# Patient Record
Sex: FEMALE | Race: WHITE | NOT HISPANIC OR LATINO | Employment: FULL TIME | URBAN - METROPOLITAN AREA
[De-identification: names, ages, dates, MRNs, and addresses within clinical notes are randomized per-mention and may not be internally consistent; named-entity substitution may affect disease eponyms.]

---

## 2017-04-17 ENCOUNTER — PATIENT OUTREACH (OUTPATIENT)
Dept: ADMINISTRATIVE | Facility: HOSPITAL | Age: 52
End: 2017-04-17

## 2017-04-17 NOTE — LETTER
April 17, 2017    Funmi Leee  [address]  [place] [address]             [facility]  [address]  [place] [address]  Phone: [phone] Dear Ms. Houston:    [facility] is committed to your overall health.  Currently, we have  Dr. Burks listed as your primary care provider.      If Dr. Burks is no longer your primary care provider, please contact our office at [phone] so that we may update our records accordingly.     You may also be due for the following test and/or procedures:    One-time Hepatitis C Screening lab test(a viral condition that can harm the liver)  Tetanus immunization  colonoscopy    If you have had any of the above done at another facility, please bring the records or information to your appointment so that your record at [facility] will be complete.    Please schedule an appointment with Dr. Burks at your earliest convenience by calling [phone] or going to [email or web address].    We appreciate the opportunity to provide you with excellent medical care.    Sincerely,  Manju Mirza  Clinical Care Coordinator  [facility] [address] [facility] [address]  [place] [address]  Phone: [phone]   Fax: [phone]

## 2017-05-16 ENCOUNTER — OFFICE VISIT (OUTPATIENT)
Dept: PRIMARY CARE CLINIC | Facility: CLINIC | Age: 52
End: 2017-05-16
Payer: COMMERCIAL

## 2017-05-16 VITALS
RESPIRATION RATE: 16 BRPM | WEIGHT: 244.06 LBS | OXYGEN SATURATION: 97 % | TEMPERATURE: 98 F | DIASTOLIC BLOOD PRESSURE: 78 MMHG | BODY MASS INDEX: 36.99 KG/M2 | HEIGHT: 68 IN | HEART RATE: 73 BPM | SYSTOLIC BLOOD PRESSURE: 134 MMHG

## 2017-05-16 DIAGNOSIS — B00.1 RECURRENT COLD SORES: ICD-10-CM

## 2017-05-16 DIAGNOSIS — R59.1 LYMPHADENOPATHY OF HEAD AND NECK: Primary | ICD-10-CM

## 2017-05-16 PROCEDURE — 99999 PR PBB SHADOW E&M-EST. PATIENT-LVL IV: CPT | Mod: PBBFAC,,, | Performed by: NURSE PRACTITIONER

## 2017-05-16 PROCEDURE — 1160F RVW MEDS BY RX/DR IN RCRD: CPT | Mod: S$GLB,,, | Performed by: NURSE PRACTITIONER

## 2017-05-16 PROCEDURE — 99213 OFFICE O/P EST LOW 20 MIN: CPT | Mod: S$GLB,,, | Performed by: NURSE PRACTITIONER

## 2017-05-16 RX ORDER — VALACYCLOVIR HYDROCHLORIDE 1 G/1
TABLET, FILM COATED ORAL
Qty: 30 TABLET | Refills: 1 | Status: SHIPPED | OUTPATIENT
Start: 2017-05-16 | End: 2017-09-21

## 2017-05-16 RX ORDER — AZITHROMYCIN 250 MG/1
TABLET, FILM COATED ORAL
Refills: 0 | COMMUNITY
Start: 2017-03-05 | End: 2017-05-16

## 2017-05-16 RX ORDER — DARIFENACIN 15 MG/1
TABLET, EXTENDED RELEASE ORAL
Refills: 11 | COMMUNITY
Start: 2017-04-29 | End: 2017-09-21

## 2017-05-16 RX ORDER — BENZONATATE 100 MG/1
CAPSULE ORAL
Refills: 0 | COMMUNITY
Start: 2017-03-05 | End: 2017-05-16

## 2017-05-16 RX ORDER — AZITHROMYCIN 250 MG/1
TABLET, FILM COATED ORAL
Qty: 6 TABLET | Refills: 0 | Status: SHIPPED | OUTPATIENT
Start: 2017-05-16 | End: 2017-06-22

## 2017-05-16 NOTE — MR AVS SNAPSHOT
[facility]  [address] [facility] [address]  [facility] [address]  Phone: [phone]                  Funmi Houston   2017 6:30 PM   Office Visit    Description:  Female : 1965   Provider:  Adeola Lamb NP   Department:  [facility]           Reason for Visit     Edema     Sore Throat     Otalgia     Medication Refill           Diagnoses this Visit        Comments    Lymphadenopathy of head and neck    -  Primary     Recurrent cold sores                To Do List           Goals (5 Years of Data)     None       These Medications        Disp Refills Start End    azithromycin (Z-NASIMA) 250 MG tablet 6 tablet 0 2017     Take 2 tablets by mouth on day 1; Take 1 tablet by mouth on days 2-5    [id number] - [facility] [address] Ph #: [phone]       valacyclovir (VALTREX) 1000 MG tablet 30 tablet 1 2017     TAKE 2 TABLETS BY MOUTH TWICE A DAY for one day. As needed for fever blister.    [id number] - [place] [address] Ph #: [phone]         [facility]     [facility] On [facility] Nurse Care Line -  Assistance  Unless otherwise directed by your provider, please contact [facility], our nurse care line that is available for  assistance.     Registered nurses in the [facility] provide: appointment scheduling, clinical advisement, health education, and other advisory services.  Call: [phone] (toll free)               Medications           Message regarding Medications     Verify the changes and/or additions to your medication regime listed below are the same as discussed with your clinician today.  If any of these changes or additions are incorrect, please notify your healthcare provider.        START taking these NEW medications        Refills    azithromycin (Z-NASIMA) 250 MG tablet 0    Sig: Take 2 tablets by mouth on day 1; Take 1 tablet by mouth on days 2-5    Class:  Normal      CHANGE how you are taking these medications     Start Taking Instead of    valacyclovir (VALTREX) 1000 MG tablet valacyclovir (VALTREX) 1000 MG tablet    Dosage:  TAKE 2 TABLETS BY MOUTH TWICE A DAY for one day. As needed for fever blister. Dosage:  TAKE 2 TABLETS BY MOUTH TWICE A DAY for one day    Reason for Change:  Patient no longer taking       STOP taking these medications     benzonatate (TESSALON) 100 MG capsule SWALLOW WHOLE 1 CAPSULE BY MOUTH 3 TIMES A DAY AS NEEDED. DO NOT BREAK, CHEW, DISSOLVE, CUT OR CRUSH    fluticasone (FLONASE) 50 mcg/actuation nasal spray 2 sprays by Each Nare route once daily.    furosemide (LASIX) 20 MG tablet Take 1 tablet (20 mg total) by mouth once daily.    methylPREDNISolone (MEDROL DOSEPACK) 4 mg tablet Take as directed           Verify that the below list of medications is an accurate representation of the medications you are currently taking.  If none reported, the list may be blank. If incorrect, please contact your healthcare provider. Carry this list with you in case of emergency.           Current Medications     darifenacin (ENABLEX) 15 mg 24 hr tablet TAKE 1 TABLET(S) BY MOUTH DAILY    estradiol (ESTRACE) 0.5 MG tablet Take 0.5 mg by mouth once daily.      multivitamin (ONE DAILY MULTIVITAMIN) per tablet Take 1 tablet by mouth once daily.    omeprazole (PRILOSEC) 40 MG capsule TAKE 1 CAPSULE (40 MG TOTAL) BY MOUTH ONCE DAILY.    ranitidine (ZANTAC) 300 MG tablet TAKE 1 TABLET (300 MG TOTAL) BY MOUTH EVERY EVENING. , ABOUT 30-60 MINUTES BEFORE BEDTIME.    azithromycin (Z-NASIMA) 250 MG tablet Take 2 tablets by mouth on day 1; Take 1 tablet by mouth on days 2-5    potassium chloride (KAYCIEL) 10 % solution Take 5 mLs by mouth daily as needed.     valacyclovir (VALTREX) 1000 MG tablet TAKE 2 TABLETS BY MOUTH TWICE A DAY for one day. As needed for fever blister.           Clinical Reference Information           Your Vitals Were     BP Pulse Temp Resp Height  "Weight    134/78 73 98.4 °F (36.9 °C) 16 5' 8" (1.727 m) 110.7 kg (244 lb 0.8 oz)    SpO2 BMI             97% 37.11 kg/m2         Blood Pressure          Most Recent Value    BP  134/78      Allergies as of 5/16/2017     Penicillins      Immunizations Administered on Date of Encounter - 5/16/2017     None      [facility] Sign-Up     Activating your [facility] account is as easy as 1-2-3!     1) Visit [email or web address], select Sign Up Now, enter this activation code and your date of birth, then select Next.  [email or web address]  Expires: 6/30/2017  6:58 PM      2) Create a username and password to use when you visit [facility] in the future and select a security question in case you lose your password and select Next.    3) Enter your e-mail address and click Sign Up!    Additional Information  If you have questions, please e-mail [email or web address] or call [phone] to talk to our [facility] staff. Remember, [facility] is NOT to be used for urgent needs. For medical emergencies, dial 911.         Instructions    The lymph nodes are not indicating and ear infection or throat infection.  Their function is to increase in size with infection or inflammation.  We'll do a round of antibiotics.  If this doesn't improve the symptoms, call    Start the fever blister medicine right away.    If you have any questions, please call.  You can reach us at [phone] Monday through Friday (except holidays) 12 nooon to 6 p.m.    Thank you for using the [facility]!    Marsha Lamb, APRN, CNP, FNP-BC  [facility]  [facility]      Lymphadenopathy  Lymphadenopathy is swelling of the lymph nodes. Lymph nodes are small, bean-shaped glands around the body.  What are lymph nodes?  Lymph nodes are part of your immune system. The glands are found in your neck, armpits, groin, chest, and abdomen. They act as filters for lymph fluid as it flows through your body. Lymph fluid contains white blood cells and other things that " fight infection.  Why lymph nodes swell  Lymphadenopathy is very common. The glands often enlarge during a viral or bacterial infection. It can happen during a cold, the flu, or strep throat. The nodes may swell in just one area of the body, such as the neck (localized). Or nodes may swell all over the body (generalized). The neck (cervical) lymph nodes are the most common site of lymphadenopathy.  What causes lymphadenopathy?  Dead cells and fluid build up in the lymph nodes as they help fight infection or disease. This causes them to swell in size. Enlarged lymph nodes are often near the source of infection. This can help to find the cause of an infection. For example, swollen lymph nodes around the jaw may be because of an infection in the teeth or mouth. But lymphadenopathy may also be generalized. This is common in some viral illnesses such as mononucleosis or chickenpox (varicella).  Lymphadenopathy can also be caused by:  · Infection of a lymph node or small group of nodes (lymphadenitis)  · Cancer  · Reactions to medicines such as antibiotics and some seizure medicines  · Other health conditions, such as lupus  Symptoms of lymphadenopathy  Lymphadenopathy can cause symptoms such as:  · Lumps under the jaw, on the sides or back of the neck, in the armpits, in the groin, or in the chest or belly (abdomen)  · Pain or tenderness in any of these areas  · Redness or warmth in any of these areas  You may also have symptoms from an infection causing the swollen glands. These symptoms may include fever, sore throat, body aches, or cough.  Diagnosing lymphadenopathy  Your health care provider will ask about your health history and symptoms. He or she will give you a physical exam and check the areas where lymph nodes are enlarged. Your health care provider will check the size and location of the nodes, and ask how long they have been swollen and if they are painful. Diagnostic tests and referral to specialists may be  recommended. They may include:  · Blood tests. These are done to check for signs of infection and other problems.  · Urine test. This is also done to check for infection and other problems.  · Chest X-ray. This test can show enlarged lymph nodes or other problems.  · Lymph node biopsy. If lymph nodes are swollen for 3 to 4 weeks, they may be checked with a biopsy. Small samples of lymph node tissue are taken and checked in a lab for signs of cancer. You may be referred to a specialist in blood disorders and cancer (hematologist and oncologist).  Treatment for lymphadenopathy  The treatment of enlarged lymph nodes depends on the cause. Enlarged lymph nodes are often harmless and go away without any treatment. Treatment is most often done on the cause of the enlarged nodes and may include:  · Antibiotic medicine to treat a bacterial infection  · Incision and drainage (I & D) of a lymph node for lymphadenitis  · Other medicines or procedures to treat the cause of the enlarged nodes  You may need follow-up exam in 3 to 4 weeks to recheck enlarged nodes.     When to call your health care provider  Call your health care provider if you have lymph nodes that are still swollen after 3 to 4 weeks.   Date Last Reviewed: 6/19/2015  © 9586-6254 [employer]. [address] [place] [address]. All rights reserved. This information is not intended as a substitute for professional medical care. Always follow your healthcare professional's instructions.             Language Assistance Services     ATTENTION: Language assistance services are available, free of charge. Please call [phone].      ATENCIÓN: Si habla juan mañol, tiene a odonnell disposición servicios gratuitos de asistencia lingüística. Llbridgett al [phone].     [facility] Ý: N?u b?n nói Ti?ng Vi?t, có các d?ch v? h? tr? ngôn ng? mi?n phí dành cho b?n. G?i s? [phone].         [facility] complies with applicable Federal civil rights  laws and does not discriminate on the basis of race, color, national origin, age, disability, or sex.

## 2017-05-16 NOTE — PROGRESS NOTES
"Funmi Houston is a 51 y.o. female patient of Abelardo Burks MD who presents to the clinic today for   Chief Complaint   Patient presents with    Edema     left side of neck glands are swollen, noticed 2 days ago, took advil sinus pill today, is on a daily alegra    Sore Throat    Otalgia     started today    Medication Refill     Valtrex for fever blisters.   .    HPI    Pt, who is not known to me, reports a new problem to me:  Left side of the neck glands swollen, throat "tight" r/t swelling, no ST inside.  No RN, unusual coughing, fever.  Glands swollen x 2 days, ear pain today.  Right eye "a little swollen".      These symptoms began 2 days ago and status is worsening.     Symptoms are + acute    Pt denies the following symptoms:  Fever, RN, abnormal coughing (has a little cough much of time)    Aggravating factors include moving her neck, smiling.    Relieving factors include nothing .    OTC Medications tried are Tylenol, Advil.    Prescription medications taken for symptoms are none.    Pertinent medical history:  No sick contacts.  H/o swollen glands long time ago, occurred at that time with the fever blister.  Feels fever blisters coming now.    Smoking status:  never    ROS    Constitutional:   No  fever, no fatigue, no change in appetite.    Head:   + headache  Ears:   left pain.  Eyes: right eye swollen  Nose:   No sinus pain, no congestion, no runny nose, no post nasal drip.  Throat:  No ST pain.    Heart:  No palpitations, chest pain.    Lungs:  No difficulty breathing, no unusual coughing    GI/:  No sxs    MS:  No new bone, joint or muscle problems.    Skin:  No rashes, itching.      PAST MEDICAL HISTORY:  Past Medical History:   Diagnosis Date    Arthritis of knee     Edema     History of endometriosis     Restless leg syndrome        PAST SURGICAL HISTORY:  Past Surgical History:   Procedure Laterality Date    CHOLECYSTECTOMY      TONSILLECTOMY      TOTAL VAGINAL HYSTERECTOMY   "       SOCIAL HISTORY:  Social History     Social History    Marital status:      Spouse name: N/A    Number of children: N/A    Years of education: N/A     Occupational History    Not on file.     Social History Main Topics    Smoking status: Never Smoker    Smokeless tobacco: Never Used    Alcohol use Yes      Comment: seldom    Drug use: No    Sexual activity: Not on file     Other Topics Concern    Not on file     Social History Narrative        2 children           FAMILY HISTORY:  Family History   Problem Relation Age of Onset    Diabetes Mother     Kidney disease Mother     Stroke Mother     Heart disease Mother     Diabetes Father     Stroke Father     Heart disease Father     Macular degeneration Father     Glaucoma Father     Diabetes Brother     Heart disease Brother     Macular degeneration Brother     Macular degeneration Sister     Macular degeneration Brother     Macular degeneration Other        ALLERGIES AND MEDICATIONS: updated and reviewed.  Review of patient's allergies indicates:   Allergen Reactions    Penicillins Hives and Rash     Current Outpatient Prescriptions   Medication Sig Dispense Refill    darifenacin (ENABLEX) 15 mg 24 hr tablet TAKE 1 TABLET(S) BY MOUTH DAILY  11    estradiol (ESTRACE) 0.5 MG tablet Take 0.5 mg by mouth once daily.        multivitamin (ONE DAILY MULTIVITAMIN) per tablet Take 1 tablet by mouth once daily.      omeprazole (PRILOSEC) 40 MG capsule TAKE 1 CAPSULE (40 MG TOTAL) BY MOUTH ONCE DAILY. 30 capsule 11    ranitidine (ZANTAC) 300 MG tablet TAKE 1 TABLET (300 MG TOTAL) BY MOUTH EVERY EVENING. , ABOUT 30-60 MINUTES BEFORE BEDTIME. 30 tablet 11    potassium chloride (KAYCIEL) 10 % solution Take 5 mLs by mouth daily as needed.        No current facility-administered medications for this visit.              PHYSICAL EXAM    Alert, coop 51 y.o. female patient in mod distress r/t pain.    Vitals:    05/16/17  1832   BP: 134/78   Pulse: 73   Resp: 16   Temp: 98.4 °F (36.9 °C)     VS reviewed.  VS stable.  CC, nursing note, medications & PMH all reviewed today.    Head:  Normocephalic, atraumatic.    EENT:  EACs patent.  TMs no erythema, diffuse LR, no effusions, no TM perforation.                              Eye lids:  Mild edema of the left upper and lower lid, no lesions or discharge present.  Right conjunctiva mildly injected.     Sinus tenderness to palp--none.               Nares--no edema, no d/c present.    Pharynx not injected.                Tonsils not erythematous , not enlarged, no exudate present.    Large, tender left anterior, no posterior cervical lymph nodes palpable.    Large tender left submandibular but no submental or supraclavicular lymph nodes palp.             Resp:  Respirations even, unlabored   Lungs CTA bilat.  No wheezing.  No crackles.  Moves air to bases bilat.    Heart:  RRR, no MRG.    MS:  Ambulates normally.             NEURO:  Alert and oriented x 4.  Responds appropriately during interaction.    Skin:  Warm, dry, color good.  No vesicular lesions noted.    Lymphadenopathy of head and neck  -     azithromycin (Z-NASIMA) 250 MG tablet; Take 2 tablets by mouth on day 1; Take 1 tablet by mouth on days 2-5  Dispense: 6 tablet; Refill: 0    Recurrent cold sores  -     valacyclovir (VALTREX) 1000 MG tablet; TAKE 2 TABLETS BY MOUTH TWICE A DAY for one day. As needed for fever blister.  Dispense: 30 tablet; Refill: 1      Pt today presents with 2 days of worsening lymphadenopathy w/o ST or sinus sxs, no fever.  C/o feeling the itching and prodrome of fever blisters starting on her upper lip but no lesions present as yet.  Requests RF of Valtrex.    This is a new problem to me.  No work up is planned.        Trial of antibiotic for the lymphadenopathy.  If it does not respond, pt to call.  At that time CBC would be ordered.  RF of Valtrex to start right away for the impending fever blisters.  Pt  advised to perform comfort measures recommended on patient instruction sheet .    If not better in 3 days, the patient is advised to call us.  She is advised that the glands may take even as long as 2 weeks resolve her sxs.  If worse or concerns, the patient is advised to call us.  Explained exam findings, diagnosis and treatment plan to patient.  Questions answered and patient states understanding.

## 2017-05-16 NOTE — PATIENT INSTRUCTIONS
The lymph nodes are not indicating and ear infection or throat infection.  Their function is to increase in size with infection or inflammation.  We'll do a round of antibiotics.  If this doesn't improve the symptoms, call    Start the fever blister medicine right away.    If you have any questions, please call.  You can reach us at [phone] Monday through Friday (except holidays) 12 nooon to 6 p.m.    Thank you for using the [facility]!    Marsha Lamb, APRN, CNP, FNP-BC  [facility]  [facility]      Lymphadenopathy  Lymphadenopathy is swelling of the lymph nodes. Lymph nodes are small, bean-shaped glands around the body.  What are lymph nodes?  Lymph nodes are part of your immune system. The glands are found in your neck, armpits, groin, chest, and abdomen. They act as filters for lymph fluid as it flows through your body. Lymph fluid contains white blood cells and other things that fight infection.  Why lymph nodes swell  Lymphadenopathy is very common. The glands often enlarge during a viral or bacterial infection. It can happen during a cold, the flu, or strep throat. The nodes may swell in just one area of the body, such as the neck (localized). Or nodes may swell all over the body (generalized). The neck (cervical) lymph nodes are the most common site of lymphadenopathy.  What causes lymphadenopathy?  Dead cells and fluid build up in the lymph nodes as they help fight infection or disease. This causes them to swell in size. Enlarged lymph nodes are often near the source of infection. This can help to find the cause of an infection. For example, swollen lymph nodes around the jaw may be because of an infection in the teeth or mouth. But lymphadenopathy may also be generalized. This is common in some viral illnesses such as mononucleosis or chickenpox (varicella).  Lymphadenopathy can also be caused by:  · Infection of a lymph node or small group of nodes  (lymphadenitis)  · Cancer  · Reactions to medicines such as antibiotics and some seizure medicines  · Other health conditions, such as lupus  Symptoms of lymphadenopathy  Lymphadenopathy can cause symptoms such as:  · Lumps under the jaw, on the sides or back of the neck, in the armpits, in the groin, or in the chest or belly (abdomen)  · Pain or tenderness in any of these areas  · Redness or warmth in any of these areas  You may also have symptoms from an infection causing the swollen glands. These symptoms may include fever, sore throat, body aches, or cough.  Diagnosing lymphadenopathy  Your health care provider will ask about your health history and symptoms. He or she will give you a physical exam and check the areas where lymph nodes are enlarged. Your health care provider will check the size and location of the nodes, and ask how long they have been swollen and if they are painful. Diagnostic tests and referral to specialists may be recommended. They may include:  · Blood tests. These are done to check for signs of infection and other problems.  · Urine test. This is also done to check for infection and other problems.  · Chest X-ray. This test can show enlarged lymph nodes or other problems.  · Lymph node biopsy. If lymph nodes are swollen for 3 to 4 weeks, they may be checked with a biopsy. Small samples of lymph node tissue are taken and checked in a lab for signs of cancer. You may be referred to a specialist in blood disorders and cancer (hematologist and oncologist).  Treatment for lymphadenopathy  The treatment of enlarged lymph nodes depends on the cause. Enlarged lymph nodes are often harmless and go away without any treatment. Treatment is most often done on the cause of the enlarged nodes and may include:  · Antibiotic medicine to treat a bacterial infection  · Incision and drainage (I & D) of a lymph node for lymphadenitis  · Other medicines or procedures to treat the cause of the enlarged  nodes  You may need follow-up exam in 3 to 4 weeks to recheck enlarged nodes.     When to call your health care provider  Call your health care provider if you have lymph nodes that are still swollen after 3 to 4 weeks.   Date Last Reviewed: 6/19/2015  © 5310-9657 [employer]. [address] [place] [address], [place] [address]. All rights reserved. This information is not intended as a substitute for professional medical care. Always follow your healthcare professional's instructions.

## 2017-05-16 NOTE — Clinical Note
Abelardo Burks MD,  I saw your patient today in the [facility].  If you have any questions, please do not hesitate to contact me.  Thank you!  KIKI Varner

## 2017-05-25 ENCOUNTER — TELEPHONE (OUTPATIENT)
Dept: UROLOGY | Facility: CLINIC | Age: 52
End: 2017-05-25

## 2017-05-25 NOTE — TELEPHONE ENCOUNTER
----- Message from Laurie Lemon sent at 5/25/2017  4:15 PM CDT -----  Contact: Patient  Patient is returning your call. Please call patient at [phone].

## 2017-06-13 RX ORDER — OMEPRAZOLE 40 MG/1
CAPSULE, DELAYED RELEASE ORAL
Qty: 30 CAPSULE | Refills: 11 | Status: SHIPPED | OUTPATIENT
Start: 2017-06-13 | End: 2018-06-14 | Stop reason: SDUPTHER

## 2017-06-22 ENCOUNTER — OFFICE VISIT (OUTPATIENT)
Dept: UROLOGY | Facility: CLINIC | Age: 52
End: 2017-06-22
Payer: COMMERCIAL

## 2017-06-22 VITALS
DIASTOLIC BLOOD PRESSURE: 72 MMHG | HEART RATE: 69 BPM | SYSTOLIC BLOOD PRESSURE: 139 MMHG | WEIGHT: 242.31 LBS | BODY MASS INDEX: 36.72 KG/M2 | HEIGHT: 68 IN

## 2017-06-22 DIAGNOSIS — R35.1 NOCTURIA MORE THAN TWICE PER NIGHT: ICD-10-CM

## 2017-06-22 DIAGNOSIS — R35.0 INCREASED URINARY FREQUENCY: ICD-10-CM

## 2017-06-22 DIAGNOSIS — N39.41 URGE INCONTINENCE: ICD-10-CM

## 2017-06-22 DIAGNOSIS — N39.41 URGENCY INCONTINENCE: Primary | ICD-10-CM

## 2017-06-22 LAB
BILIRUB SERPL-MCNC: NORMAL MG/DL
BLOOD URINE, POC: NORMAL
COLOR, POC UA: YELLOW
GLUCOSE UR QL STRIP: NORMAL
KETONES UR QL STRIP: NORMAL
LEUKOCYTE ESTERASE URINE, POC: NORMAL
NITRITE, POC UA: NORMAL
PH, POC UA: 7
PROTEIN, POC: NORMAL
SPECIFIC GRAVITY, POC UA: 1
UROBILINOGEN, POC UA: NORMAL

## 2017-06-22 PROCEDURE — 99999 PR PBB SHADOW E&M-EST. PATIENT-LVL III: CPT | Mod: PBBFAC,,, | Performed by: UROLOGY

## 2017-06-22 PROCEDURE — 81002 URINALYSIS NONAUTO W/O SCOPE: CPT | Mod: S$GLB,,, | Performed by: UROLOGY

## 2017-06-22 PROCEDURE — 99204 OFFICE O/P NEW MOD 45 MIN: CPT | Mod: 25,S$GLB,, | Performed by: UROLOGY

## 2017-06-22 NOTE — PROGRESS NOTES
Subjective:       Patient ID: Funmi Houston is a 51 y.o. female.    Chief Complaint: Urinary Urgency; Nocturia; Urinary Frequency; and Back Pain    HPI     51 year old with severe urgency and urgency incontinence for years.  She soaks through several pads per day.  She has  Been taking Enablex for the last 6-7 years.  She also complains of nocturia x3-4.  She denies hematuia and dysuria.  She gets rare urinary tract infections.  The Enablex has been effective until the last few years.  She also complains of intermittent constipation.  She drinks little caffeine.   Urine dipstick shows negative for all components.  post void residual 29 ml    Past Medical History:   Diagnosis Date    Arthritis of knee     Edema     History of endometriosis     Restless leg syndrome      Past Surgical History:   Procedure Laterality Date    CHOLECYSTECTOMY      TONSILLECTOMY      TOTAL VAGINAL HYSTERECTOMY         Current Outpatient Prescriptions:     darifenacin (ENABLEX) 15 mg 24 hr tablet, TAKE 1 TABLET(S) BY MOUTH DAILY, Disp: , Rfl: 11    estradiol (ESTRACE) 0.5 MG tablet, Take 0.5 mg by mouth once daily.  , Disp: , Rfl:     multivitamin (ONE DAILY MULTIVITAMIN) per tablet, Take 1 tablet by mouth once daily., Disp: , Rfl:     omeprazole (PRILOSEC) 40 MG capsule, TAKE 1 CAPSULE (40 MG TOTAL) BY MOUTH ONCE DAILY., Disp: 30 capsule, Rfl: 11    ranitidine (ZANTAC) 300 MG tablet, TAKE 1 TABLET (300 MG TOTAL) BY MOUTH EVERY EVENING. , ABOUT 30-60 MINUTES BEFORE BEDTIME., Disp: 30 tablet, Rfl: 11    valacyclovir (VALTREX) 1000 MG tablet, TAKE 2 TABLETS BY MOUTH TWICE A DAY for one day. As needed for fever blister., Disp: 30 tablet, Rfl: 1    mirabegron (MYRBETRIQ) 50 mg Tb24, Take 1 tablet (50 mg total) by mouth once daily., Disp: 30 tablet, Rfl: 11    Review of Systems   Constitutional: Negative for chills and fever.   Eyes: Negative for visual disturbance.   Respiratory: Negative for shortness of breath.     Cardiovascular: Negative for chest pain.   Gastrointestinal: Negative for abdominal pain and nausea.   Genitourinary: Negative for dysuria, flank pain and hematuria.   Musculoskeletal: Negative for gait problem.   Skin: Negative for rash.   Neurological: Negative for seizures.   Psychiatric/Behavioral: Negative for confusion.       Objective:      Physical Exam   Constitutional: She is oriented to person, place, and time. She appears well-developed and well-nourished.   HENT:   Head: Normocephalic.   Eyes: Conjunctivae and EOM are normal.   Neck: Normal range of motion.   Cardiovascular: Normal rate.    Pulmonary/Chest: Effort normal.   Abdominal: Soft. She exhibits no distension and no mass. There is no tenderness.   Genitourinary:   Genitourinary Comments: Bladder non-tender and nondistended  No CVA tenderness   Musculoskeletal: She exhibits no edema.   Neurological: She is alert and oriented to person, place, and time.   Skin: Skin is warm and dry. No rash noted. No erythema.   Psychiatric: She has a normal mood and affect. Her behavior is normal.   Vitals reviewed.      Assessment:       1. Urgency incontinence    2. Increased urinary frequency    3. Urge incontinence    4. Nocturia more than twice per night        Plan:       Urgency incontinence  -     POCT URINE DIPSTICK WITHOUT MICROSCOPE    Increased urinary frequency    Urge incontinence    Nocturia more than twice per night    Other orders  -     mirabegron (MYRBETRIQ) 50 mg Tb24; Take 1 tablet (50 mg total) by mouth once daily.  Dispense: 30 tablet; Refill: 11      Rec trial Myrbetriq.  F/u 3 months.

## 2017-07-27 ENCOUNTER — PATIENT OUTREACH (OUTPATIENT)
Dept: ADMINISTRATIVE | Facility: HOSPITAL | Age: 52
End: 2017-07-27

## 2017-08-04 ENCOUNTER — PATIENT OUTREACH (OUTPATIENT)
Dept: ADMINISTRATIVE | Facility: HOSPITAL | Age: 52
End: 2017-08-04

## 2017-08-04 NOTE — PROGRESS NOTES
Portal outreach un-read by patient.  Outreach mailed today    [facility] is committed to your overall health.  To help you get the most out of each of your visits, we will review your information to make sure you are up to date on all of your recommended tests and/or procedures.       Dr. Burks       has found that you may be due for:     One-time Hepatitis C Screening lab test(a viral condition that can harm the liver)   Tetanus immunization   Mammogram       If you have had any of the above done at another facility, please bring the records or information with you so that your record at [facility] will be complete.      If you are currently taking medication , please bring it with you to your appointment for review.     We appreciate the opportunity to provide you with excellent medical care.

## 2017-08-04 NOTE — LETTER
August 4, 2017    Funmi Houston  [address] [facility] [address]             [facility]  [address]  [place] [address]  Phone: [phone] Dear Ms. Houston:    We have tried to reach you by portal.     [facility] is committed to your overall health.  To help you get the most out of each of your visits, we will review your information to make sure you are up to date on all of your recommended tests and/or procedures.       Dr. Burks       has found that you may be due for:     One-time Hepatitis C Screening lab test(a viral condition that can harm the liver)   Tetanus immunization   Mammogram     If you have had any of the above done at another facility, please bring the records or information with you so that your record at [facility] will be complete.      If you are currently taking medication , please bring it with you to your appointment for review.     We appreciate the opportunity to provide you with excellent medical care.     Sincerely,    ,Manju Mirza  Clinical Care Coordinator  [facility] [address] [facility] [address]  [place] [address]  Phone: [phone]   Fax: [phone]

## 2017-08-11 ENCOUNTER — OFFICE VISIT (OUTPATIENT)
Dept: FAMILY MEDICINE | Facility: CLINIC | Age: 52
End: 2017-08-11
Payer: COMMERCIAL

## 2017-08-11 VITALS
SYSTOLIC BLOOD PRESSURE: 124 MMHG | HEIGHT: 68 IN | DIASTOLIC BLOOD PRESSURE: 88 MMHG | HEART RATE: 68 BPM | BODY MASS INDEX: 37.02 KG/M2 | WEIGHT: 244.25 LBS | OXYGEN SATURATION: 98 %

## 2017-08-11 DIAGNOSIS — Z00.00 ROUTINE MEDICAL EXAM: Primary | ICD-10-CM

## 2017-08-11 DIAGNOSIS — M54.12 LEFT CERVICAL RADICULOPATHY: ICD-10-CM

## 2017-08-11 DIAGNOSIS — Z11.59 NEED FOR HEPATITIS C SCREENING TEST: ICD-10-CM

## 2017-08-11 DIAGNOSIS — K21.9 GASTROESOPHAGEAL REFLUX DISEASE, ESOPHAGITIS PRESENCE NOT SPECIFIED: ICD-10-CM

## 2017-08-11 PROCEDURE — 99999 PR PBB SHADOW E&M-EST. PATIENT-LVL III: CPT | Mod: PBBFAC,,, | Performed by: FAMILY MEDICINE

## 2017-08-11 PROCEDURE — 99396 PREV VISIT EST AGE 40-64: CPT | Mod: S$GLB,,, | Performed by: FAMILY MEDICINE

## 2017-08-11 RX ORDER — PREDNISONE 10 MG/1
TABLET ORAL
Qty: 20 TABLET | Refills: 0 | Status: SHIPPED | OUTPATIENT
Start: 2017-08-11 | End: 2017-09-21

## 2017-08-11 RX ORDER — DICLOFENAC SODIUM 75 MG/1
75 TABLET, DELAYED RELEASE ORAL 2 TIMES DAILY
Qty: 60 TABLET | Refills: 2 | Status: SHIPPED | OUTPATIENT
Start: 2017-08-11 | End: 2018-02-22 | Stop reason: SDUPTHER

## 2017-08-11 NOTE — PROGRESS NOTES
Subjective:       Patient ID: Funmi Houston is a 51 y.o. female.    Chief Complaint: Annual Exam and Arm Pain (shoulder to LEFT LOWER )    HPI     Here for f/u.     C/o left sided aching neck pain radiating to left hand x 2 weeks. Ps: 5-6/10.  otc advil provides no relief.     Sees Dr. Lugo, gynecologist, for pap and mmg. Utd.     gerd stable. Takes nexium and prilosec.     Review of Systems      Review of Systems   Constitutional: Negative for fever and chills.   HENT: Negative for hearing loss and neck stiffness.    Eyes: Negative for redness and itching.   Respiratory: Negative for cough and choking.    Cardiovascular: Negative for chest pain and leg swelling.  Abdomen: Negative for abdominal pain and blood in stool.   Genitourinary: Negative for dysuria and flank pain.   Musculoskeletal: Negative for back pain and gait problem.   Neurological: Negative for light-headedness and headaches.   Hematological: Negative for adenopathy.   Psychiatric/Behavioral: Negative for behavioral problems.       Objective:      Physical Exam   Constitutional: She is oriented to person, place, and time. She appears well-developed. No distress.   HENT:   Head: Normocephalic.   Mouth/Throat: Oropharynx is clear and moist.   Eyes: Pupils are equal, round, and reactive to light.   Neck: Normal range of motion. Neck supple. No thyromegaly present.   Cardiovascular: Normal rate, regular rhythm and normal heart sounds.  Exam reveals no friction rub.    No murmur heard.  Pulmonary/Chest: Breath sounds normal. She has no wheezes. She has no rales.   Abdominal: Soft. Bowel sounds are normal. She exhibits no distension and no mass. There is no tenderness.   Musculoskeletal:   Cervical spine: tend of left paravert area.  Dec rom with flex/ext. Positive spurling to left     Neurological: She is alert and oriented to person, place, and time. She has normal reflexes. No cranial nerve deficit.   Skin: Skin is warm. No rash noted. No erythema.    Psychiatric: She has a normal mood and affect. Thought content normal.       Assessment:       1. Routine medical exam    2. Need for hepatitis C screening test    3. Left cervical radiculopathy    4. Gastroesophageal reflux disease, esophagitis presence not specified        Plan:       Routine medical exam  -     Comprehensive metabolic panel; Future; Expected date: 08/11/2017  -     Lipid panel; Future; Expected date: 08/11/2017  -     TSH; Future; Expected date: 08/11/2017    Need for hepatitis C screening test  -     Hepatitis C antibody; Future; Expected date: 08/11/2017    Left cervical radiculopathy  -     Ambulatory Referral to Physical/Occupational Therapy    Gastroesophageal reflux disease, esophagitis presence not specified    Other orders  -     diclofenac (VOLTAREN) 75 MG EC tablet; Take 1 tablet (75 mg total) by mouth 2 (two) times daily.  Dispense: 60 tablet; Refill: 2  -     predniSONE (DELTASONE) 10 MG tablet; Take 4 tabs daily for 2 days then Take 3 tabs daily for 2 days thenTake 2 tabs daily for 2 days then Take 1 tab daily for 2 days then stop  Dispense: 20 tablet; Refill: 0      Plan:  See orders  rf voltaren and prednisone taper. Refer to PT  Cont all other meds

## 2017-08-15 ENCOUNTER — LAB VISIT (OUTPATIENT)
Dept: LAB | Facility: HOSPITAL | Age: 52
End: 2017-08-15
Attending: FAMILY MEDICINE
Payer: COMMERCIAL

## 2017-08-15 DIAGNOSIS — Z00.00 ROUTINE MEDICAL EXAM: ICD-10-CM

## 2017-08-15 DIAGNOSIS — Z11.59 NEED FOR HEPATITIS C SCREENING TEST: ICD-10-CM

## 2017-08-15 LAB
ALBUMIN SERPL BCP-MCNC: 3.8 G/DL
ALP SERPL-CCNC: 103 U/L
ALT SERPL W/O P-5'-P-CCNC: 26 U/L
ANION GAP SERPL CALC-SCNC: 9 MMOL/L
AST SERPL-CCNC: 21 U/L
BILIRUB SERPL-MCNC: 0.7 MG/DL
BUN SERPL-MCNC: 9 MG/DL
CALCIUM SERPL-MCNC: 9.2 MG/DL
CHLORIDE SERPL-SCNC: 109 MMOL/L
CHOLEST/HDLC SERPL: 3.4 {RATIO}
CO2 SERPL-SCNC: 24 MMOL/L
CREAT SERPL-MCNC: 0.8 MG/DL
EST. GFR  (AFRICAN AMERICAN): >60 ML/MIN/1.73 M^2
EST. GFR  (NON AFRICAN AMERICAN): >60 ML/MIN/1.73 M^2
GLUCOSE SERPL-MCNC: 109 MG/DL
HCV AB SERPL QL IA: NEGATIVE
HCV AB SERPL QL IA: NEGATIVE
HDL/CHOLESTEROL RATIO: 29.6 %
HDLC SERPL-MCNC: 196 MG/DL
HDLC SERPL-MCNC: 58 MG/DL
LDLC SERPL CALC-MCNC: 120.2 MG/DL
NONHDLC SERPL-MCNC: 138 MG/DL
POTASSIUM SERPL-SCNC: 4.4 MMOL/L
PROT SERPL-MCNC: 7.6 G/DL
SODIUM SERPL-SCNC: 142 MMOL/L
T4 FREE SERPL-MCNC: 1.05 NG/DL
TRIGL SERPL-MCNC: 89 MG/DL
TSH SERPL DL<=0.005 MIU/L-ACNC: 0.21 UIU/ML

## 2017-08-15 PROCEDURE — 80053 COMPREHEN METABOLIC PANEL: CPT

## 2017-08-15 PROCEDURE — 84443 ASSAY THYROID STIM HORMONE: CPT

## 2017-08-15 PROCEDURE — 80061 LIPID PANEL: CPT

## 2017-08-15 PROCEDURE — 84439 ASSAY OF FREE THYROXINE: CPT

## 2017-08-15 PROCEDURE — 36415 COLL VENOUS BLD VENIPUNCTURE: CPT | Mod: PO

## 2017-08-15 PROCEDURE — 86803 HEPATITIS C AB TEST: CPT

## 2017-08-22 ENCOUNTER — TELEPHONE (OUTPATIENT)
Dept: FAMILY MEDICINE | Facility: CLINIC | Age: 52
End: 2017-08-22

## 2017-08-22 DIAGNOSIS — R79.89 LOW TSH LEVEL: Primary | ICD-10-CM

## 2017-08-22 NOTE — TELEPHONE ENCOUNTER
inform pt via phone that I reviewed the test results and note the following:    Pertinent results:    Your tsh, thyroid stimulating hormone, is low. This is an inverse relationship to your thyroid  which imay be overactive.     Needs to come in 2 weeks for repeat testing.    Please schedule test/tests

## 2017-08-23 ENCOUNTER — PATIENT MESSAGE (OUTPATIENT)
Dept: FAMILY MEDICINE | Facility: CLINIC | Age: 52
End: 2017-08-23

## 2017-08-24 ENCOUNTER — TELEPHONE (OUTPATIENT)
Dept: FAMILY MEDICINE | Facility: CLINIC | Age: 52
End: 2017-08-24

## 2017-08-24 NOTE — TELEPHONE ENCOUNTER
----- Message from Marycarmen Tang sent at 8/23/2017 10:24 AM CDT -----  Contact: patient  Patient returning a missed call. Please advise. Call to pod. No answer.  Call back   Thanks!

## 2017-09-08 ENCOUNTER — LAB VISIT (OUTPATIENT)
Dept: LAB | Facility: HOSPITAL | Age: 52
End: 2017-09-08
Attending: FAMILY MEDICINE
Payer: COMMERCIAL

## 2017-09-08 ENCOUNTER — TELEPHONE (OUTPATIENT)
Dept: FAMILY MEDICINE | Facility: CLINIC | Age: 52
End: 2017-09-08

## 2017-09-08 DIAGNOSIS — R79.89 LOW TSH LEVEL: ICD-10-CM

## 2017-09-08 LAB
THYROPEROXIDASE IGG SERPL-ACNC: <6 IU/ML
TSH SERPL DL<=0.005 MIU/L-ACNC: 0.51 UIU/ML

## 2017-09-08 PROCEDURE — 86376 MICROSOMAL ANTIBODY EACH: CPT

## 2017-09-08 PROCEDURE — 84443 ASSAY THYROID STIM HORMONE: CPT

## 2017-09-08 PROCEDURE — 36415 COLL VENOUS BLD VENIPUNCTURE: CPT | Mod: PO

## 2017-09-08 PROCEDURE — 84445 ASSAY OF TSI GLOBULIN: CPT

## 2017-09-08 NOTE — TELEPHONE ENCOUNTER
----- Message from Nancy Meek sent at 9/8/2017  9:47 AM CDT -----  Contact: Mrs. Choudhary  Good Morning,    Mrs Choudhary received a phone regarding the physical therapy referral Dr. Burks gave her. She states at the time she was called to set up the appointment she could not speak to the person who called her and asked that they call her back. She has yet to receive a call back. She is not sure who called her, can you please call her to help her with this.

## 2017-09-11 LAB — TSI SER-ACNC: <0.1 IU/L

## 2017-09-17 ENCOUNTER — TELEPHONE (OUTPATIENT)
Dept: FAMILY MEDICINE | Facility: CLINIC | Age: 52
End: 2017-09-17

## 2017-09-17 NOTE — TELEPHONE ENCOUNTER
inform pt via phone that I reviewed the test results and note the following:    Thyroid level back to normal.    Thyroid antibodies were negative.

## 2017-09-21 ENCOUNTER — OFFICE VISIT (OUTPATIENT)
Dept: UROLOGY | Facility: CLINIC | Age: 52
End: 2017-09-21
Payer: COMMERCIAL

## 2017-09-21 VITALS — WEIGHT: 238.13 LBS | BODY MASS INDEX: 36.09 KG/M2 | HEIGHT: 68 IN

## 2017-09-21 DIAGNOSIS — R32 URINARY INCONTINENCE, UNSPECIFIED TYPE: ICD-10-CM

## 2017-09-21 DIAGNOSIS — N32.81 OVERACTIVE BLADDER: Primary | ICD-10-CM

## 2017-09-21 LAB
BILIRUB SERPL-MCNC: NORMAL MG/DL
BLOOD URINE, POC: NORMAL
COLOR, POC UA: YELLOW
GLUCOSE UR QL STRIP: NORMAL
KETONES UR QL STRIP: NORMAL
LEUKOCYTE ESTERASE URINE, POC: NORMAL
NITRITE, POC UA: NORMAL
PH, POC UA: 5
PROTEIN, POC: NORMAL
SPECIFIC GRAVITY, POC UA: 1.01
UROBILINOGEN, POC UA: NORMAL

## 2017-09-21 PROCEDURE — 99213 OFFICE O/P EST LOW 20 MIN: CPT | Mod: 25,S$GLB,, | Performed by: UROLOGY

## 2017-09-21 PROCEDURE — 99999 PR PBB SHADOW E&M-EST. PATIENT-LVL II: CPT | Mod: PBBFAC,,, | Performed by: UROLOGY

## 2017-09-21 PROCEDURE — 3008F BODY MASS INDEX DOCD: CPT | Mod: S$GLB,,, | Performed by: UROLOGY

## 2017-09-21 PROCEDURE — 81002 URINALYSIS NONAUTO W/O SCOPE: CPT | Mod: S$GLB,,, | Performed by: UROLOGY

## 2017-09-21 RX ORDER — ESOMEPRAZOLE MAGNESIUM 10 MG/1
10 GRANULE, FOR SUSPENSION, EXTENDED RELEASE ORAL
COMMUNITY
End: 2019-07-31

## 2017-09-21 NOTE — PROGRESS NOTES
Subjective:       Patient ID: Funmi Houston is a 52 y.o. female.    Chief Complaint: Follow-up    HPI     51 year old with severe urgency and urgency incontinence for years.  She soaks through several pads per day.  She has  Been taking Enablex for the last 6-7 years.  She has nocturia x3-4.  She denies hematuia and dysuria.  She was given a trial of Myrbetriq for the last 3 months.  She feels there has been a significant improvement in her symptoms but she still has episode of urgency and urge incontinence.  She was seen by her Gyn recently and there was some concern about prolapse.     Urine dipstick shows negative for all components.    Review of Systems   Constitutional: Negative for fever.   Genitourinary: Negative for dysuria and hematuria.       Objective:      Physical Exam   Constitutional: She is oriented to person, place, and time. She appears well-developed and well-nourished.   Pulmonary/Chest: Effort normal. No respiratory distress.   Neurological: She is alert and oriented to person, place, and time.   Skin: Skin is warm.   Psychiatric: She has a normal mood and affect. Her behavior is normal.   Vitals reviewed.      Assessment:       1. Overactive bladder    2. Urinary incontinence, unspecified type        Plan:       Overactive bladder  -     POCT URINE DIPSTICK WITHOUT MICROSCOPE  -     Cystoscopy; Future    Urinary incontinence, unspecified type  -     Cystoscopy; Future      Continue Myrbetriq.  RTC for cystoscopy.

## 2017-09-28 ENCOUNTER — PATIENT MESSAGE (OUTPATIENT)
Dept: UROLOGY | Facility: CLINIC | Age: 52
End: 2017-09-28

## 2017-11-07 ENCOUNTER — TELEPHONE (OUTPATIENT)
Dept: UROLOGY | Facility: CLINIC | Age: 52
End: 2017-11-07

## 2017-11-07 NOTE — TELEPHONE ENCOUNTER
----- Message from Tracie Wright sent at 11/7/2017 12:02 PM CST -----  Contact: Patient  Funmi, patient [phone]. Calling to cancel the procedure on 11/9/17. And reschedule after the first of the year. Thanks.

## 2017-12-19 ENCOUNTER — OFFICE VISIT (OUTPATIENT)
Dept: URGENT CARE | Facility: CLINIC | Age: 52
End: 2017-12-19
Payer: COMMERCIAL

## 2017-12-19 VITALS
TEMPERATURE: 98 F | OXYGEN SATURATION: 100 % | WEIGHT: 238 LBS | HEIGHT: 68 IN | HEART RATE: 84 BPM | BODY MASS INDEX: 36.07 KG/M2 | DIASTOLIC BLOOD PRESSURE: 84 MMHG | RESPIRATION RATE: 18 BRPM | SYSTOLIC BLOOD PRESSURE: 139 MMHG

## 2017-12-19 DIAGNOSIS — R68.89 FLU-LIKE SYMPTOMS: Primary | ICD-10-CM

## 2017-12-19 LAB
CTP QC/QA: YES
FLUAV AG NPH QL: NEGATIVE
FLUBV AG NPH QL: NEGATIVE

## 2017-12-19 PROCEDURE — 87804 INFLUENZA ASSAY W/OPTIC: CPT | Mod: 59,QW,S$GLB, | Performed by: FAMILY MEDICINE

## 2017-12-19 PROCEDURE — 99214 OFFICE O/P EST MOD 30 MIN: CPT | Mod: 25,S$GLB,, | Performed by: FAMILY MEDICINE

## 2017-12-19 RX ORDER — METHYLPREDNISOLONE 4 MG/1
4 TABLET ORAL DAILY
Qty: 1 PACKAGE | Refills: 0 | Status: SHIPPED | OUTPATIENT
Start: 2017-12-19 | End: 2018-12-19

## 2017-12-20 ENCOUNTER — PATIENT OUTREACH (OUTPATIENT)
Dept: ADMINISTRATIVE | Facility: HOSPITAL | Age: 52
End: 2017-12-20

## 2017-12-20 NOTE — PROGRESS NOTES
"Subjective:       Patient ID: Funmi Houston is a 52 y.o. female.    Vitals:  height is 5' 8" (1.727 m) and weight is 108 kg (238 lb). Her temperature is 98.3 °F (36.8 °C). Her blood pressure is 139/84 and her pulse is 84. Her respiration is 18 and oxygen saturation is 100%.     Chief Complaint: Sinus Problem    Sinus Problem   This is a new problem. The current episode started in the past 7 days. The problem is unchanged. There has been no fever. Associated symptoms include chills, congestion, coughing, sinus pressure and sneezing. Pertinent negatives include no ear pain, headaches, hoarse voice, shortness of breath or sore throat. Past treatments include oral decongestants. The treatment provided mild relief.     Review of Systems   Constitution: Positive for chills. Negative for fever and malaise/fatigue.   HENT: Positive for congestion, sinus pressure and sneezing. Negative for ear pain, hoarse voice and sore throat.    Eyes: Negative for discharge and redness.   Cardiovascular: Negative for chest pain, dyspnea on exertion and leg swelling.   Respiratory: Positive for cough and sputum production. Negative for shortness of breath and wheezing.    Musculoskeletal: Negative for myalgias.   Gastrointestinal: Negative for abdominal pain and nausea.   Neurological: Negative for headaches.       Objective:      Physical Exam   Constitutional: She is oriented to person, place, and time. She appears well-developed and well-nourished. She is cooperative.  Non-toxic appearance. She does not appear ill. No distress.   HENT:   Head: Normocephalic and atraumatic.   Right Ear: Hearing, external ear and ear canal normal. A middle ear effusion is present.   Left Ear: Hearing, external ear and ear canal normal. A middle ear effusion is present.   Nose: Mucosal edema and rhinorrhea present. No nasal deformity. No epistaxis. Right sinus exhibits no maxillary sinus tenderness and no frontal sinus tenderness. Left sinus exhibits no " maxillary sinus tenderness and no frontal sinus tenderness.   Mouth/Throat: Uvula is midline, oropharynx is clear and moist and mucous membranes are normal. No trismus in the jaw. Normal dentition. No uvula swelling. No posterior oropharyngeal erythema.   Eyes: Conjunctivae and lids are normal. No scleral icterus.   Sclera clear bilat   Neck: Trachea normal, full passive range of motion without pain and phonation normal. Neck supple.   Cardiovascular: Normal rate, regular rhythm, normal heart sounds, intact distal pulses and normal pulses.    Pulmonary/Chest: Effort normal and breath sounds normal. No respiratory distress.   Abdominal: Normal appearance. She exhibits no distension. There is no tenderness.   Musculoskeletal: Normal range of motion. She exhibits no edema or deformity.   Neurological: She is alert and oriented to person, place, and time. She exhibits normal muscle tone. Coordination normal.   Skin: Skin is warm, dry and intact. She is not diaphoretic. No pallor.   Psychiatric: She has a normal mood and affect. Her speech is normal and behavior is normal. Judgment and thought content normal. Cognition and memory are normal.   Nursing note and vitals reviewed.      Assessment:       1. Flu-like symptoms        Plan:         Flu-like symptoms  -     POCT Influenza A/B    Other orders  -     methylPREDNISolone (MEDROL DOSEPACK) 4 mg tablet; Take 1 tablet (4 mg total) by mouth once daily. use as directed  Dispense: 1 Package; Refill: 0

## 2017-12-20 NOTE — PROGRESS NOTES
Health Maintenance Due   Topic Date Due    TETANUS VACCINE  08/23/1982    Influenza Vaccine  08/01/2017

## 2018-01-23 ENCOUNTER — PATIENT OUTREACH (OUTPATIENT)
Dept: ADMINISTRATIVE | Facility: HOSPITAL | Age: 53
End: 2018-01-23

## 2018-01-23 NOTE — PROGRESS NOTES
Portal outreach un-read by patient.  Outreach mailed today    Health Maintenance Due   Topic Date Due    TETANUS VACCINE  08/23/1982    Colonoscopy  08/23/2014    Influenza Vaccine  08/01/2017

## 2018-01-23 NOTE — LETTER
January 23, 2018    Funmi Houston  [address] [facility] [address]             [facility]  [address]  [facility] [address]  Phone: [phone] Dear Ms. Houston:    We have tried to reach you by [facility] email unsuccessfully.      [facility] is committed to your overall health and would like to ensure that you are up to date on your recommended health testing.   Dr. Burks has found that you may be due for the following:     Tetanus immunization   Influenza vaccine   Colonoscopy (Colorectal screening)     If you have had any of the above done at another facility, please let us know by calling or faxing to the numbers below so that your medical record can be updated. If you have a copy of these records, please fax them to the fax number below.  If not, please call [phone] so that we can get the necessary information to obtain copies from that facility.     Otherwise, please schedule these appointments at your earliest convenience by calling [phone] or going to [email or web address].      Sincerely,    Manju Mirza  Clinical Care Coordinator  [facility] [address] [place] [address]  Phone: [phone]   Fax: [phone]

## 2018-02-23 RX ORDER — DICLOFENAC SODIUM 75 MG/1
75 TABLET, DELAYED RELEASE ORAL 2 TIMES DAILY
Qty: 60 TABLET | Refills: 5 | Status: SHIPPED | OUTPATIENT
Start: 2018-02-23 | End: 2018-08-15 | Stop reason: SDUPTHER

## 2018-05-07 RX ORDER — VALACYCLOVIR HYDROCHLORIDE 1 G/1
1000 TABLET, FILM COATED ORAL EVERY 12 HOURS
Qty: 30 TABLET | Refills: 0 | Status: SHIPPED | OUTPATIENT
Start: 2018-05-07 | End: 2018-08-05 | Stop reason: SDUPTHER

## 2018-06-15 RX ORDER — OMEPRAZOLE 40 MG/1
CAPSULE, DELAYED RELEASE ORAL
Qty: 30 CAPSULE | Refills: 11 | Status: SHIPPED | OUTPATIENT
Start: 2018-06-15 | End: 2019-07-15 | Stop reason: SDUPTHER

## 2018-07-13 RX ORDER — MIRABEGRON 50 MG/1
1 TABLET, FILM COATED, EXTENDED RELEASE ORAL DAILY
Qty: 30 TABLET | Refills: 9 | Status: SHIPPED | OUTPATIENT
Start: 2018-07-13 | End: 2019-07-13

## 2018-08-05 ENCOUNTER — OFFICE VISIT (OUTPATIENT)
Dept: URGENT CARE | Facility: CLINIC | Age: 53
End: 2018-08-05
Payer: COMMERCIAL

## 2018-08-05 VITALS
SYSTOLIC BLOOD PRESSURE: 143 MMHG | HEIGHT: 68 IN | RESPIRATION RATE: 18 BRPM | DIASTOLIC BLOOD PRESSURE: 76 MMHG | OXYGEN SATURATION: 100 % | HEART RATE: 93 BPM | BODY MASS INDEX: 36.07 KG/M2 | TEMPERATURE: 98 F | WEIGHT: 238 LBS

## 2018-08-05 DIAGNOSIS — J32.9 SINUSITIS, UNSPECIFIED CHRONICITY, UNSPECIFIED LOCATION: Primary | ICD-10-CM

## 2018-08-05 DIAGNOSIS — Z76.0 MEDICATION REFILL: ICD-10-CM

## 2018-08-05 PROCEDURE — 3008F BODY MASS INDEX DOCD: CPT | Mod: CPTII,S$GLB,, | Performed by: NURSE PRACTITIONER

## 2018-08-05 PROCEDURE — 99214 OFFICE O/P EST MOD 30 MIN: CPT | Mod: S$GLB,,, | Performed by: NURSE PRACTITIONER

## 2018-08-05 RX ORDER — VALACYCLOVIR HYDROCHLORIDE 1 G/1
1000 TABLET, FILM COATED ORAL EVERY 12 HOURS
Qty: 10 TABLET | Refills: 1 | Status: SHIPPED | OUTPATIENT
Start: 2018-08-05 | End: 2019-05-14 | Stop reason: SDUPTHER

## 2018-08-05 RX ORDER — PROMETHAZINE HYDROCHLORIDE AND DEXTROMETHORPHAN HYDROBROMIDE 6.25; 15 MG/5ML; MG/5ML
5 SYRUP ORAL NIGHTLY PRN
Qty: 120 ML | Refills: 0 | Status: SHIPPED | OUTPATIENT
Start: 2018-08-05 | End: 2018-08-15

## 2018-08-05 RX ORDER — AZITHROMYCIN 250 MG/1
TABLET, FILM COATED ORAL
Qty: 6 TABLET | Refills: 0 | Status: SHIPPED | OUTPATIENT
Start: 2018-08-05 | End: 2019-07-31

## 2018-08-05 NOTE — PATIENT INSTRUCTIONS
Symptomatic treatment: (consider the following unless you are allergic or cannot take the following suggestions)  Alternate Tylenol (not to exceed 4000 mg per 24 hours) and Ibuprofen (400 mg every 3 hours) for pain and fever   * do not take tylenol if you have liver disease or issues with your liver   *do not take motrin if you have kidney disease or on blood thinners   For a sore throat try salt water gargles and honey/lemon water to soothe throat  Cepachol spray helps to numb the discomfort in throat  Elderberry to reduce duration of URI symptoms  Warm face compresses/hot showers as often as you can to open sinuses   Vicks vapor rub at night  Flonase or Nasacort (nasal steroid over the counter, 1-2 squirts to each nare)  Nasal saline spray reduces inflammation and dryness  Stay hydrated with increased water intake and simple foods like chicken noodle soup help hydrate and soothe the throat  Drink pedialyte, gatorade or propel.   Delsym helps with coughing at night  Zyrtec, Allegra, or Claritin during the day for allergy relief  Benadryl at night may help if allergy component- do not use with phenergan because both medications can make you drowsy)  You can try breathe right strips at night to help you breathe  A cool mist humidifier in bedroom may help with cough and relieve stuffy nose.   Zantac will help if there is reflux from the post nasal drip  REST as much as you can    Sinus rinses DO NOT USE TAP WATER, if you must, water must be a rolling boil for 1 minute, let it cool, then use.  May use distilled water, or over the counter nasal saline rinses.  Vics vapor rub in shower to help open nasal passages.  May use nasal gel to keep passages moisturized.  May use Nasal saline sprays during the day for added relief of congestion.   For those who go to the gym, please do not use the sauna or steam room now to clear sinuses.    During pollen season, change shirt if you are outside for a while when you go in.  Also  wash your face.  Do not touch your face with your hands.  Wash your hands often in general while ill, avoid face contact with hands.     Your symptoms will likely last 5-7 days, maybe longer depending on how it affects your body.  You are contagious 5-7, so minimize contact with others to reduce the spread to others and stay home from work or school as we discussed. Dehydration is preventable but is one of the main reasons why you will feel so badly.  Antibiotics are not needed unless a complication (such as Otitis Media, Bacterial sinus infection or pneumonia). Taking antibiotics for Flu/Cold is not supported by evidence based medicine and can expose you to unnecessary side effects of the medication, such as anaphylaxis.   If you experience any:  Chest pain, shortness of breath, wheezing or difficulty breathing  Severe headache, face, neck or ear pain  New rash  Fever over 101.5º F (38.6 C) for more than three days  Confusion, behavior change or seizure  Severe weakness or dizziness  Go to ER

## 2018-08-05 NOTE — PROGRESS NOTES
"Subjective:       Patient ID: Funmi Houston is a 52 y.o. female.    Vitals:  height is 5' 8" (1.727 m) and weight is 108 kg (238 lb). Her oral temperature is 97.8 °F (36.6 °C). Her blood pressure is 143/76 (abnormal) and her pulse is 93. Her respiration is 18 and oxygen saturation is 100%.     Chief Complaint: Sinus Problem    Pt states when she cough, she gets a shooting pain that goes through her right jaw into her right ear and the right side of her neck.   Sinus infection in the past         Sinus Problem   This is a recurrent problem. The current episode started in the past 7 days. The problem has been gradually worsening since onset. Associated symptoms include chills, congestion, coughing (productive), ear pain, headaches, sinus pressure and a sore throat. Pertinent negatives include no hoarse voice or shortness of breath.     Review of Systems   Constitution: Positive for chills. Negative for fever and malaise/fatigue.   HENT: Positive for congestion, ear pain, sinus pressure and sore throat. Negative for hoarse voice.    Eyes: Negative for discharge and redness.   Cardiovascular: Negative for chest pain, dyspnea on exertion and leg swelling.   Respiratory: Positive for cough (productive) and sputum production. Negative for shortness of breath and wheezing.    Musculoskeletal: Negative for myalgias.   Gastrointestinal: Negative for abdominal pain and nausea.   Neurological: Positive for headaches.       Objective:      Physical Exam   Constitutional: She is oriented to person, place, and time. She appears well-developed and well-nourished. She is cooperative.  Non-toxic appearance. She does not appear ill. No distress.   HENT:   Head: Normocephalic and atraumatic.   Right Ear: Hearing, tympanic membrane, external ear and ear canal normal.   Left Ear: Hearing, tympanic membrane, external ear and ear canal normal.   Nose: No mucosal edema or rhinorrhea. Right sinus exhibits maxillary sinus tenderness and " frontal sinus tenderness. Left sinus exhibits maxillary sinus tenderness and frontal sinus tenderness.   Mouth/Throat: Uvula is midline and mucous membranes are normal. No trismus in the jaw. Normal dentition. No uvula swelling. Posterior oropharyngeal erythema present.   Eyes: Conjunctivae and lids are normal. Right eye exhibits no discharge. Left eye exhibits no discharge. No scleral icterus.   Sclera clear bilat   Neck: Trachea normal, normal range of motion, full passive range of motion without pain and phonation normal. Neck supple.   Cardiovascular: Normal rate, regular rhythm, normal heart sounds, intact distal pulses and normal pulses.    Pulmonary/Chest: Effort normal and breath sounds normal. No respiratory distress.   Abdominal: Normal appearance. She exhibits no pulsatile midline mass.   Musculoskeletal: Normal range of motion. She exhibits no edema or deformity.   Neurological: She is alert and oriented to person, place, and time.   Skin: Skin is warm, dry and intact. She is not diaphoretic.   Psychiatric: She has a normal mood and affect. Her speech is normal and behavior is normal. Judgment and thought content normal. Cognition and memory are normal.   Nursing note and vitals reviewed.      Assessment:       1. Sinusitis, unspecified chronicity, unspecified location    2. Medication refill        Plan:         Sinusitis, unspecified chronicity, unspecified location    Medication refill    Other orders  -     azithromycin (ZITHROMAX) 250 MG tablet; Take 2 pills on day one and then one pill daily for 4 days  Dispense: 6 tablet; Refill: 0  -     valACYclovir (VALTREX) 1000 MG tablet; Take 1 tablet (1,000 mg total) by mouth every 12 (twelve) hours.  Dispense: 10 tablet; Refill: 1  -     promethazine-dextromethorphan (PROMETHAZINE-DM) 6.25-15 mg/5 mL Syrp; Take 5 mLs by mouth nightly as needed.  Dispense: 120 mL; Refill: 0      Patient Instructions   Symptomatic treatment: (consider the following unless  you are allergic or cannot take the following suggestions)  Alternate Tylenol (not to exceed 4000 mg per 24 hours) and Ibuprofen (400 mg every 3 hours) for pain and fever   * do not take tylenol if you have liver disease or issues with your liver   *do not take motrin if you have kidney disease or on blood thinners   For a sore throat try salt water gargles and honey/lemon water to soothe throat  Cepachol spray helps to numb the discomfort in throat  Elderberry to reduce duration of URI symptoms  Warm face compresses/hot showers as often as you can to open sinuses   Vicks vapor rub at night  Flonase or Nasacort (nasal steroid over the counter, 1-2 squirts to each nare)  Nasal saline spray reduces inflammation and dryness  Stay hydrated with increased water intake and simple foods like chicken noodle soup help hydrate and soothe the throat  Drink pedialyte, gatorade or propel.   Delsym helps with coughing at night  Zyrtec, Allegra, or Claritin during the day for allergy relief  Benadryl at night may help if allergy component- do not use with phenergan because both medications can make you drowsy)  You can try breathe right strips at night to help you breathe  A cool mist humidifier in bedroom may help with cough and relieve stuffy nose.   Zantac will help if there is reflux from the post nasal drip  REST as much as you can    Sinus rinses DO NOT USE TAP WATER, if you must, water must be a rolling boil for 1 minute, let it cool, then use.  May use distilled water, or over the counter nasal saline rinses.  Vics vapor rub in shower to help open nasal passages.  May use nasal gel to keep passages moisturized.  May use Nasal saline sprays during the day for added relief of congestion.   For those who go to the gym, please do not use the sauna or steam room now to clear sinuses.    During pollen season, change shirt if you are outside for a while when you go in.  Also wash your face.  Do not touch your face with your  hands.  Wash your hands often in general while ill, avoid face contact with hands.     Your symptoms will likely last 5-7 days, maybe longer depending on how it affects your body.  You are contagious 5-7, so minimize contact with others to reduce the spread to others and stay home from work or school as we discussed. Dehydration is preventable but is one of the main reasons why you will feel so badly.  Antibiotics are not needed unless a complication (such as Otitis Media, Bacterial sinus infection or pneumonia). Taking antibiotics for Flu/Cold is not supported by evidence based medicine and can expose you to unnecessary side effects of the medication, such as anaphylaxis.   If you experience any:  Chest pain, shortness of breath, wheezing or difficulty breathing  Severe headache, face, neck or ear pain  New rash  Fever over 101.5º F (38.6 C) for more than three days  Confusion, behavior change or seizure  Severe weakness or dizziness  Go to ER

## 2018-08-17 RX ORDER — DICLOFENAC SODIUM 75 MG/1
75 TABLET, DELAYED RELEASE ORAL 2 TIMES DAILY
Qty: 60 TABLET | Refills: 5 | Status: SHIPPED | OUTPATIENT
Start: 2018-08-17 | End: 2019-07-31

## 2018-10-15 RX ORDER — MIRABEGRON 50 MG/1
TABLET, FILM COATED, EXTENDED RELEASE ORAL
Qty: 30 TABLET | Refills: 9 | Status: SHIPPED | OUTPATIENT
Start: 2018-10-15 | End: 2019-07-31

## 2019-05-12 RX ORDER — VALACYCLOVIR HYDROCHLORIDE 1 G/1
TABLET, FILM COATED ORAL
Qty: 10 TABLET | Refills: 1 | OUTPATIENT
Start: 2019-05-12

## 2019-05-14 RX ORDER — VALACYCLOVIR HYDROCHLORIDE 1 G/1
1000 TABLET, FILM COATED ORAL EVERY 12 HOURS
Qty: 30 TABLET | Refills: 0 | Status: SHIPPED | OUTPATIENT
Start: 2019-05-14 | End: 2019-09-04 | Stop reason: SDUPTHER

## 2019-07-15 DIAGNOSIS — K21.9 GASTROESOPHAGEAL REFLUX DISEASE, ESOPHAGITIS PRESENCE NOT SPECIFIED: Primary | ICD-10-CM

## 2019-07-15 NOTE — PROGRESS NOTES
Refill Authorization Note     is requesting a refill authorization.    Brief assessment and rationale for refill: ROUTE: OP  Name and strength of medication: OMEPRAZOLE DR 40 MG CAPSULE            Medication reconciliation completed: No              How patient will take medication: t1t qd          Comments: APPOINTMENTS (past 12m or future 3m authorizing provider)  LAST VISIT DATE  Abelardo Burks MD 8/11/2017         NEXT VISIT DATE  Abelardo Burks MD 7/31/2019

## 2019-07-16 RX ORDER — OMEPRAZOLE 40 MG/1
CAPSULE, DELAYED RELEASE ORAL
Qty: 90 CAPSULE | Refills: 0 | Status: SHIPPED | OUTPATIENT
Start: 2019-07-16 | End: 2019-10-19 | Stop reason: SDUPTHER

## 2019-07-17 ENCOUNTER — PATIENT OUTREACH (OUTPATIENT)
Dept: ADMINISTRATIVE | Facility: HOSPITAL | Age: 54
End: 2019-07-17

## 2019-07-17 DIAGNOSIS — Z12.39 BREAST CANCER SCREENING: Primary | ICD-10-CM

## 2019-07-17 NOTE — LETTER
July 25, 2019    Funmi Houston  [address] [place] [address]             [facility]  [address]  [place] [address]  Phone: [phone] Dear Mrs. Houston:    We have tried to reach you by mychart unsuccessfully.    [facility] is committed to your overall health.  To help you get the most out of each of your visits, we will review your information to make sure you are up to date on all of your recommended tests and/or procedures.       Abelardo Burks MD   has found that your chart shows you may be due for the following:     TETANUS VACCINE   Colonoscopy   Mammogram, order placed     If you have had any of the above done at another facility, please bring the records with you or fax them to [phone] so that your record at [facility] will be complete. If you have not had any of these tests or procedures done recently and would like to complete this testing ,  please call [phone] or send a message through your [facility] portal to your provider's office.     If you have an upcoming scheduled appointment for the above test and/or procedures, please disregard this letter.     If you are currently taking medication, please bring it with you to your appointment for review.     Sincerely,     Your [facility] Care[facility]   Clinical Care Coordinator   [facility]         If you have any questions or concerns, please don't hesitate to call.

## 2019-07-25 NOTE — PROGRESS NOTES
Health Maintenance Due   Topic Date Due    TETANUS VACCINE  08/23/1982    Shingles Vaccine (1 of 2) 08/23/2014    Colonoscopy  08/23/2014    Mammogram  10/03/2018   Chart review completed 07/17/2019  Future Appointments   Date Time Provider Department Center   7/31/2019  7:40 AM Abelardo Burks MD [facility] MED [place]   7/31/2019  8:30 AM Eunice Rodriguez MD [facility] DERM [place]       
Letter sent as mychart unread.    
moderate assist (50% patients effort)

## 2019-07-30 ENCOUNTER — TELEPHONE (OUTPATIENT)
Dept: FAMILY MEDICINE | Facility: CLINIC | Age: 54
End: 2019-07-30

## 2019-07-30 NOTE — TELEPHONE ENCOUNTER
----- Message from Mason Dick sent at 7/30/2019 12:45 PM CDT -----  Contact: same  Patient called in and stated she was returning a call from earlier today.  Patient stated she knows she has an appt tomorrow 7/31/19.  Patient call back number is [phone]

## 2019-07-31 ENCOUNTER — OFFICE VISIT (OUTPATIENT)
Dept: FAMILY MEDICINE | Facility: CLINIC | Age: 54
End: 2019-07-31
Payer: COMMERCIAL

## 2019-07-31 ENCOUNTER — LAB VISIT (OUTPATIENT)
Dept: LAB | Facility: HOSPITAL | Age: 54
End: 2019-07-31
Attending: FAMILY MEDICINE
Payer: COMMERCIAL

## 2019-07-31 ENCOUNTER — OFFICE VISIT (OUTPATIENT)
Dept: DERMATOLOGY | Facility: CLINIC | Age: 54
End: 2019-07-31
Payer: COMMERCIAL

## 2019-07-31 VITALS
RESPIRATION RATE: 16 BRPM | DIASTOLIC BLOOD PRESSURE: 82 MMHG | BODY MASS INDEX: 39.2 KG/M2 | HEART RATE: 77 BPM | OXYGEN SATURATION: 96 % | WEIGHT: 258.63 LBS | SYSTOLIC BLOOD PRESSURE: 134 MMHG | TEMPERATURE: 98 F | HEIGHT: 68 IN

## 2019-07-31 VITALS — BODY MASS INDEX: 39.1 KG/M2 | WEIGHT: 258 LBS | HEIGHT: 68 IN

## 2019-07-31 DIAGNOSIS — Z00.00 ROUTINE MEDICAL EXAM: ICD-10-CM

## 2019-07-31 DIAGNOSIS — Z00.00 ROUTINE MEDICAL EXAM: Primary | ICD-10-CM

## 2019-07-31 DIAGNOSIS — Z12.11 COLON CANCER SCREENING: ICD-10-CM

## 2019-07-31 DIAGNOSIS — N32.81 OAB (OVERACTIVE BLADDER): ICD-10-CM

## 2019-07-31 DIAGNOSIS — K21.9 GASTROESOPHAGEAL REFLUX DISEASE, ESOPHAGITIS PRESENCE NOT SPECIFIED: ICD-10-CM

## 2019-07-31 DIAGNOSIS — D23.9 DERMATOFIBROMA: ICD-10-CM

## 2019-07-31 DIAGNOSIS — D23.9 DERMATOFIBROMA: Primary | ICD-10-CM

## 2019-07-31 LAB
ALBUMIN SERPL BCP-MCNC: 4 G/DL (ref 3.5–5.2)
ALP SERPL-CCNC: 101 U/L (ref 55–135)
ALT SERPL W/O P-5'-P-CCNC: 21 U/L (ref 10–44)
ANION GAP SERPL CALC-SCNC: 10 MMOL/L (ref 8–16)
AST SERPL-CCNC: 20 U/L (ref 10–40)
BILIRUB SERPL-MCNC: 0.5 MG/DL (ref 0.1–1)
BUN SERPL-MCNC: 14 MG/DL (ref 6–20)
CALCIUM SERPL-MCNC: 9.9 MG/DL (ref 8.7–10.5)
CHLORIDE SERPL-SCNC: 103 MMOL/L (ref 95–110)
CHOLEST SERPL-MCNC: 211 MG/DL (ref 120–199)
CHOLEST/HDLC SERPL: 3.4 {RATIO} (ref 2–5)
CO2 SERPL-SCNC: 28 MMOL/L (ref 23–29)
CREAT SERPL-MCNC: 0.8 MG/DL (ref 0.5–1.4)
EST. GFR  (AFRICAN AMERICAN): >60 ML/MIN/1.73 M^2
EST. GFR  (NON AFRICAN AMERICAN): >60 ML/MIN/1.73 M^2
GLUCOSE SERPL-MCNC: 113 MG/DL (ref 70–110)
HDLC SERPL-MCNC: 62 MG/DL (ref 40–75)
HDLC SERPL: 29.4 % (ref 20–50)
LDLC SERPL CALC-MCNC: 130 MG/DL (ref 63–159)
NONHDLC SERPL-MCNC: 149 MG/DL
POTASSIUM SERPL-SCNC: 4.4 MMOL/L (ref 3.5–5.1)
PROT SERPL-MCNC: 7.6 G/DL (ref 6–8.4)
SODIUM SERPL-SCNC: 141 MMOL/L (ref 136–145)
TRIGL SERPL-MCNC: 95 MG/DL (ref 30–150)
TSH SERPL DL<=0.005 MIU/L-ACNC: 0.54 UIU/ML (ref 0.4–4)

## 2019-07-31 PROCEDURE — 3008F BODY MASS INDEX DOCD: CPT | Mod: CPTII,S$GLB,, | Performed by: DERMATOLOGY

## 2019-07-31 PROCEDURE — 3008F PR BODY MASS INDEX (BMI) DOCUMENTED: ICD-10-PCS | Mod: CPTII,S$GLB,, | Performed by: DERMATOLOGY

## 2019-07-31 PROCEDURE — 99999 PR PBB SHADOW E&M-EST. PATIENT-LVL III: ICD-10-PCS | Mod: PBBFAC,,, | Performed by: DERMATOLOGY

## 2019-07-31 PROCEDURE — 99202 OFFICE O/P NEW SF 15 MIN: CPT | Mod: S$GLB,,, | Performed by: DERMATOLOGY

## 2019-07-31 PROCEDURE — 84443 ASSAY THYROID STIM HORMONE: CPT

## 2019-07-31 PROCEDURE — 80053 COMPREHEN METABOLIC PANEL: CPT

## 2019-07-31 PROCEDURE — 86038 ANTINUCLEAR ANTIBODIES: CPT

## 2019-07-31 PROCEDURE — 80061 LIPID PANEL: CPT

## 2019-07-31 PROCEDURE — 99396 PREV VISIT EST AGE 40-64: CPT | Mod: S$GLB,,, | Performed by: FAMILY MEDICINE

## 2019-07-31 PROCEDURE — 99999 PR PBB SHADOW E&M-EST. PATIENT-LVL III: CPT | Mod: PBBFAC,,, | Performed by: FAMILY MEDICINE

## 2019-07-31 PROCEDURE — 99396 PR PREVENTIVE VISIT,EST,40-64: ICD-10-PCS | Mod: S$GLB,,, | Performed by: FAMILY MEDICINE

## 2019-07-31 PROCEDURE — 99999 PR PBB SHADOW E&M-EST. PATIENT-LVL III: ICD-10-PCS | Mod: PBBFAC,,, | Performed by: FAMILY MEDICINE

## 2019-07-31 PROCEDURE — 99999 PR PBB SHADOW E&M-EST. PATIENT-LVL III: CPT | Mod: PBBFAC,,, | Performed by: DERMATOLOGY

## 2019-07-31 PROCEDURE — 99202 PR OFFICE/OUTPT VISIT, NEW, LEVL II, 15-29 MIN: ICD-10-PCS | Mod: S$GLB,,, | Performed by: DERMATOLOGY

## 2019-07-31 PROCEDURE — 36415 COLL VENOUS BLD VENIPUNCTURE: CPT | Mod: PO

## 2019-07-31 RX ORDER — DARIFENACIN 15 MG/1
15 TABLET, EXTENDED RELEASE ORAL DAILY
Refills: 3 | COMMUNITY
Start: 2019-05-13 | End: 2021-01-08

## 2019-07-31 RX ORDER — SUCRALFATE 1 G/1
1 TABLET ORAL 4 TIMES DAILY
Qty: 120 TABLET | Refills: 0 | Status: SHIPPED | OUTPATIENT
Start: 2019-07-31 | End: 2019-09-12 | Stop reason: SDUPTHER

## 2019-07-31 NOTE — PROGRESS NOTES
Subjective:       Patient ID: Funmi Houston is a 53 y.o. female.    Chief Complaint: Annual Exam    HPI     Here for a check up.    Reports that 2 months ago, c/o epigastric fullness and tightness. Brought on by eating foods high in citric acid. Compliant with prilosec and zantac.         Review of Systems      Review of Systems   Constitutional: Negative for fever and chills.   HENT: Negative for hearing loss and neck stiffness.    Eyes: Negative for redness and itching.   Respiratory: Negative for cough and choking.    Cardiovascular: Negative for chest pain and leg swelling.  Abdomen: Negative for abdominal pain and blood in stool.   Genitourinary: Negative for dysuria and flank pain.   Musculoskeletal: Negative for back pain and gait problem.   Neurological: Negative for light-headedness and headaches.   Hematological: Negative for adenopathy.   Psychiatric/Behavioral: Negative for behavioral problems.     Objective:      Physical Exam   Constitutional: She is oriented to person, place, and time. She appears well-developed. No distress.   HENT:   Head: Normocephalic.   Mouth/Throat: Oropharynx is clear and moist.   Eyes: Pupils are equal, round, and reactive to light.   Neck: Normal range of motion. Neck supple. No thyromegaly present.   Cardiovascular: Normal rate, regular rhythm and normal heart sounds. Exam reveals no friction rub.   No murmur heard.  Pulmonary/Chest: Breath sounds normal. She has no wheezes. She has no rales.   Abdominal: Soft. Bowel sounds are normal. She exhibits no distension and no mass. There is no tenderness.   Musculoskeletal: Normal range of motion. She exhibits no edema or tenderness.   Neurological: She is alert and oriented to person, place, and time. She has normal reflexes. No cranial nerve deficit.   Skin: Skin is warm. No rash noted. No erythema.   Psychiatric: She has a normal mood and affect. Thought content normal.       Assessment:       1. Routine medical exam    2. OAB  (overactive bladder)    3. Gastroesophageal reflux disease, esophagitis presence not specified    4. Colon cancer screening        Plan:       Routine medical exam  -     Comprehensive metabolic panel; Future; Expected date: 07/31/2019  -     Lipid panel; Future; Expected date: 07/31/2019  -     TSH; Future; Expected date: 07/31/2019    OAB (overactive bladder)    Gastroesophageal reflux disease, esophagitis presence not specified    Colon cancer screening  -     Case request GI: COLONOSCOPY    Other orders  -     Cancel: (In Office Administered) Tdap Vaccine  -     sucralfate (CARAFATE) 1 gram tablet; Take 1 tablet (1 g total) by mouth 4 (four) times daily.  Dispense: 120 tablet; Refill: 0              Plan:  See orders  Start carafate  Cont all other meds      Medication List with Changes/Refills   New Medications    SUCRALFATE (CARAFATE) 1 GRAM TABLET    Take 1 tablet (1 g total) by mouth 4 (four) times daily.   Current Medications    DARIFENACIN (ENABLEX) 15 MG 24 HR TABLET    Take 15 mg by mouth once daily.    ESTRADIOL (ESTRACE) 0.5 MG TABLET    Take 0.5 mg by mouth once daily.      MULTIVITAMIN (ONE DAILY MULTIVITAMIN) PER TABLET    Take 1 tablet by mouth once daily.    OMEPRAZOLE (PRILOSEC) 40 MG CAPSULE    TAKE 1 CAPSULE (40 MG TOTAL) BY MOUTH ONCE DAILY.    RANITIDINE (ZANTAC) 300 MG TABLET    TAKE 1 TABLET (300 MG TOTAL) BY MOUTH EVERY EVENING. , ABOUT 30-60 MINUTES BEFORE BEDTIME.    VALACYCLOVIR (VALTREX) 1000 MG TABLET    TAKE 1 TABLET (1,000 MG TOTAL) BY MOUTH EVERY 12 (TWELVE) HOURS.   Discontinued Medications    AZITHROMYCIN (ZITHROMAX) 250 MG TABLET    Take 2 pills on day one and then one pill daily for 4 days    DICLOFENAC (VOLTAREN) 75 MG EC TABLET    TAKE 1 TABLET (75 MG TOTAL) BY MOUTH 2 (TWO) TIMES DAILY.    ESOMEPRAZOLE MAGNESIUM (NEXIUM) 10 MG GRPS    Take 10 mg by mouth before breakfast.    MYRBETRIQ 50 MG TB24    TAKE 1 TABLET (50 MG TOTAL) BY MOUTH ONCE DAILY.

## 2019-07-31 NOTE — PROGRESS NOTES
Subjective:       Patient ID:  Funmi Houston is a 53 y.o. female who presents for   Chief Complaint   Patient presents with    Spot     52 y/o F presents for initial visit for spots on her shoulder, arms, legs x 5 years.  She describes them as itching, changing in color. She denies known trauma.  No known insect bites. No prior tx     No h/o skin cancer. Denies family h/o melanoma  .  Past Medical History:   Diagnosis Date    Edema     History of endometriosis     Restless leg syndrome      Review of Systems   Constitutional: Negative for fever, chills and fatigue.   Skin: Positive for itching, dry skin, daily sunscreen use, activity-related sunscreen use and wears hat. Negative for rash.        Objective:    Physical Exam   Constitutional: She appears well-developed and well-nourished.   Neurological: She is alert and oriented to person, place, and time.   Psychiatric: She has a normal mood and affect.   Skin:   Areas Examined (abnormalities noted in diagram):   Head / Face Inspection Performed  RUE Inspected  LUE Inspection Performed  RLE Inspected  LLE Inspection Performed              Diagram Legend     Erythematous scaling macule/papule c/w actinic keratosis       Vascular papule c/w angioma      Pigmented verrucoid papule/plaque c/w seborrheic keratosis      Yellow umbilicated papule c/w sebaceous hyperplasia      Irregularly shaped tan macule c/w lentigo     1-2 mm smooth white papules consistent with Milia      Movable subcutaneous cyst with punctum c/w epidermal inclusion cyst      Subcutaneous movable cyst c/w pilar cyst      Firm pink to brown papule c/w dermatofibroma      Pedunculated fleshy papule(s) c/w skin tag(s)      Evenly pigmented macule c/w junctional nevus     Mildly variegated pigmented, slightly irregular-bordered macule c/w mildly atypical nevus      Flesh colored to evenly pigmented papule c/w intradermal nevus       Pink pearly papule/plaque c/w basal cell carcinoma       Erythematous hyperkeratotic cursted plaque c/w SCC      Surgical scar with no sign of skin cancer recurrence      Open and closed comedones      Inflammatory papules and pustules      Verrucoid papule consistent consistent with wart     Erythematous eczematous patches and plaques     Dystrophic onycholytic nail with subungual debris c/w onychomycosis     Umbilicated papule    Erythematous-base heme-crusted tan verrucoid plaque consistent with inflamed seborrheic keratosis     Erythematous Silvery Scaling Plaque c/w Psoriasis     See annotation      Assessment / Plan:        Dermatofibromas, multiple (8)  Discussed the article Associated conditions in patients with multiple dermatofibromas:Case reports and literature review  Will order MELANIE  She may decide to have lesion on L thigh removed in the future since it is tender. Discussed the potential for recurrence  -     MELANIE; Future; Expected date: 07/31/2019           Follow up for Pt will call for follow up.

## 2019-08-01 LAB — ANA SER QL IF: NORMAL

## 2019-08-02 ENCOUNTER — PATIENT MESSAGE (OUTPATIENT)
Dept: DERMATOLOGY | Facility: CLINIC | Age: 54
End: 2019-08-02

## 2019-08-29 RX ORDER — SUCRALFATE 1 G/1
1 TABLET ORAL 4 TIMES DAILY
Qty: 120 TABLET | Refills: 0 | OUTPATIENT
Start: 2019-08-29

## 2019-08-29 NOTE — PROGRESS NOTES
Refill Authorization Note     is requesting a refill authorization.    Brief assessment and rationale for refill: QUICK DC: rts (10/19)  Name and strength of medication: SUCRALFATE 1 GM TABLET       Medication Therapy Plan: 3-month rx sent 7/31/19; refill too soon until 10/19                   How patient will take medication: t1t po qid          Lab Results   Component Value Date    CREATININE 0.8 07/31/2019    BUN 14 07/31/2019     07/31/2019    K 4.4 07/31/2019     07/31/2019    CO2 28 07/31/2019     APPOINTMENTS (past 12m or future 3m authorizing provider)   Date Provider   LAST VISIT  7/31/2019 Abelardo Burks MD   NEXT VISIT  Visit date not found Abelardo Burks MD

## 2019-09-04 DIAGNOSIS — B00.1 FEVER BLISTER: Primary | ICD-10-CM

## 2019-09-05 NOTE — PROGRESS NOTES
Refill Authorization Note     is requesting a refill authorization.    Brief assessment and rationale for refill: ROUTE: op / NCO  Name and strength of medication: valACYclovir (VALTREX) 1000 MG tablet       Medication Therapy Plan: Fever blisters- Not commented on, Outside of protocol, Route to you     Medication reconciliation completed: No              How patient will take medication: t1t po q12          Comments:   APPOINTMENTS past 12m or future 3m    Date Provider   LAST VISIT  7/31/2019 Abelardo Burks MD   NEXT VISIT  Visit date not found Abelardo Burks MD

## 2019-09-08 RX ORDER — VALACYCLOVIR HYDROCHLORIDE 1 G/1
1000 TABLET, FILM COATED ORAL EVERY 12 HOURS
Qty: 30 TABLET | Refills: 2 | Status: SHIPPED | OUTPATIENT
Start: 2019-09-08 | End: 2020-04-03 | Stop reason: SDUPTHER

## 2019-09-12 DIAGNOSIS — K21.9 GASTROESOPHAGEAL REFLUX DISEASE, ESOPHAGITIS PRESENCE NOT SPECIFIED: Primary | ICD-10-CM

## 2019-09-12 RX ORDER — SUCRALFATE 1 G/1
1 TABLET ORAL 4 TIMES DAILY
Qty: 360 TABLET | Refills: 0 | Status: SHIPPED | OUTPATIENT
Start: 2019-09-12 | End: 2019-12-07 | Stop reason: SDUPTHER

## 2019-09-12 NOTE — PROGRESS NOTES
Refill Authorization Note     is requesting a refill authorization.    Brief assessment and rationale for refill: DEFER: NEW START  Name and strength of medication:  SUCRALFATE 1 GM TABLET       Medication Therapy Plan: LCO/LOV (7/19) START CARAFATE FOR GERD; NEW START AS OF 7/19; DEFER TO YOU                    How patient will take medication: T1T PO QID          Lab Results   Component Value Date    CREATININE 0.8 07/31/2019    BUN 14 07/31/2019     07/31/2019    K 4.4 07/31/2019     07/31/2019    CO2 28 07/31/2019     APPOINTMENTS (past 12m or future 3m authorizing provider)   Date Provider   LAST VISIT  7/31/2019 Abelardo Burks MD   NEXT VISIT  Visit date not found Abelardo Burks MD

## 2019-09-12 NOTE — TELEPHONE ENCOUNTER
Please see the following assessment. This refill request still requires some action on your part. Patient recently started on sucralfate for GERD (7/19). Pending order for 90-day supply of sucralfate 1g. Since this is a recent initiation, defer to you.  Thank you.

## 2019-10-19 DIAGNOSIS — K21.9 GASTROESOPHAGEAL REFLUX DISEASE, ESOPHAGITIS PRESENCE NOT SPECIFIED: ICD-10-CM

## 2019-10-21 NOTE — PROGRESS NOTES
Refill Authorization Note     is requesting a refill authorization.    Brief assessment and rationale for refill: ROUTE: op   Name and strength of medication: OMEPRAZOLE DR 40 MG CAPSULE       Medication Therapy Plan: LCO/LOV-GERD stable(7/19); med outside of protocol; route to you     Medication reconciliation completed: No              How patient will take medication: t1c po qd           Comments:   Appointments past 12m or future 3m    Date Provider   Last Visit   7/31/2019 Abelardo Burks MD   Next Visit   Visit date not found Abelardo Burks MD

## 2019-10-23 RX ORDER — OMEPRAZOLE 40 MG/1
CAPSULE, DELAYED RELEASE ORAL
Qty: 90 CAPSULE | Refills: 1 | Status: SHIPPED | OUTPATIENT
Start: 2019-10-23 | End: 2020-04-27

## 2019-12-07 DIAGNOSIS — K21.9 GASTROESOPHAGEAL REFLUX DISEASE, ESOPHAGITIS PRESENCE NOT SPECIFIED: ICD-10-CM

## 2019-12-09 ENCOUNTER — OFFICE VISIT (OUTPATIENT)
Dept: URGENT CARE | Facility: CLINIC | Age: 54
End: 2019-12-09
Payer: COMMERCIAL

## 2019-12-09 VITALS
HEART RATE: 80 BPM | WEIGHT: 258 LBS | TEMPERATURE: 99 F | SYSTOLIC BLOOD PRESSURE: 180 MMHG | BODY MASS INDEX: 39.1 KG/M2 | DIASTOLIC BLOOD PRESSURE: 91 MMHG | RESPIRATION RATE: 16 BRPM | HEIGHT: 68 IN | OXYGEN SATURATION: 97 %

## 2019-12-09 DIAGNOSIS — R05.9 COUGH: ICD-10-CM

## 2019-12-09 DIAGNOSIS — B96.89 ACUTE BACTERIAL SINUSITIS: Primary | ICD-10-CM

## 2019-12-09 DIAGNOSIS — J01.90 ACUTE BACTERIAL SINUSITIS: Primary | ICD-10-CM

## 2019-12-09 PROCEDURE — 99214 PR OFFICE/OUTPT VISIT, EST, LEVL IV, 30-39 MIN: ICD-10-PCS | Mod: 25,S$GLB,, | Performed by: PHYSICIAN ASSISTANT

## 2019-12-09 PROCEDURE — 96372 THER/PROPH/DIAG INJ SC/IM: CPT | Mod: S$GLB,,, | Performed by: PHYSICIAN ASSISTANT

## 2019-12-09 PROCEDURE — 96372 PR INJECTION,THERAP/PROPH/DIAG2ST, IM OR SUBCUT: ICD-10-PCS | Mod: S$GLB,,, | Performed by: PHYSICIAN ASSISTANT

## 2019-12-09 PROCEDURE — 99214 OFFICE O/P EST MOD 30 MIN: CPT | Mod: 25,S$GLB,, | Performed by: PHYSICIAN ASSISTANT

## 2019-12-09 RX ORDER — AZITHROMYCIN 250 MG/1
TABLET, FILM COATED ORAL
Qty: 6 TABLET | Refills: 0 | Status: ON HOLD | OUTPATIENT
Start: 2019-12-09 | End: 2020-11-30 | Stop reason: CLARIF

## 2019-12-09 RX ORDER — DEXAMETHASONE SODIUM PHOSPHATE 100 MG/10ML
10 INJECTION INTRAMUSCULAR; INTRAVENOUS
Status: COMPLETED | OUTPATIENT
Start: 2019-12-09 | End: 2019-12-09

## 2019-12-09 RX ORDER — BENZONATATE 100 MG/1
200 CAPSULE ORAL 3 TIMES DAILY PRN
Qty: 30 CAPSULE | Refills: 0 | Status: SHIPPED | OUTPATIENT
Start: 2019-12-09 | End: 2019-12-19

## 2019-12-09 RX ORDER — PROMETHAZINE HYDROCHLORIDE AND DEXTROMETHORPHAN HYDROBROMIDE 6.25; 15 MG/5ML; MG/5ML
5 SYRUP ORAL EVERY 6 HOURS
Qty: 120 ML | Refills: 0 | Status: ON HOLD | OUTPATIENT
Start: 2019-12-09 | End: 2020-11-30 | Stop reason: CLARIF

## 2019-12-09 RX ADMIN — DEXAMETHASONE SODIUM PHOSPHATE 10 MG: 100 INJECTION INTRAMUSCULAR; INTRAVENOUS at 06:12

## 2019-12-09 NOTE — PROGRESS NOTES
Refill Routing Note     Medication(s) are not appropriate for processing by [facility]:    Medication Outside of Protocol    Appointments  past 15m or future 3m with PCP    Date Provider   Last Visit   7/31/2019 Abelardo Burks MD   Next Visit   Visit date not found Abelardo Burks MD       Automatic Epic Protocol Generated Data:    Requested Prescriptions   Pending Prescriptions Disp Refills    sucralfate (CARAFATE) 1 gram tablet [Pharmacy Med Name: SUCRALFATE 1 GM TABLET] 360 tablet 0     Sig: TAKE 1 TABLET (1 G TOTAL) BY MOUTH 4 (FOUR) TIMES DAILY.       There is no refill protocol information for this order

## 2019-12-09 NOTE — PROGRESS NOTES
"Subjective:       Patient ID: Funmi Houston is a 54 y.o. female.    Vitals:  height is 5' 8" (1.727 m) and weight is 117 kg (258 lb). Her oral temperature is 98.6 °F (37 °C). Her blood pressure is 180/91 (abnormal) and her pulse is 80. Her respiration is 16 and oxygen saturation is 97%.     Chief Complaint: Cough    Cough   This is a new problem. The current episode started 1 to 4 weeks ago (2 weeks). The problem has been gradually worsening. The problem occurs constantly. The cough is productive of sputum. Associated symptoms include ear congestion, nasal congestion, postnasal drip and rhinorrhea. Pertinent negatives include no chest pain, chills, ear pain, eye redness, fever, headaches, heartburn, hemoptysis, myalgias, rash, sore throat, shortness of breath, sweats, weight loss or wheezing. Nothing aggravates the symptoms. She has tried body position changes and rest (claritin, mucinex dm and flonase) for the symptoms. The treatment provided no relief.       Constitution: Negative for chills, sweating, fatigue and fever.   HENT: Positive for congestion, postnasal drip, sinus pain and sinus pressure. Negative for ear pain, nosebleeds, foreign body in nose, sore throat, trouble swallowing and voice change.    Neck: Negative for neck pain, neck stiffness, painful lymph nodes and neck swelling.   Cardiovascular: Negative for chest pain, leg swelling, palpitations, sob on exertion and passing out.   Eyes: Negative for eye pain, eye redness, photophobia, double vision, blurred vision and eyelid swelling.   Respiratory: Positive for cough and sputum production. Negative for chest tightness, bloody sputum, shortness of breath, stridor and wheezing.    Gastrointestinal: Negative for abdominal pain, abdominal bloating, nausea, vomiting, constipation, diarrhea and heartburn.   Genitourinary: Negative for dysuria, frequency, urgency and history of kidney stones.   Musculoskeletal: Negative for joint pain, joint swelling, " abnormal ROM of joint, back pain, muscle cramps and muscle ache.   Skin: Negative for color change, pale, rash, bruising and hives.   Allergic/Immunologic: Negative for seasonal allergies, hives, itching and sneezing.   Neurological: Negative for dizziness, light-headedness, passing out, facial drooping, speech difficulty, loss of balance, headaches, altered mental status, loss of consciousness and seizures.   Hematologic/Lymphatic: Negative for swollen lymph nodes.   Psychiatric/Behavioral: Negative for altered mental status, nervous/anxious, sleep disturbance and depression. The patient is not nervous/anxious.        Objective:      Physical Exam   Constitutional: She is oriented to person, place, and time. She appears well-developed and well-nourished. She is cooperative.  Non-toxic appearance. She does not have a sickly appearance. She does not appear ill. No distress.   HENT:   Head: Normocephalic and atraumatic.   Right Ear: Hearing, tympanic membrane, external ear and ear canal normal.   Left Ear: Hearing, tympanic membrane, external ear and ear canal normal.   Nose: Mucosal edema and rhinorrhea present. No nasal deformity. No epistaxis. Right sinus exhibits maxillary sinus tenderness and frontal sinus tenderness. Left sinus exhibits maxillary sinus tenderness and frontal sinus tenderness.   Mouth/Throat: Uvula is midline and mucous membranes are normal. No trismus in the jaw. Normal dentition. No uvula swelling. Posterior oropharyngeal erythema and cobblestoning present. No oropharyngeal exudate or posterior oropharyngeal edema.   Eyes: Conjunctivae and lids are normal. No scleral icterus.   Neck: Trachea normal, normal range of motion, full passive range of motion without pain and phonation normal. Neck supple. No neck rigidity. No edema and no erythema present.   Cardiovascular: Normal rate, regular rhythm, normal heart sounds, intact distal pulses and normal pulses.   Pulmonary/Chest: Effort normal and  breath sounds normal. No accessory muscle usage or stridor. No respiratory distress. She has no decreased breath sounds. She has no wheezes. She has no rhonchi. She has no rales.   Abdominal: Normal appearance.   Musculoskeletal: Normal range of motion. She exhibits no edema or deformity.   Lymphadenopathy:     She has no cervical adenopathy.   Neurological: She is alert and oriented to person, place, and time. She exhibits normal muscle tone. Coordination normal.   Skin: Skin is warm, dry, intact, not diaphoretic, not pale and no rash. Capillary refill takes less than 2 seconds.   Psychiatric: She has a normal mood and affect. Her speech is normal and behavior is normal. Judgment and thought content normal. Cognition and memory are normal.   Nursing note and vitals reviewed.        Assessment:       1. Acute bacterial sinusitis    2. Cough        Plan:     Patient is allergic to PCNs and discussed plan of care with patient and patient requesting Azithromycin for treatment. PRO/CONs of Oral Steroids versus IM Steroids, but patient still requesting IM at this time. Patient aware and verbalized understanding.     Acute bacterial sinusitis  -     azithromycin (Z-NASIMA) 250 MG tablet; Take 2 tablets by mouth on day 1; Take 1 tablet by mouth on days 2-5  Dispense: 6 tablet; Refill: 0    Cough  -     benzonatate (TESSALON PERLES) 100 MG capsule; Take 2 capsules (200 mg total) by mouth 3 (three) times daily as needed for Cough.  Dispense: 30 capsule; Refill: 0  -     promethazine-dextromethorphan (PROMETHAZINE-DM) 6.25-15 mg/5 mL Syrp; Take 5 mLs by mouth every 6 (six) hours.  Dispense: 120 mL; Refill: 0    Other orders  -     dexamethasone injection 10 mg      Patient Instructions     If you were prescribed a narcotic or controlled medication, do not drive or operate heavy equipment or machinery while taking these medications.  You must understand that you've received an Urgent Care treatment only and that you may be  released before all your medical problems are known or treated. You, the patient, will arrange for follow up care as instructed.  Follow up with your PCP or specialty clinic as directed if not improved or as needed. You can call [phone] to schedule an appointment with the appropriate provider.  If your condition worsens we recommend that you receive another evaluation at the Emergency Department for any concerns or worsening of condition.  Patient aware and verbalized understanding.    You received a steroid shot today - this can elevate your blood pressure, elevate your blood sugar, water weight gain, nervous energy, redness to the face and dimpling of the skin where the shot goes in.   Patient aware, verbalized understanding and agreed with plan of care.    Increase fluids and rest is important.  Avoid contact with sick individuals.  Humidifier use at home.  Azithromycin RX as prescribed for sinusitis.  Tessalon Perles RX as prescribed for daytime cough.  Cough Syrup RX as prescribed for nighttime cough - will make you drowsy.  OTC Claritin or Zyrtec daily as directed.  Flonase Nasal [place] as directed for nasal congestion.  OTC Tylenol or Motrin every 4 - 6 hours as needed for fever or pain.  Follow-up with your PCP in the next 48hrs or sooner for re-evaluation especially if no improvement in symptoms.  ER precautions given to patient.  Patient aware and verbalized understanding.    Self-Care for Sinusitis     Drinking plenty of water can help sinuses drain.   Sinusitis can often be managed with self-care. Self-care can keep sinuses moist and make you feel more comfortable. Remember to follow your doctor's instructions closely. This can make a big difference in getting your sinus problem under control.  Drink fluids  Drinking extra fluids helps thin your mucus. This lets it drain from your sinuses more easily. Have a glass of water every hour or two. A humidifier helps in much the same way. Fluids can also  offset the drying effects of certain medicines. If you use a humidifier, follow the product maker's instructions on how to use it. Clean it on a regular schedule.  Use saltwater rinses  Rinses help keep your sinuses and nose moist. Mix a teaspoon of salt in 8 ounces of fresh, warm water. Use a bulb syringe to gently squirt the water into your nose a few times a day. You can also buy ready-made saline nasal sprays.  Apply hot or cold packs  Applying heat to the area surrounding your sinuses may make you feel more comfortable. Use a hot water bottle or a hand towel dipped in hot water. Some people also find ice packs effective for relieving pain.  Medicines  Your doctor may prescribe medications to help treat your sinusitis. If you have an infection, antibiotics can help clear it up. If you are prescribed antibiotics, take all pills on schedule until they are gone, even if you feel better. Decongestants help relieve swelling. Use decongestant sprays for short periods only under the direction of your doctor. If you have allergies, your doctor may prescribe medications to help relieve them.   Date Last Reviewed: 10/1/2016  © 4045-8107 [employer]. [address] [place] [address], [place] [address]. All rights reserved. This information is not intended as a substitute for professional medical care. Always follow your healthcare professional's instructions.

## 2019-12-10 RX ORDER — SUCRALFATE 1 G/1
1 TABLET ORAL 4 TIMES DAILY
Qty: 360 TABLET | Refills: 0 | Status: ON HOLD | OUTPATIENT
Start: 2019-12-10 | End: 2020-11-30 | Stop reason: CLARIF

## 2019-12-10 NOTE — PATIENT INSTRUCTIONS
If you were prescribed a narcotic or controlled medication, do not drive or operate heavy equipment or machinery while taking these medications.  You must understand that you've received an Urgent Care treatment only and that you may be released before all your medical problems are known or treated. You, the patient, will arrange for follow up care as instructed.  Follow up with your PCP or specialty clinic as directed if not improved or as needed. You can call [phone] to schedule an appointment with the appropriate provider.  If your condition worsens we recommend that you receive another evaluation at the Emergency Department for any concerns or worsening of condition.  Patient aware and verbalized understanding.    You received a steroid shot today - this can elevate your blood pressure, elevate your blood sugar, water weight gain, nervous energy, redness to the face and dimpling of the skin where the shot goes in.   Patient aware, verbalized understanding and agreed with plan of care.    Increase fluids and rest is important.  Avoid contact with sick individuals.  Humidifier use at home.  Azithromycin RX as prescribed for sinusitis.  Tessalon Perles RX as prescribed for daytime cough.  Cough Syrup RX as prescribed for nighttime cough - will make you drowsy.  OTC Claritin or Zyrtec daily as directed.  Flonase Nasal [place] as directed for nasal congestion.  OTC Tylenol or Motrin every 4 - 6 hours as needed for fever or pain.  Follow-up with your PCP in the next 48hrs or sooner for re-evaluation especially if no improvement in symptoms.  ER precautions given to patient.  Patient aware and verbalized understanding.    Self-Care for Sinusitis     Drinking plenty of water can help sinuses drain.   Sinusitis can often be managed with self-care. Self-care can keep sinuses moist and make you feel more comfortable. Remember to follow your doctor's instructions closely. This can make a big difference in getting your  sinus problem under control.  Drink fluids  Drinking extra fluids helps thin your mucus. This lets it drain from your sinuses more easily. Have a glass of water every hour or two. A humidifier helps in much the same way. Fluids can also offset the drying effects of certain medicines. If you use a humidifier, follow the product maker's instructions on how to use it. Clean it on a regular schedule.  Use saltwater rinses  Rinses help keep your sinuses and nose moist. Mix a teaspoon of salt in 8 ounces of fresh, warm water. Use a bulb syringe to gently squirt the water into your nose a few times a day. You can also buy ready-made saline nasal sprays.  Apply hot or cold packs  Applying heat to the area surrounding your sinuses may make you feel more comfortable. Use a hot water bottle or a hand towel dipped in hot water. Some people also find ice packs effective for relieving pain.  Medicines  Your doctor may prescribe medications to help treat your sinusitis. If you have an infection, antibiotics can help clear it up. If you are prescribed antibiotics, take all pills on schedule until they are gone, even if you feel better. Decongestants help relieve swelling. Use decongestant sprays for short periods only under the direction of your doctor. If you have allergies, your doctor may prescribe medications to help relieve them.   Date Last Reviewed: 10/1/2016  © 2546-5153 [employer]. [address] [place] [address], [place] [address]. All rights reserved. This information is not intended as a substitute for professional medical care. Always follow your healthcare professional's instructions.

## 2020-01-07 ENCOUNTER — PATIENT MESSAGE (OUTPATIENT)
Dept: FAMILY MEDICINE | Facility: CLINIC | Age: 55
End: 2020-01-07

## 2020-01-10 ENCOUNTER — PATIENT MESSAGE (OUTPATIENT)
Dept: FAMILY MEDICINE | Facility: CLINIC | Age: 55
End: 2020-01-10

## 2020-01-10 RX ORDER — FAMOTIDINE 40 MG/1
40 TABLET, FILM COATED ORAL NIGHTLY PRN
Qty: 30 TABLET | Refills: 2 | Status: SHIPPED | OUTPATIENT
Start: 2020-01-10 | End: 2020-04-13

## 2020-01-28 ENCOUNTER — TELEPHONE (OUTPATIENT)
Dept: GASTROENTEROLOGY | Facility: CLINIC | Age: 55
End: 2020-01-28

## 2020-02-04 ENCOUNTER — TELEPHONE (OUTPATIENT)
Dept: GASTROENTEROLOGY | Facility: CLINIC | Age: 55
End: 2020-02-04

## 2020-02-12 ENCOUNTER — TELEPHONE (OUTPATIENT)
Dept: GASTROENTEROLOGY | Facility: CLINIC | Age: 55
End: 2020-02-12

## 2020-02-12 NOTE — TELEPHONE ENCOUNTER
Three unsuccessful attempts to contact. Msg left informing pt that order for cscope will be canceled and we will notify her PCP.

## 2020-02-12 NOTE — TELEPHONE ENCOUNTER
----- Message from Princess DARA Small sent at 2/12/2020  4:11 PM CST -----  Contact: Patient  Type:  Patient Returning Call    Who Called:  Patient  Who Left Message for Patient:  Nurse Daria  Does the patient know what this is regarding?:  yes  Best Call Back Number:     Additional Information:  Patient is calling to schedule the Cscope please call to assist.

## 2020-04-03 DIAGNOSIS — B00.1 FEVER BLISTER: ICD-10-CM

## 2020-04-07 DIAGNOSIS — K21.9 GASTROESOPHAGEAL REFLUX DISEASE, ESOPHAGITIS PRESENCE NOT SPECIFIED: Primary | ICD-10-CM

## 2020-04-07 NOTE — PROGRESS NOTES
Refill Authorization Note     is requesting a refill authorization.    Brief assessment and rationale for refill: APPROVE: NA non-intentional      Medication-related problems identified: Non-adherence (knowledge deficit) non-intentional    Medication Therapy Plan: NTBO(cbc without differential); Lab requirement waived at this time; Per EPIC data 14% adherence; Approve 3 more months    Medication reconciliation completed: No                         Comments:   Refill Center Care Gap Closure protocols temporarily suspended.   Requested Prescriptions   Pending Prescriptions Disp Refills    valACYclovir (VALTREX) 1000 MG tablet 90 tablet 0     Sig: Take 1 tablet (1,000 mg total) by mouth every 12 (twelve) hours.       Antimicrobials:  Oral Antiviral Agents - Anti-Herpetic Failed - 4/7/2020 12:53 PM        Failed - WBC in normal range and within 360 days     WBC   Date Value Ref Range Status   05/25/2005 5.41 4.8 - 10.8 K/uL Final   08/19/2004 4.35 (L) 4.8 - 10.8 K/uL Final              Passed - Patient is at least 18 years old        Passed - Office visit in past 12 months or future 90 days.     Recent Outpatient Visits            4 months ago Acute bacterial sinusitis    [facility] [place] Destiny Don PA-C    8 months ago Dermatofibroma    [place] [facility] Eunice Rodriguez MD    8 months ago Routine medical exam    [facility] Abelardo Burks MD    1 year ago Sinusitis, unspecified chronicity, unspecified location    [facility] [place] Bethany Barron NP    2 years ago Flu-like symptoms    [facility] [place] Sharath Smith MD                    Passed - Cr is 1.3 or below and within 360 days     Creatinine   Date Value Ref Range Status   07/31/2019 0.8 0.5 - 1.4 mg/dL Final   08/15/2017 0.8 0.5 - 1.4 mg/dL Final   06/11/2014 0.8 0.5 - 1.4 mg/dL Final              Passed - eGFR within 360 days     eGFR if non     Date Value Ref Range Status   07/31/2019 >60.0 >60 mL/min/1.73 m^2 Final     Comment:     Calculation used to obtain the estimated glomerular filtration  rate (eGFR) is the CKD-EPI equation.      08/15/2017 >60.0 >60 mL/min/1.73 m^2 Final     Comment:     Calculation used to obtain the estimated glomerular filtration  rate (eGFR) is the CKD-EPI equation. Since race is unknown   in our information system, the eGFR values for   -American and Non--American patients are given   for each creatinine result.     06/11/2014 >60.0 >60 mL/min/1.73 m^2 Final     Comment:     Calculation used to obtain the estimated glomerular filtration  rate (eGFR) is the CKD-EPI equation. Since race is unknown   in our information system, the eGFR values for   -American and Non--American patients are given   for each creatinine result.       eGFR if    Date Value Ref Range Status   07/31/2019 >60.0 >60 mL/min/1.73 m^2 Final   08/15/2017 >60.0 >60 mL/min/1.73 m^2 Final   06/11/2014 >60.0 >60 mL/min/1.73 m^2 Final               Appointments  past 12m or future 3m with PCP    Date Provider   Last Visit   7/31/2019 Abelardo Burks MD   Next Visit   4/7/2020 Abelardo Burks MD   .  ED visits in past 90 days: 0       Note composed:12:57 PM 04/07/2020

## 2020-04-08 RX ORDER — VALACYCLOVIR HYDROCHLORIDE 1 G/1
1000 TABLET, FILM COATED ORAL EVERY 12 HOURS
Qty: 90 TABLET | Refills: 0 | Status: SHIPPED | OUTPATIENT
Start: 2020-04-08 | End: 2020-04-29

## 2020-04-13 RX ORDER — FAMOTIDINE 40 MG/1
40 TABLET, FILM COATED ORAL NIGHTLY PRN
Qty: 90 TABLET | Refills: 1 | Status: SHIPPED | OUTPATIENT
Start: 2020-04-13 | End: 2020-04-22 | Stop reason: SDUPTHER

## 2020-04-13 NOTE — PROGRESS NOTES
Refill Authorization Note     is requesting a refill authorization.    Brief assessment and rationale for refill: APPROVE: prr          Medication Therapy Plan: approve 6 more    Medication reconciliation completed: No                         Comments:   Refill Center Care Gap Closure protocols temporarily suspended.   Requested Prescriptions   Pending Prescriptions Disp Refills    famotidine (PEPCID) 40 MG tablet [Pharmacy Med Name: FAMOTIDINE 40 MG TABLET] 90 tablet 1     Sig: TAKE 1 TABLET (40 MG TOTAL) BY MOUTH NIGHTLY AS NEEDED FOR HEARTBURN.       Gastroenterology: H2 Antagonists - famotidine Passed - 4/7/2020 11:08 AM        Passed - Patient is at least 18 years old        Passed - Office visit in past 12 months or future 90 days.     Recent Outpatient Visits            4 months ago Acute bacterial sinusitis    [facility] [place] Destiny Don PA-C    8 months ago Dermatofibroma    [place] - Dermatology Eunice Rodriguez MD    8 months ago Routine medical exam    [facility] Abelardo Burks MD    1 year ago Sinusitis, unspecified chronicity, unspecified location    [facility] [place] Bethany Barron NP    2 years ago Flu-like symptoms    [facility] [place] Sharath Smith MD                    Passed - Cr is 1.3 or below and within 360 days     Creatinine   Date Value Ref Range Status   07/31/2019 0.8 0.5 - 1.4 mg/dL Final   08/15/2017 0.8 0.5 - 1.4 mg/dL Final   06/11/2014 0.8 0.5 - 1.4 mg/dL Final              Passed - eGFR is 60 or above and within 360 days     eGFR if non    Date Value Ref Range Status   07/31/2019 >60.0 >60 mL/min/1.73 m^2 Final     Comment:     Calculation used to obtain the estimated glomerular filtration  rate (eGFR) is the CKD-EPI equation.      08/15/2017 >60.0 >60 mL/min/1.73 m^2 Final     Comment:     Calculation used to obtain the estimated glomerular filtration  rate (eGFR) is the  CKD-EPI equation. Since race is unknown   in our information system, the eGFR values for   -American and Non--American patients are given   for each creatinine result.     06/11/2014 >60.0 >60 mL/min/1.73 m^2 Final     Comment:     Calculation used to obtain the estimated glomerular filtration  rate (eGFR) is the CKD-EPI equation. Since race is unknown   in our information system, the eGFR values for   -American and Non--American patients are given   for each creatinine result.       eGFR if    Date Value Ref Range Status   07/31/2019 >60.0 >60 mL/min/1.73 m^2 Final   08/15/2017 >60.0 >60 mL/min/1.73 m^2 Final   06/11/2014 >60.0 >60 mL/min/1.73 m^2 Final               Appointments  past 12m or future 3m with PCP    Date Provider   Last Visit   7/31/2019 Abelardo Burks MD   Next Visit   Visit date not found Abelardo Burks MD   .  ED visits in past 90 days: 0       Note composed:10:47 AM 04/13/2020

## 2020-04-21 DIAGNOSIS — K21.9 GASTROESOPHAGEAL REFLUX DISEASE, ESOPHAGITIS PRESENCE NOT SPECIFIED: ICD-10-CM

## 2020-04-22 DIAGNOSIS — K21.9 GASTROESOPHAGEAL REFLUX DISEASE, ESOPHAGITIS PRESENCE NOT SPECIFIED: ICD-10-CM

## 2020-04-22 RX ORDER — FAMOTIDINE 40 MG/1
40 TABLET, FILM COATED ORAL NIGHTLY PRN
Qty: 90 TABLET | Refills: 3 | Status: SHIPPED | OUTPATIENT
Start: 2020-04-22 | End: 2020-10-14 | Stop reason: SDUPTHER

## 2020-04-25 ENCOUNTER — TELEPHONE (OUTPATIENT)
Dept: FAMILY MEDICINE | Facility: CLINIC | Age: 55
End: 2020-04-25

## 2020-04-25 DIAGNOSIS — B00.1 FEVER BLISTER: ICD-10-CM

## 2020-04-25 DIAGNOSIS — E78.5 HYPERLIPIDEMIA, UNSPECIFIED HYPERLIPIDEMIA TYPE: Primary | ICD-10-CM

## 2020-04-27 NOTE — PROGRESS NOTES
Refill Routing Note   Medication(s) are not appropriate for processing by [facility]:       Drug-Drug Interaction (omeprazola and famotidine)     Medication-related problems identified:   Requires appointment  Drug-drug interaction    Medication Therapy Plan: Drug drug interaction; Pt taking omeprazole and famotidine: Famotidine was started (1/20) after ranitidine was recalled: Needs appt (Follow -GERD)    Medication reconciliation completed: No      Automatic Epic Protocol Generated Data:    Requested Prescriptions   Pending Prescriptions Disp Refills    omeprazole (PRILOSEC) 40 MG capsule [Pharmacy Med Name: OMEPRAZOLE DR 40 MG CAPSULE] 90 capsule 0     Sig: TAKE 1 CAPSULE (40 MG TOTAL) BY MOUTH ONCE DAILY.       Gastroenterology: Proton Pump Inhibitors Failed - 4/21/2020 11:14 AM        Failed - Office visit in past 6 months or future 90 days.     Recent Outpatient Visits            4 months ago Acute bacterial sinusitis    [facility] [place] Destiny Don PA-C    9 months ago Dermatofibroma    [place] - Derm[facility] Eunice Rodriguez MD    9 months ago Routine medical exam    [place] - [facility] Medicine Abelardo Burks MD    1 year ago Sinusitis, unspecified chronicity, unspecified location    [facility] [place] Bethany Barron NP    2 years ago Flu-like symptoms    [facility] [place] Sharath Smith MD                    Passed - Patient is at least 18 years old        Passed - Osteoporosis is not on problem list        Passed - Plavix is not on active medication list        Passed - GERD is on problem list           Appointments  past 12m or future 3m with PCP    Date Provider   Last Visit   7/31/2019 Abelardo Burks MD   Next Visit    Abelardo Burks MD   ED visits in past 90 days: 0     Note composed:11:53 AM 04/27/2020       Note composed:11:52 AM 04/27/2020

## 2020-04-28 RX ORDER — OMEPRAZOLE 40 MG/1
CAPSULE, DELAYED RELEASE ORAL
Qty: 90 CAPSULE | Refills: 0 | Status: SHIPPED | OUTPATIENT
Start: 2020-04-28 | End: 2020-07-27

## 2020-04-29 RX ORDER — VALACYCLOVIR HYDROCHLORIDE 1 G/1
1000 TABLET, FILM COATED ORAL EVERY 12 HOURS
Qty: 180 TABLET | Refills: 0 | Status: SHIPPED | OUTPATIENT
Start: 2020-04-29 | End: 2020-10-26 | Stop reason: SDUPTHER

## 2020-04-29 NOTE — PROGRESS NOTES
Please schedule patient for Labs (CBC)    Please also check with your provider if any further labs need to be added and scheduled together.    Thanks !

## 2020-04-29 NOTE — PROGRESS NOTES
Refill Authorization Note     is requesting a refill authorization.    Brief assessment and rationale for refill: APPROVE: needs labs     Medication-related problems identified: Requires labs    Medication Therapy Plan: NTBO(CBC)    Medication reconciliation completed: No                         Comments:   Automatic Epic Protocol Generated Data:    Requested Prescriptions   Pending Prescriptions Disp Refills    valACYclovir (VALTREX) 1000 MG tablet [Pharmacy Med Name: VALACYCLOVIR HCL 1 GRAM TABLET] 180 tablet 0     Sig: TAKE 1 TABLET (1,000 MG TOTAL) BY MOUTH EVERY 12 (TWELVE) HOURS.       Antimicrobials:  Oral Antiviral Agents - Anti-Herpetic Failed - 4/25/2020  7:35 AM        Failed - WBC in normal range and within 360 days     WBC   Date Value Ref Range Status   05/25/2005 5.41 4.8 - 10.8 K/uL Final   08/19/2004 4.35 (L) 4.8 - 10.8 K/uL Final              Passed - Patient is at least 18 years old        Passed - Office visit in past 12 months or future 90 days.     Recent Outpatient Visits            4 months ago Acute bacterial sinusitis    [facility] Destiny Don PA-C    9 months ago Dermatofibroma    [facility] Dermatology Eunice Rodriguez MD    9 months ago Routine medical exam    [facility] Family Medicine Abelardo Burks MD    1 year ago Sinusitis, unspecified chronicity, unspecified location    [facility] Bethany Barron NP    2 years ago Flu-like symptoms    [facility] Sharath Smith MD                    Passed - Cr is 1.3 or below and within 360 days     Creatinine   Date Value Ref Range Status   07/31/2019 0.8 0.5 - 1.4 mg/dL Final   08/15/2017 0.8 0.5 - 1.4 mg/dL Final   06/11/2014 0.8 0.5 - 1.4 mg/dL Final              Passed - eGFR within 360 days     eGFR if non    Date Value Ref Range Status   07/31/2019 >60.0 >60 mL/min/1.73 m^2 Final     Comment:     Calculation used to obtain the  estimated glomerular filtration  rate (eGFR) is the CKD-EPI equation.      08/15/2017 >60.0 >60 mL/min/1.73 m^2 Final     Comment:     Calculation used to obtain the estimated glomerular filtration  rate (eGFR) is the CKD-EPI equation. Since race is unknown   in our information system, the eGFR values for   -American and Non--American patients are given   for each creatinine result.     06/11/2014 >60.0 >60 mL/min/1.73 m^2 Final     Comment:     Calculation used to obtain the estimated glomerular filtration  rate (eGFR) is the CKD-EPI equation. Since race is unknown   in our information system, the eGFR values for   -American and Non--American patients are given   for each creatinine result.       eGFR if    Date Value Ref Range Status   07/31/2019 >60.0 >60 mL/min/1.73 m^2 Final   08/15/2017 >60.0 >60 mL/min/1.73 m^2 Final   06/11/2014 >60.0 >60 mL/min/1.73 m^2 Final                 Appointments  past 12m or future 3m with PCP    Date Provider   Last Visit   7/31/2019 Abelardo Burks MD   Next Visit   Visit date not found Abelardo Burks MD   ED visits in past 90 days: 0     Note composed:3:33 PM 04/29/2020

## 2020-05-01 NOTE — TELEPHONE ENCOUNTER
Dr. Burks,  CBC needed per pharmacy.  There are no labs that I can sign per [facility] at time since last done 7/31/19.  Please add any additional labs you may want.    Staff can call to schedule labs after.

## 2020-07-24 DIAGNOSIS — K21.9 GASTROESOPHAGEAL REFLUX DISEASE, ESOPHAGITIS PRESENCE NOT SPECIFIED: ICD-10-CM

## 2020-07-24 NOTE — TELEPHONE ENCOUNTER
Care Due:                  Date            Visit Type   Department     Provider  --------------------------------------------------------------------------------                                ESTABLISHED                              PATIENT      [facility]  [facility]  Next Visit: None Scheduled  None         None Found                                                            Last  Test          Frequency    Reason                     Performed    Due Date  --------------------------------------------------------------------------------    Office Visit  12 months..  omeprazole, valACYclovir.  07- 07-    Cr..........  12 months..  valACYclovir.............  08- 07-    WBC.........  12 months..  valACYclovir.............  Not Found    Overdue    Powered by [employer] Reference number: [id number]. 7/24/2020 2:00:38 PM CDT

## 2020-07-27 RX ORDER — OMEPRAZOLE 40 MG/1
40 CAPSULE, DELAYED RELEASE ORAL DAILY
Qty: 90 CAPSULE | Refills: 0 | Status: SHIPPED | OUTPATIENT
Start: 2020-07-27 | End: 2020-10-26 | Stop reason: SDUPTHER

## 2020-07-27 NOTE — TELEPHONE ENCOUNTER
Refill Authorization Note    is requesting a refill authorization.    Brief assessment and rationale for refill: APPROVE: needs labs/appt     Medication-related problems identified:   Requires labs  Requires appointment    Medication Therapy Plan: CDMR. NTBS(CMP, LIPIDS, CBC); needs appt(annual)    Medication reconciliation completed: No                         Comments:      Orders Placed This Encounter    omeprazole (PRILOSEC) 40 MG capsule      Requested Prescriptions   Signed Prescriptions Disp Refills    omeprazole (PRILOSEC) 40 MG capsule 90 capsule 0     Sig: Take 1 capsule (40 mg total) by mouth once daily.       Gastroenterology: Proton Pump Inhibitors Failed - 7/24/2020  2:00 PM        Failed - Office visit in past 6 months or future 90 days.     Recent Outpatient Visits            7 months ago Acute bacterial sinusitis    [facility] Destiny Don PA-C    12 months ago Dermatofibroma    [facility] Dermatology Eunice Rodriguez MD    12 months ago Routine medical exam    [facility] Family Medicine Abelardo Burks MD    1 year ago Sinusitis, unspecified chronicity, unspecified location    [facility] Bethany Barron NP    2 years ago Flu-like symptoms    [facility] Sharath Smith MD                    Passed - Patient is at least 18 years old        Passed - Osteoporosis is not on problem list        Passed - Plavix is not on active medication list        Passed - GERD is on problem list            Appointments  past 12m or future 3m with PCP    Date Provider   Last Visit   7/31/2019 Abelardo Burks MD   Next Visit   Visit date not found Abelardo Burks MD   ED visits in past 90 days: [unfilled]     Note composed:10:17 AM 07/27/2020

## 2020-07-27 NOTE — TELEPHONE ENCOUNTER
Provider Staff:     Please schedule patient for Annual and Labs (CMP, LIPIDS, CBC)    Please also check with your provider if any further labs need to be added and scheduled together.    Thanks!  [facility]     Appointments  past 12m or future 3m with PCP    Date Provider   Last Visit   7/31/2019 Abelardo Burks MD   Next Visit   Visit date not found Abelardo Burks MD     Note composed:10:18 AM 07/27/2020

## 2020-09-18 DIAGNOSIS — Z12.39 BREAST CANCER SCREENING: ICD-10-CM

## 2020-09-22 ENCOUNTER — PATIENT OUTREACH (OUTPATIENT)
Dept: ADMINISTRATIVE | Facility: HOSPITAL | Age: 55
End: 2020-09-22

## 2020-09-22 ENCOUNTER — PATIENT MESSAGE (OUTPATIENT)
Dept: ADMINISTRATIVE | Facility: HOSPITAL | Age: 55
End: 2020-09-22

## 2020-09-22 DIAGNOSIS — Z12.11 COLON CANCER SCREENING: Primary | ICD-10-CM

## 2020-09-23 NOTE — TELEPHONE ENCOUNTER
No need to see gastroenterology for colon ca screening.    I signed order for her to have a colonoscopy

## 2020-09-23 NOTE — TELEPHONE ENCOUNTER
Patient is requesting a referral to gastroenterology.    She does not have a preference to any specific provider.

## 2020-09-24 ENCOUNTER — LAB VISIT (OUTPATIENT)
Dept: LAB | Facility: HOSPITAL | Age: 55
End: 2020-09-24
Attending: FAMILY MEDICINE
Payer: COMMERCIAL

## 2020-09-24 DIAGNOSIS — E78.5 HYPERLIPIDEMIA, UNSPECIFIED HYPERLIPIDEMIA TYPE: ICD-10-CM

## 2020-09-24 DIAGNOSIS — B00.1 FEVER BLISTER: ICD-10-CM

## 2020-09-24 LAB
ERYTHROCYTE [DISTWIDTH] IN BLOOD BY AUTOMATED COUNT: 13.2 % (ref 11.5–14.5)
HCT VFR BLD AUTO: 46.8 % (ref 37–48.5)
HGB BLD-MCNC: 15.1 G/DL (ref 12–16)
MCH RBC QN AUTO: 30.6 PG (ref 27–31)
MCHC RBC AUTO-ENTMCNC: 32.3 G/DL (ref 32–36)
MCV RBC AUTO: 95 FL (ref 82–98)
PLATELET # BLD AUTO: 255 K/UL (ref 150–350)
PMV BLD AUTO: 11.3 FL (ref 9.2–12.9)
RBC # BLD AUTO: 4.93 M/UL (ref 4–5.4)
WBC # BLD AUTO: 6.93 K/UL (ref 3.9–12.7)

## 2020-09-24 PROCEDURE — 80061 LIPID PANEL: CPT

## 2020-09-24 PROCEDURE — 36415 COLL VENOUS BLD VENIPUNCTURE: CPT | Mod: PO

## 2020-09-24 PROCEDURE — 85027 COMPLETE CBC AUTOMATED: CPT

## 2020-09-24 PROCEDURE — 80053 COMPREHEN METABOLIC PANEL: CPT

## 2020-09-25 LAB
ALBUMIN SERPL BCP-MCNC: 3.9 G/DL (ref 3.5–5.2)
ALP SERPL-CCNC: 107 U/L (ref 55–135)
ALT SERPL W/O P-5'-P-CCNC: 24 U/L (ref 10–44)
ANION GAP SERPL CALC-SCNC: 10 MMOL/L (ref 8–16)
AST SERPL-CCNC: 21 U/L (ref 10–40)
BILIRUB SERPL-MCNC: 0.5 MG/DL (ref 0.1–1)
BUN SERPL-MCNC: 11 MG/DL (ref 6–20)
CALCIUM SERPL-MCNC: 9.4 MG/DL (ref 8.7–10.5)
CHLORIDE SERPL-SCNC: 103 MMOL/L (ref 95–110)
CHOLEST SERPL-MCNC: 193 MG/DL (ref 120–199)
CHOLEST/HDLC SERPL: 3.4 {RATIO} (ref 2–5)
CO2 SERPL-SCNC: 25 MMOL/L (ref 23–29)
CREAT SERPL-MCNC: 0.8 MG/DL (ref 0.5–1.4)
EST. GFR  (AFRICAN AMERICAN): >60 ML/MIN/1.73 M^2
EST. GFR  (NON AFRICAN AMERICAN): >60 ML/MIN/1.73 M^2
GLUCOSE SERPL-MCNC: 108 MG/DL (ref 70–110)
HDLC SERPL-MCNC: 56 MG/DL (ref 40–75)
HDLC SERPL: 29 % (ref 20–50)
LDLC SERPL CALC-MCNC: 114.4 MG/DL (ref 63–159)
NONHDLC SERPL-MCNC: 137 MG/DL
POTASSIUM SERPL-SCNC: 4.8 MMOL/L (ref 3.5–5.1)
PROT SERPL-MCNC: 7.5 G/DL (ref 6–8.4)
SODIUM SERPL-SCNC: 138 MMOL/L (ref 136–145)
TRIGL SERPL-MCNC: 113 MG/DL (ref 30–150)

## 2020-10-06 ENCOUNTER — OFFICE VISIT (OUTPATIENT)
Dept: FAMILY MEDICINE | Facility: CLINIC | Age: 55
End: 2020-10-06
Payer: COMMERCIAL

## 2020-10-06 ENCOUNTER — HOSPITAL ENCOUNTER (OUTPATIENT)
Dept: RADIOLOGY | Facility: HOSPITAL | Age: 55
Discharge: HOME | End: 2020-10-06
Attending: FAMILY MEDICINE
Payer: COMMERCIAL

## 2020-10-06 VITALS
OXYGEN SATURATION: 95 % | BODY MASS INDEX: 41.67 KG/M2 | TEMPERATURE: 98 F | SYSTOLIC BLOOD PRESSURE: 122 MMHG | HEIGHT: 68 IN | HEART RATE: 80 BPM | DIASTOLIC BLOOD PRESSURE: 84 MMHG | WEIGHT: 274.94 LBS

## 2020-10-06 DIAGNOSIS — R13.10 DYSPHAGIA, UNSPECIFIED TYPE: ICD-10-CM

## 2020-10-06 DIAGNOSIS — Z12.11 COLON CANCER SCREENING: ICD-10-CM

## 2020-10-06 DIAGNOSIS — R06.02 SOB (SHORTNESS OF BREATH) ON EXERTION: ICD-10-CM

## 2020-10-06 DIAGNOSIS — R07.9 CHEST PAIN, UNSPECIFIED TYPE: Primary | ICD-10-CM

## 2020-10-06 PROCEDURE — 93005 EKG 12-LEAD: ICD-10-PCS | Mod: S$GLB,,, | Performed by: FAMILY MEDICINE

## 2020-10-06 PROCEDURE — 90686 FLU VACCINE (QUAD) GREATER THAN OR EQUAL TO 3YO PRESERVATIVE FREE IM: ICD-10-PCS | Mod: S$GLB,,, | Performed by: FAMILY MEDICINE

## 2020-10-06 PROCEDURE — 90471 FLU VACCINE (QUAD) GREATER THAN OR EQUAL TO 3YO PRESERVATIVE FREE IM: ICD-10-PCS | Mod: S$GLB,,, | Performed by: FAMILY MEDICINE

## 2020-10-06 PROCEDURE — 3008F PR BODY MASS INDEX (BMI) DOCUMENTED: ICD-10-PCS | Mod: CPTII,S$GLB,, | Performed by: FAMILY MEDICINE

## 2020-10-06 PROCEDURE — 93010 EKG 12-LEAD: ICD-10-PCS | Mod: S$GLB,,, | Performed by: INTERNAL MEDICINE

## 2020-10-06 PROCEDURE — 93005 ELECTROCARDIOGRAM TRACING: CPT | Mod: S$GLB,,, | Performed by: FAMILY MEDICINE

## 2020-10-06 PROCEDURE — 93010 ELECTROCARDIOGRAM REPORT: CPT | Mod: S$GLB,,, | Performed by: INTERNAL MEDICINE

## 2020-10-06 PROCEDURE — 99214 PR OFFICE/OUTPT VISIT, EST, LEVL IV, 30-39 MIN: ICD-10-PCS | Mod: 25,S$GLB,, | Performed by: FAMILY MEDICINE

## 2020-10-06 PROCEDURE — 90471 IMMUNIZATION ADMIN: CPT | Mod: S$GLB,,, | Performed by: FAMILY MEDICINE

## 2020-10-06 PROCEDURE — 99999 PR PBB SHADOW E&M-EST. PATIENT-LVL V: CPT | Mod: PBBFAC,,, | Performed by: FAMILY MEDICINE

## 2020-10-06 PROCEDURE — 71046 XR CHEST PA AND LATERAL: ICD-10-PCS | Mod: 26,,, | Performed by: RADIOLOGY

## 2020-10-06 PROCEDURE — 71046 X-RAY EXAM CHEST 2 VIEWS: CPT | Mod: TC,FY,PO

## 2020-10-06 PROCEDURE — 3008F BODY MASS INDEX DOCD: CPT | Mod: CPTII,S$GLB,, | Performed by: FAMILY MEDICINE

## 2020-10-06 PROCEDURE — 99214 OFFICE O/P EST MOD 30 MIN: CPT | Mod: 25,S$GLB,, | Performed by: FAMILY MEDICINE

## 2020-10-06 PROCEDURE — 90686 IIV4 VACC NO PRSV 0.5 ML IM: CPT | Mod: S$GLB,,, | Performed by: FAMILY MEDICINE

## 2020-10-06 PROCEDURE — 99999 PR PBB SHADOW E&M-EST. PATIENT-LVL V: ICD-10-PCS | Mod: PBBFAC,,, | Performed by: FAMILY MEDICINE

## 2020-10-06 PROCEDURE — 71046 X-RAY EXAM CHEST 2 VIEWS: CPT | Mod: 26,,, | Performed by: RADIOLOGY

## 2020-10-06 NOTE — PROGRESS NOTES
Subjective:       Patient ID: Funmi Houston is a 55 y.o. female.    Chief Complaint: Results and Gastroesophageal Reflux    HPI     Here for a f/u    Recent labs wnl.    Heavy lower retrosternal chest pressure intermittent x 1 year. Reports intermittent shortness of breath on exertion.     Dysphagia of solids-x 1 year.  Belching helps          Review of Systems      Review of Systems   Constitutional: Negative for fever and chills.   HENT: Negative for hearing loss and neck stiffness.    Eyes: Negative for redness and itching.   Respiratory: Negative for cough and choking.    Cardiovascular: Negative for chest pain and leg swelling.  Abdomen: Negative for abdominal pain and blood in stool.   Genitourinary: Negative for dysuria and flank pain.   Musculoskeletal: Negative for back pain and gait problem.   Neurological: Negative for light-headedness and headaches.   Hematological: Negative for adenopathy.   Psychiatric/Behavioral: Negative for behavioral problems.     Objective:      Physical Exam  Constitutional:       Appearance: She is well-developed.   HENT:      Head: Normocephalic and atraumatic.   Eyes:      Conjunctiva/sclera: Conjunctivae normal.      Pupils: Pupils are equal, round, and reactive to light.   Neck:      Musculoskeletal: Normal range of motion.   Cardiovascular:      Rate and Rhythm: Normal rate and regular rhythm.      Heart sounds: No murmur.   Pulmonary:      Effort: Pulmonary effort is normal.      Breath sounds: Normal breath sounds.   Lymphadenopathy:      Cervical: No cervical adenopathy.         Assessment:       1. Chest pain, unspecified type    2. SOB (shortness of breath) on exertion    3. Dysphagia, unspecified type    4. Colon cancer screening        Plan:       Chest pain, unspecified type  -     EKG 12-lead; Future  -     Nuclear Stress - Cardiology Interpreted; Future    SOB (shortness of breath) on exertion  -     TSH; Future  -     X-Ray Chest PA And Lateral; Future;  Expected date: 10/06/2020  -     Cancel: Complete PFT with bronchodilator; Future  -     Complete PFT with bronchodilator; Future    Dysphagia, unspecified type  -     Case request GI: ESOPHAGOGASTRODUODENOSCOPY (EGD)    Colon cancer screening  -     Case request GI: COLONOSCOPY            Plan:  See orders  [id number]  Walking o2 sat test nml      Medication List with Changes/Refills   Current Medications    AZITHROMYCIN (Z-NASIMA) 250 MG TABLET    Take 2 tablets by mouth on day 1; Take 1 tablet by mouth on days 2-5    DARIFENACIN (ENABLEX) 15 MG 24 HR TABLET    Take 15 mg by mouth once daily.    ESTRADIOL (ESTRACE) 0.5 MG TABLET    Take 0.5 mg by mouth once daily.      FAMOTIDINE (PEPCID) 40 MG TABLET    Take 1 tablet (40 mg total) by mouth nightly as needed for Heartburn.    MULTIVITAMIN (ONE DAILY MULTIVITAMIN) PER TABLET    Take 1 tablet by mouth once daily.    OMEPRAZOLE (PRILOSEC) 40 MG CAPSULE    Take 1 capsule (40 mg total) by mouth once daily.    PROMETHAZINE-DEXTROMETHORPHAN (PROMETHAZINE-DM) 6.25-15 MG/5 ML SYRP    Take 5 mLs by mouth every 6 (six) hours.    SUCRALFATE (CARAFATE) 1 GRAM TABLET    TAKE 1 TABLET (1 G TOTAL) BY MOUTH 4 (FOUR) TIMES DAILY.    VALACYCLOVIR (VALTREX) 1000 MG TABLET    TAKE 1 TABLET (1,000 MG TOTAL) BY MOUTH EVERY 12 (TWELVE) HOURS.

## 2020-10-07 ENCOUNTER — TELEPHONE (OUTPATIENT)
Dept: GASTROENTEROLOGY | Facility: CLINIC | Age: 55
End: 2020-10-07

## 2020-10-07 DIAGNOSIS — Z01.818 PREOP EXAMINATION: ICD-10-CM

## 2020-10-26 ENCOUNTER — TELEPHONE (OUTPATIENT)
Dept: OPHTHALMOLOGY | Facility: CLINIC | Age: 55
End: 2020-10-26

## 2020-10-26 ENCOUNTER — OFFICE VISIT (OUTPATIENT)
Dept: FAMILY MEDICINE | Facility: CLINIC | Age: 55
End: 2020-10-26
Payer: COMMERCIAL

## 2020-10-26 VITALS
OXYGEN SATURATION: 95 % | BODY MASS INDEX: 40.96 KG/M2 | HEART RATE: 94 BPM | SYSTOLIC BLOOD PRESSURE: 124 MMHG | DIASTOLIC BLOOD PRESSURE: 78 MMHG | WEIGHT: 269.38 LBS

## 2020-10-26 DIAGNOSIS — K21.9 GASTROESOPHAGEAL REFLUX DISEASE, UNSPECIFIED WHETHER ESOPHAGITIS PRESENT: ICD-10-CM

## 2020-10-26 DIAGNOSIS — B02.8 HERPES ZOSTER WITH COMPLICATION: Primary | ICD-10-CM

## 2020-10-26 DIAGNOSIS — H57.11 EYE PAIN, RIGHT: ICD-10-CM

## 2020-10-26 DIAGNOSIS — B00.1 FEVER BLISTER: ICD-10-CM

## 2020-10-26 DIAGNOSIS — K21.9 GASTROESOPHAGEAL REFLUX DISEASE: ICD-10-CM

## 2020-10-26 PROCEDURE — 99214 PR OFFICE/OUTPT VISIT, EST, LEVL IV, 30-39 MIN: ICD-10-PCS | Mod: S$GLB,,, | Performed by: FAMILY MEDICINE

## 2020-10-26 PROCEDURE — 99999 PR PBB SHADOW E&M-EST. PATIENT-LVL III: CPT | Mod: PBBFAC,,, | Performed by: FAMILY MEDICINE

## 2020-10-26 PROCEDURE — 99214 OFFICE O/P EST MOD 30 MIN: CPT | Mod: S$GLB,,, | Performed by: FAMILY MEDICINE

## 2020-10-26 PROCEDURE — 99999 PR PBB SHADOW E&M-EST. PATIENT-LVL III: ICD-10-PCS | Mod: PBBFAC,,, | Performed by: FAMILY MEDICINE

## 2020-10-26 PROCEDURE — 3008F PR BODY MASS INDEX (BMI) DOCUMENTED: ICD-10-PCS | Mod: CPTII,S$GLB,, | Performed by: FAMILY MEDICINE

## 2020-10-26 PROCEDURE — 3008F BODY MASS INDEX DOCD: CPT | Mod: CPTII,S$GLB,, | Performed by: FAMILY MEDICINE

## 2020-10-26 RX ORDER — OMEPRAZOLE 40 MG/1
40 CAPSULE, DELAYED RELEASE ORAL DAILY
Qty: 90 CAPSULE | Refills: 3 | Status: SHIPPED | OUTPATIENT
Start: 2020-10-26 | End: 2021-10-20

## 2020-10-26 RX ORDER — FAMOTIDINE 40 MG/1
40 TABLET, FILM COATED ORAL NIGHTLY PRN
Qty: 90 TABLET | Refills: 3 | Status: SHIPPED | OUTPATIENT
Start: 2020-10-26 | End: 2022-01-02

## 2020-10-26 RX ORDER — VALACYCLOVIR HYDROCHLORIDE 1 G/1
1000 TABLET, FILM COATED ORAL 3 TIMES DAILY
Qty: 21 TABLET | Refills: 1 | Status: SHIPPED | OUTPATIENT
Start: 2020-10-26 | End: 2021-04-04

## 2020-10-26 NOTE — PROGRESS NOTES
Subjective:       Patient ID: Funmi Houston is a 55 y.o. female.    Chief Complaint: Eye Problem (possible shingles, itching, feels like something is in eye) and Medication Refill (omprozole)    HPI     C/o right lower eyelid area and right lower eyelid painful rash x 3 days. 1 day before onset, had itchiness below right lower eyelid. Started with blisters which now has dried up.  No photophobia.     Has been taking valtrex since onset of rash for the past 3 days.     gerd stable while on prilosec    Review of Systems      Review of Systems   Constitutional: Negative for fever and chills.   HENT: Negative for hearing loss and neck stiffness.    Eyes: Negative for redness and itching.   Respiratory: Negative for cough and choking.    Cardiovascular: Negative for chest pain and leg swelling.  Abdomen: Negative for abdominal pain and blood in stool.   Genitourinary: Negative for dysuria and flank pain.   Musculoskeletal: Negative for back pain and gait problem.   Neurological: Negative for light-headedness and headaches.   Hematological: Negative for adenopathy.   Psychiatric/Behavioral: Negative for behavioral problems.     Objective:      Physical Exam  Constitutional:       Appearance: She is well-developed.   HENT:      Head: Normocephalic and atraumatic.   Eyes:      Conjunctiva/sclera: Conjunctivae normal.      Pupils: Pupils are equal, round, and reactive to light.   Neck:      Musculoskeletal: Normal range of motion.   Cardiovascular:      Rate and Rhythm: Normal rate and regular rhythm.      Heart sounds: No murmur.   Pulmonary:      Effort: Pulmonary effort is normal.      Breath sounds: Normal breath sounds.   Lymphadenopathy:      Cervical: No cervical adenopathy.   Skin:     Comments: Crusted small vesicular lesion on right lower eyelid and central lower eyelid last         Assessment:       1. Herpes zoster with complication    2. Fever blister    3. Gastroesophageal reflux disease    4. Eye pain, right         Plan:       Herpes zoster with complication    Fever blister  -     valACYclovir (VALTREX) 1000 MG tablet; Take 1 tablet (1,000 mg total) by mouth 3 (three) times daily. for 7 days  Dispense: 21 tablet; Refill: 1    Gastroesophageal reflux disease    Eye pain, right  -     Ambulatory referral/consult to Optometry; Future; Expected date: 11/02/2020    Other orders  -     omeprazole (PRILOSEC) 40 MG capsule; Take 1 capsule (40 mg total) by mouth once daily.  Dispense: 90 capsule; Refill: 3                Plan:  New rx of valtrex  See referral  Cont all other meds      Medication List with Changes/Refills   Current Medications    AZITHROMYCIN (Z-NASIMA) 250 MG TABLET    Take 2 tablets by mouth on day 1; Take 1 tablet by mouth on days 2-5    DARIFENACIN (ENABLEX) 15 MG 24 HR TABLET    Take 15 mg by mouth once daily.    ESTRADIOL (ESTRACE) 0.5 MG TABLET    Take 0.5 mg by mouth once daily.      MULTIVITAMIN (ONE DAILY MULTIVITAMIN) PER TABLET    Take 1 tablet by mouth once daily.    PROMETHAZINE-DEXTROMETHORPHAN (PROMETHAZINE-DM) 6.25-15 MG/5 ML SYRP    Take 5 mLs by mouth every 6 (six) hours.    SUCRALFATE (CARAFATE) 1 GRAM TABLET    TAKE 1 TABLET (1 G TOTAL) BY MOUTH 4 (FOUR) TIMES DAILY.   Changed and/or Refilled Medications    Modified Medication Previous Medication    FAMOTIDINE (PEPCID) 40 MG TABLET famotidine (PEPCID) 40 MG tablet       Take 1 tablet (40 mg total) by mouth nightly as needed for Heartburn.    Take 1 tablet (40 mg total) by mouth nightly as needed for Heartburn.    OMEPRAZOLE (PRILOSEC) 40 MG CAPSULE omeprazole (PRILOSEC) 40 MG capsule       Take 1 capsule (40 mg total) by mouth once daily.    Take 1 capsule (40 mg total) by mouth once daily.    VALACYCLOVIR (VALTREX) 1000 MG TABLET valACYclovir (VALTREX) 1000 MG tablet       Take 1 tablet (1,000 mg total) by mouth 3 (three) times daily. for 7 days    TAKE 1 TABLET (1,000 MG TOTAL) BY MOUTH EVERY 12 (TWELVE) HOURS.

## 2020-10-27 ENCOUNTER — TELEPHONE (OUTPATIENT)
Dept: OPHTHALMOLOGY | Facility: CLINIC | Age: 55
End: 2020-10-27

## 2020-10-30 ENCOUNTER — PATIENT MESSAGE (OUTPATIENT)
Dept: ADMINISTRATIVE | Facility: HOSPITAL | Age: 55
End: 2020-10-30

## 2020-11-02 ENCOUNTER — HOSPITAL ENCOUNTER (OUTPATIENT)
Dept: RADIOLOGY | Facility: HOSPITAL | Age: 55
Discharge: HOME | End: 2020-11-02
Attending: FAMILY MEDICINE
Payer: COMMERCIAL

## 2020-11-02 ENCOUNTER — CLINICAL SUPPORT (OUTPATIENT)
Dept: CARDIOLOGY | Facility: CLINIC | Age: 55
End: 2020-11-02
Attending: FAMILY MEDICINE
Payer: COMMERCIAL

## 2020-11-02 VITALS — WEIGHT: 269 LBS | BODY MASS INDEX: 40.77 KG/M2 | HEIGHT: 68 IN

## 2020-11-02 DIAGNOSIS — R07.9 CHEST PAIN, UNSPECIFIED TYPE: ICD-10-CM

## 2020-11-02 PROCEDURE — 93018 PR CARDIAC STRESS TST,INTERP/REPT ONLY: ICD-10-PCS | Mod: ,,, | Performed by: INTERNAL MEDICINE

## 2020-11-02 PROCEDURE — 93016 STRESS TEST WITH MYOCARDIAL PERFUSION (CUPID ONLY): ICD-10-PCS | Mod: ,,, | Performed by: INTERNAL MEDICINE

## 2020-11-02 PROCEDURE — 78452 STRESS TEST WITH MYOCARDIAL PERFUSION (CUPID ONLY): ICD-10-PCS | Mod: 26,,, | Performed by: INTERNAL MEDICINE

## 2020-11-02 PROCEDURE — 99999 PR PBB SHADOW E&M-EST. PATIENT-LVL I: ICD-10-PCS | Mod: PBBFAC,,,

## 2020-11-02 PROCEDURE — 93017 CV STRESS TEST TRACING ONLY: CPT | Mod: PO

## 2020-11-02 PROCEDURE — 78452 HT MUSCLE IMAGE SPECT MULT: CPT | Mod: 26,,, | Performed by: INTERNAL MEDICINE

## 2020-11-02 PROCEDURE — 93018 CV STRESS TEST I&R ONLY: CPT | Mod: ,,, | Performed by: INTERNAL MEDICINE

## 2020-11-02 PROCEDURE — 93016 CV STRESS TEST SUPVJ ONLY: CPT | Mod: ,,, | Performed by: INTERNAL MEDICINE

## 2020-11-02 PROCEDURE — 99999 PR PBB SHADOW E&M-EST. PATIENT-LVL I: CPT | Mod: PBBFAC,,,

## 2020-11-03 LAB
CV STRESS BASE HR: 69 BPM
DIASTOLIC BLOOD PRESSURE: 67 MMHG
NUC REST EJECTION FRACTION: 63
OHS CV CPX 1 MINUTE RECOVERY HEART RATE: 113 BPM
OHS CV CPX 85 PERCENT MAX PREDICTED HEART RATE MALE: 133
OHS CV CPX MAX PREDICTED HEART RATE: 157
OHS CV CPX PATIENT IS FEMALE: 1
OHS CV CPX PATIENT IS MALE: 0
OHS CV CPX PEAK DIASTOLIC BLOOD PRESSURE: 67 MMHG
OHS CV CPX PEAK HEAR RATE: 1116 BPM
OHS CV CPX PEAK RATE PRESSURE PRODUCT: NORMAL
OHS CV CPX PEAK SYSTOLIC BLOOD PRESSURE: 136 MMHG
OHS CV CPX PERCENT MAX PREDICTED HEART RATE ACHIEVED: 712
OHS CV CPX RATE PRESSURE PRODUCT PRESENTING: 9384
SYSTOLIC BLOOD PRESSURE: 136 MMHG

## 2020-11-09 ENCOUNTER — TELEPHONE (OUTPATIENT)
Dept: FAMILY MEDICINE | Facility: CLINIC | Age: 55
End: 2020-11-09

## 2020-11-10 ENCOUNTER — PATIENT OUTREACH (OUTPATIENT)
Dept: ADMINISTRATIVE | Facility: HOSPITAL | Age: 55
End: 2020-11-10

## 2020-11-10 NOTE — PROGRESS NOTES
Non-compliant GAP report chart review - Chart review completed for the following HM test if overdue  (Mammogram, Colonoscopy, Cervical Cancer Screening,  Diabetic lab testing, and/or Dilated EYE EXAM)  11/10/2020    Care Everywhere and Media reports - updates requested and reviewed.            DIS reviewed for test needed.  Mammogram            Health Maintenance Due   Topic Date Due    TETANUS VACCINE  08/23/1982    Shingles Vaccine (1 of 2) 08/23/2014    Colorectal Cancer Screening  08/23/2018    Mammogram  10/03/2019

## 2020-11-27 ENCOUNTER — LAB VISIT (OUTPATIENT)
Dept: FAMILY MEDICINE | Facility: CLINIC | Age: 55
End: 2020-11-27
Payer: COMMERCIAL

## 2020-11-27 DIAGNOSIS — Z01.818 PREOP EXAMINATION: ICD-10-CM

## 2020-11-27 PROCEDURE — U0003 INFECTIOUS AGENT DETECTION BY NUCLEIC ACID (DNA OR RNA); SEVERE ACUTE RESPIRATORY SYNDROME CORONAVIRUS 2 (SARS-COV-2) (CORONAVIRUS DISEASE [COVID-19]), AMPLIFIED PROBE TECHNIQUE, MAKING USE OF HIGH THROUGHPUT TECHNOLOGIES AS DESCRIBED BY CMS-2020-01-R: HCPCS

## 2020-11-28 LAB — SARS-COV-2 RNA RESP QL NAA+PROBE: NOT DETECTED

## 2020-11-29 NOTE — H&P
"History & Physical - Short Stay  Gastroenterology      SUBJECTIVE:     Procedure: Gastroscopy and Colonoscopy    Chief Complaint/Indication for Procedure: Reflux.  Dysphagia.  Colon cancer screening.      History of Present Illness:  Office Visit    10/6/2020  [facility]     Abelardo Burks MD  Family Medicine  Chest pain, unspecified type +3 more  Dx  Results , Gastroesophageal Reflux  Reason for Visit      Progress Notes  Abelardo Burks MD (Physician)   Family Medicine   10/6/2020  8:00 AM   Signed     Subjective:       Patient ID: Funmi Houston is a 55 y.o. female.     Chief Complaint: Results and Gastroesophageal Reflux     HPI      Here for a f/u     Recent labs wnl.     Heavy lower retrosternal chest pressure intermittent x 1 year. Reports intermittent shortness of breath on exertion.      Dysphagia of solids-x 1 year.  Belching helps     Assessment:       1. Chest pain, unspecified type    2. SOB (shortness of breath) on exertion    3. Dysphagia, unspecified type    4. Colon cancer screening        Plan:       Chest pain, unspecified type  -     EKG 12-lead; Future  -     Nuclear Stress - Cardiology Interpreted; Future     SOB (shortness of breath) on exertion  -     TSH; Future  -     X-Ray Chest PA And Lateral; Future; Expected date: 10/06/2020  -     Cancel: Complete PFT with bronchodilator; Future  -     Complete PFT with bronchodilator; Future     Dysphagia, unspecified type  -     Case request GI: ESOPHAGOGASTRODUODENOSCOPY (EGD)     Colon cancer screening  -     Case request GI: COLONOSCOPY               See last Upper GI endoscopy 6/24/2014   Indications:         Epigastric abdominal pain, Dysphagia, Heartburn, Suspected esophageal reflux   Abnormal motility (spasms) was noted in the middle third of the        esophagus, a twitchy esophagus" was noted.        Slight erythema suggesting reflux esophagitis was found in the        distal esopahgus.        The esophagus was " otherwise normal.        The Z-line was regular and was found 37 cm from the incisors.        Some laxness of the lower esophageal sphincter was noted, but I did not see a definite hiatal hernia.  Impression:          - Normal oropharynx.                        - Reflux esophagitis.                        - Spasm of the esophagus.                        - Z-line regular, 37 cm from the incisors.                        - Normal gastric fundus, gastric body and antrum.                        - Gastric ulcers with clean base. Biopsied.                        - Normal examined duodenum.   Recommendation:      - Discharge patient to home.                        - Await pathology results.                        - Follow an antireflux regimen.                        - Continue present medications.                        - Use Prilosec (omeprazole) 40 mg PO daily.                        - Add Zantac (ranitidine) 300 mg PO nightly, for 1-2                        months.                        - Use Levsin, NuLev (hyoscyamine) 0.125 mg 1-2 tabs                        SL q 4 hours PRN.                        - Call the G.I. clinic in 2 weeks for reports (if                        you haven't heard from us sooner) [phone].                        - Return to GI clinic in 6-8 weeks.   Teja Rios MD   6/24/2014         PTA Medications   Medication Sig    darifenacin (ENABLEX) 15 mg 24 hr tablet Take 15 mg by mouth once daily.    estradiol (ESTRACE) 0.5 MG tablet Take 0.5 mg by mouth once daily.      famotidine (PEPCID) 40 MG tablet Take 1 tablet (40 mg total) by mouth nightly as needed for Heartburn.    multivitamin (ONE DAILY MULTIVITAMIN) per tablet Take 1 tablet by mouth once daily.    omeprazole (PRILOSEC) 40 MG capsule Take 1 capsule (40 mg total) by mouth once daily.    valACYclovir (VALTREX) 1000 MG tablet Take 1 tablet (1,000 mg total) by mouth 3 (three) times daily. for 7 days       Review of patient's  "allergies indicates:   Allergen Reactions    Penicillins Hives and Rash        Past Medical History:   Diagnosis Date    Edema     History of endometriosis     Restless leg syndrome      Past Surgical History:   Procedure Laterality Date    CHOLECYSTECTOMY      ESOPHAGOGASTRODUODENOSCOPY      TONSILLECTOMY      TOTAL VAGINAL HYSTERECTOMY       Family History   Problem Relation Age of Onset    Macular degeneration Other     Diabetes Mother     Kidney disease Mother     Stroke Mother     Heart disease Mother     Diabetes Father     Stroke Father     Heart disease Father     Macular degeneration Father     Glaucoma Father     Diabetes Brother     Heart disease Brother     Macular degeneration Brother     Macular degeneration Sister     Macular degeneration Brother      Social History     Tobacco Use    Smoking status: Never Smoker    Smokeless tobacco: Never Used   Substance Use Topics    Alcohol use: Yes     Frequency: Monthly or less     Drinks per session: 1 or 2     Binge frequency: Never     Comment: seldom    Drug use: No         OBJECTIVE:     Vital Signs (Most Recent)  Temp: 98.1 °F (36.7 °C) (11/30/20 1257)  Pulse: 65 (11/30/20 1257)  Resp: 16 (11/30/20 1257)  BP: (!) 161/77 (11/30/20 1257)  SpO2: 100 % (11/30/20 1257)    Physical Exam:  : Ht: 5' 8" (172.7 cm)   Wt: 124.3 kg (274 lb)   BMI: 41.66 kg/m²                                                        GENERAL:  Comfortable, in no acute distress.                                 HEENT EXAM:  Nonicteric.  No adenopathy.  Oropharynx is clear.               NECK:  Supple.                                                               LUNGS:  Clear.                                                               CARDIAC:  Regular rate and rhythm.  S1, S2.  No murmur.                      ABDOMEN:  Obese.  Soft, positive bowel sounds, nontender.  No hepatosplenomegaly or masses.  No rebound or guarding.                                     "         EXTREMITIES:  No edema.     MENTAL STATUS:  Alert and oriented.    ASSESSMENT/PLAN:     Assessment: Reflux.  Dysphagia.  Colon cancer screening.    Plan: Gastroscopy and Colonoscopy    Anesthesia Plan:   MAC / General Anaesthesia    ASA Grade: ASA 2 - Patient with mild systemic disease with no functional limitations    MALLAMPATI SCORE: II (hard and soft palate, upper portion of tonsils anduvula visible)

## 2020-11-30 ENCOUNTER — ANESTHESIA (OUTPATIENT)
Dept: ENDOSCOPY | Facility: HOSPITAL | Age: 55
End: 2020-11-30
Payer: COMMERCIAL

## 2020-11-30 ENCOUNTER — HOSPITAL ENCOUNTER (OUTPATIENT)
Facility: HOSPITAL | Age: 55
Discharge: HOME | End: 2020-11-30
Attending: INTERNAL MEDICINE | Admitting: INTERNAL MEDICINE
Payer: COMMERCIAL

## 2020-11-30 ENCOUNTER — ANESTHESIA EVENT (OUTPATIENT)
Dept: ENDOSCOPY | Facility: HOSPITAL | Age: 55
End: 2020-11-30
Payer: COMMERCIAL

## 2020-11-30 DIAGNOSIS — R13.10 DYSPHAGIA: ICD-10-CM

## 2020-11-30 LAB — H PYLORI INDEX VALUE: NEGATIVE

## 2020-11-30 PROCEDURE — 37000009 HC ANESTHESIA EA ADD 15 MINS: Mod: PO | Performed by: INTERNAL MEDICINE

## 2020-11-30 PROCEDURE — 43248 EGD GUIDE WIRE INSERTION: CPT | Mod: PO | Performed by: INTERNAL MEDICINE

## 2020-11-30 PROCEDURE — 43239 EGD BIOPSY SINGLE/MULTIPLE: CPT | Mod: PO | Performed by: INTERNAL MEDICINE

## 2020-11-30 PROCEDURE — 43248 PR EGD, FLEX, W/DILATION OVER GUIDEWIRE: ICD-10-PCS | Mod: 51,,, | Performed by: INTERNAL MEDICINE

## 2020-11-30 PROCEDURE — D9220A PRA ANESTHESIA: Mod: 33,CRNA,, | Performed by: NURSE ANESTHETIST, CERTIFIED REGISTERED

## 2020-11-30 PROCEDURE — 43239 EGD BIOPSY SINGLE/MULTIPLE: CPT | Mod: 59,,, | Performed by: INTERNAL MEDICINE

## 2020-11-30 PROCEDURE — 37000008 HC ANESTHESIA 1ST 15 MINUTES: Mod: PO | Performed by: INTERNAL MEDICINE

## 2020-11-30 PROCEDURE — 43248 EGD GUIDE WIRE INSERTION: CPT | Mod: 51,,, | Performed by: INTERNAL MEDICINE

## 2020-11-30 PROCEDURE — 25000003 PHARM REV CODE 250: Mod: PO | Performed by: INTERNAL MEDICINE

## 2020-11-30 PROCEDURE — 63600175 PHARM REV CODE 636 W HCPCS: Mod: PO | Performed by: NURSE ANESTHETIST, CERTIFIED REGISTERED

## 2020-11-30 PROCEDURE — 45385 COLONOSCOPY W/LESION REMOVAL: CPT | Mod: PO | Performed by: INTERNAL MEDICINE

## 2020-11-30 PROCEDURE — 27201089 HC SNARE, DISP (ANY): Mod: PO | Performed by: INTERNAL MEDICINE

## 2020-11-30 PROCEDURE — 43239 PR EGD, FLEX, W/BIOPSY, SGL/MULTI: ICD-10-PCS | Mod: 59,,, | Performed by: INTERNAL MEDICINE

## 2020-11-30 PROCEDURE — 87449 NOS EACH ORGANISM AG IA: CPT | Mod: PO | Performed by: INTERNAL MEDICINE

## 2020-11-30 PROCEDURE — D9220A PRA ANESTHESIA: ICD-10-PCS | Mod: 33,CRNA,, | Performed by: NURSE ANESTHETIST, CERTIFIED REGISTERED

## 2020-11-30 PROCEDURE — 88305 TISSUE EXAM BY PATHOLOGIST: CPT | Mod: 26,,, | Performed by: PATHOLOGY

## 2020-11-30 PROCEDURE — D9220A PRA ANESTHESIA: ICD-10-PCS | Mod: 33,ANES,, | Performed by: ANESTHESIOLOGY

## 2020-11-30 PROCEDURE — 63600175 PHARM REV CODE 636 W HCPCS: Mod: PO | Performed by: INTERNAL MEDICINE

## 2020-11-30 PROCEDURE — 88305 TISSUE EXAM BY PATHOLOGIST: CPT | Performed by: PATHOLOGY

## 2020-11-30 PROCEDURE — D9220A PRA ANESTHESIA: Mod: 33,ANES,, | Performed by: ANESTHESIOLOGY

## 2020-11-30 PROCEDURE — 25000003 PHARM REV CODE 250: Mod: PO | Performed by: NURSE ANESTHETIST, CERTIFIED REGISTERED

## 2020-11-30 PROCEDURE — 45385 PR COLONOSCOPY,REMV LESN,SNARE: ICD-10-PCS | Mod: 33,,, | Performed by: INTERNAL MEDICINE

## 2020-11-30 PROCEDURE — 88305 TISSUE EXAM BY PATHOLOGIST: ICD-10-PCS | Mod: 26,,, | Performed by: PATHOLOGY

## 2020-11-30 PROCEDURE — 45385 COLONOSCOPY W/LESION REMOVAL: CPT | Mod: 33,,, | Performed by: INTERNAL MEDICINE

## 2020-11-30 PROCEDURE — 27201012 HC FORCEPS, HOT/COLD, DISP: Mod: PO | Performed by: INTERNAL MEDICINE

## 2020-11-30 RX ORDER — PROPOFOL 10 MG/ML
VIAL (ML) INTRAVENOUS
Status: DISCONTINUED | OUTPATIENT
Start: 2020-11-30 | End: 2020-11-30

## 2020-11-30 RX ORDER — LIDOCAINE HCL/PF 100 MG/5ML
SYRINGE (ML) INTRAVENOUS
Status: DISCONTINUED | OUTPATIENT
Start: 2020-11-30 | End: 2020-11-30

## 2020-11-30 RX ORDER — LIDOCAINE HYDROCHLORIDE 10 MG/ML
1 INJECTION INFILTRATION; PERINEURAL ONCE
Status: COMPLETED | OUTPATIENT
Start: 2020-11-30 | End: 2020-11-30

## 2020-11-30 RX ORDER — SODIUM CHLORIDE 0.9 % (FLUSH) 0.9 %
10 SYRINGE (ML) INJECTION
Status: DISCONTINUED | OUTPATIENT
Start: 2020-11-30 | End: 2020-11-30 | Stop reason: HOSPADM

## 2020-11-30 RX ORDER — SODIUM CHLORIDE, SODIUM LACTATE, POTASSIUM CHLORIDE, CALCIUM CHLORIDE 600; 310; 30; 20 MG/100ML; MG/100ML; MG/100ML; MG/100ML
INJECTION, SOLUTION INTRAVENOUS CONTINUOUS
Status: DISCONTINUED | OUTPATIENT
Start: 2020-11-30 | End: 2020-11-30 | Stop reason: HOSPADM

## 2020-11-30 RX ADMIN — PROPOFOL 50 MG: 10 INJECTION, EMULSION INTRAVENOUS at 01:11

## 2020-11-30 RX ADMIN — SODIUM CHLORIDE, SODIUM LACTATE, POTASSIUM CHLORIDE, AND CALCIUM CHLORIDE: .6; .31; .03; .02 INJECTION, SOLUTION INTRAVENOUS at 01:11

## 2020-11-30 RX ADMIN — PROPOFOL 50 MG: 10 INJECTION, EMULSION INTRAVENOUS at 02:11

## 2020-11-30 RX ADMIN — PROPOFOL 140 MG: 10 INJECTION, EMULSION INTRAVENOUS at 01:11

## 2020-11-30 RX ADMIN — LIDOCAINE HYDROCHLORIDE 75 MG: 20 INJECTION, SOLUTION INTRAVENOUS at 01:11

## 2020-11-30 RX ADMIN — LIDOCAINE HYDROCHLORIDE 1 ML: 10 INJECTION, SOLUTION EPIDURAL; INFILTRATION; INTRACAUDAL; PERINEURAL at 01:11

## 2020-11-30 NOTE — PLAN OF CARE
SEE EPIC DETAILS FURTHER FOR PACU MONITORING POST PROCEDURE.      CARE ASSUMED FROM [facility].  PT SLEEPY BUT OTHERWISE IN NAD    CALL PLACED TO  TO COME TO RECOVERY TO SPEAK WITH DR TONY

## 2020-11-30 NOTE — ANESTHESIA POSTPROCEDURE EVALUATION
Anesthesia Post Evaluation    Patient: Funmi Houston    Procedure(s) Performed: Procedure(s) (LRB):  ESOPHAGOGASTRODUODENOSCOPY (EGD) (N/A)  COLONOSCOPY (N/A)    Final Anesthesia Type: general    Patient location during evaluation: PACU  Patient participation: Yes- Able to Participate  Level of consciousness: sedated and awake  Post-procedure vital signs: reviewed and stable  Pain management: adequate  Airway patency: patent    PONV status at discharge: No PONV  Anesthetic complications: no      Cardiovascular status: blood pressure returned to baseline  Respiratory status: spontaneous ventilation  Hydration status: euvolemic  Follow-up not needed.          Vitals Value Taken Time   /60 11/30/20 1425   Temp 36.4 °C (97.5 °F) 11/30/20 1425   Pulse 75 11/30/20 1425   Resp 16 11/30/20 1425   SpO2 100 % 11/30/20 1425         No case tracking events are documented in the log.      Pain/Juan Score: Juan Score: 9 (11/30/2020  2:25 PM)

## 2020-11-30 NOTE — DISCHARGE INSTRUCTIONS
Recovery After Procedural Sedation (Adult)   You have been given medicine by vein to make you sleep during your surgery. This may have included both a pain medicine and sleeping medicine. Most of the effects have worn off. But you may still have some drowsiness for the next 6 to 8 hours.  Home care  Follow these guidelines when you get home:  · For the next 8 hours, you should be watched by a responsible adult. This person should make sure your condition is not getting worse.  · Don't drink any alcohol for the next 24 hours.  · Don't drive, operate dangerous machinery, or make important business or personal decisions during the next 24 hours.  · To prevent injury or falls, use caution when standing and walking for at least 24 hours after your procedure.  Note: Your healthcare provider may tell you not to take any medicine by mouth for pain or sleep in the next 4 hours. These medicines may react with the medicines you were given in the hospital. This could cause a much stronger response than usual.  Follow-up care  Follow up with your healthcare provider if you are not alert and back to your usual level of activity within 12 hours.  When to seek medical advice  Call your healthcare provider right away if any of these occur:  · Drowsiness gets worse  · Weakness or dizziness gets worse  · Repeated vomiting  · You can't be awakened  · Fever  · New rash  [employer] last reviewed this educational content on 9/1/2019  © 9562-3774 [employer], [employer]. [address] [place] [address]. All rights reserved. This information is not intended as a substitute for professional medical care. Always follow your healthcare professional's instructions.        Tips to Control Acid Reflux    To control acid reflux, youll need to make some basic diet and lifestyle changes. The simple steps outlined below may be all youll need to ease discomfort.  Watch what you eat  · Avoid fatty foods and spicy foods.  · Eat fewer  acidic foods, such as citrus and tomato-based foods. These can increase symptoms.  · Limit drinking alcohol, caffeine, and fizzy beverages. All increase acid reflux.  · Try limiting chocolate, peppermint, and spearmint. These can worsen acid reflux in some people.  Watch when you eat  · Avoid lying down for 3 hours after eating.  · Do not snack before going to bed.  Raise your head  Raising your head and upper body by 4 to 6 inches helps limit reflux when youre lying down. Put blocks under the head of your bed frame to raise it.  Other changes  · Lose weight, if you need to  · Dont exercise near bedtime  · Avoid tight-fitting clothes  · Limit aspirin and ibuprofen  · Stop smoking   Date Last Reviewed: 7/1/2016 © 2000-2017 [employer]. [address] [place] [address], [place] [address]. All rights reserved. This information is not intended as a substitute for professional medical care. Always follow your healthcare professional's instructions.        High-Fiber Diet  Fiber is in fruits, vegetables, cereals, and grains. Fiber passes through your body undigested. A high-fiber diet helps food move through your intestinal tract. The added bulk is helpful in preventing constipation. In people with diverticulosis, fiber helps clean out the pouches along the colon wall. It also prevents new pouches from forming. A high-fiber diet reduces the risk of colon cancer. It also lowers blood cholesterol and prevents high blood sugar in people with diabetes.    The fiber-rich foods listed below should be part of your diet. If you are not used to high-fiber foods, start with 1 or 2 foods from this list. Every 3 to 4 days add a new one to your diet. Do this until you are eating 4 high-fiber foods per day. This should give you 20 to 35 grams of fiber a day. It is also important to drink a lot of water when you are on this diet. You should have 6 to 8 glasses of water a day. Water makes the fiber swell and increases the  benefit.  Foods high in dietary fiber  The following foods are high in dietary fiber:  · Breads. Breads made with 100% whole-wheat flour; bella, wheat, or rye crackers; whole-grain tortillas, bran muffins.  · Cereals. Whole-grain and bran cereals with bran (shredded wheat, wheat flakes, raisin bran, corn bran); oatmeal, rolled oats, granola, and brown rice.  · Fruits. Fresh fruits and their edible skins (pears, prunes, raisins, berries, apples, and apricots); bananas, citrus fruit, mangoes, pineapple; and prune juice.  · Nuts. Any nuts and seeds.  · Vegetables. Best served raw or lightly cooked. All types, especially: green peas, celery, eggplant, potatoes, spinach, broccoli, [place] sprouts, winter squash, carrots, cauliflower, soybeans, lentils, and fresh and dried beans of all kinds.  · Other. Popcorn, any spices.  Date Last Reviewed: 8/1/2016  © 0959-2473 [employer]. [address] [place] [address], [place] [address]. All rights reserved. This information is not intended as a substitute for professional medical care. Always follow your healthcare professional's instructions.

## 2020-11-30 NOTE — PROVATION PATIENT INSTRUCTIONS
Discharge Summary/Instructions after an Endoscopic Procedure  Patient Name: Funmi Houston  Patient MRN: [id number]  Patient YOB: 1965 Monday, November 30, 2020  Teja Rios MD  RESTRICTIONS:  During your procedure today, you received medications for sedation.  These   medications may affect your judgment, balance and coordination.  Therefore,   for 24 hours, you have the following restrictions:   - DO NOT drive a car, operate machinery, make legal/financial decisions,   sign important papers or drink alcohol.    ACTIVITY:  Today: no heavy lifting, straining or running due to procedural   sedation/anesthesia.  The following day: return to full activity including work.  DIET:  Eat and drink normally unless instructed otherwise.     TREATMENT FOR COMMON SIDE EFFECTS:  - Mild abdominal pain, nausea, belching, bloating or excessive gas:  rest,   eat lightly and use a heating pad.  - Sore Throat: treat with throat lozenges and/or gargle with warm salt   water.  - Because air was used during the procedure, expelling large amounts of air   from your rectum or belching is normal.  - If a bowel prep was taken, you may not have a bowel movement for 1-3 days.    This is normal.  SYMPTOMS TO WATCH FOR AND REPORT TO YOUR PHYSICIAN:  1. Abdominal pain or bloating, other than gas cramps.  2. Chest pain.  3. Back pain.  4. Signs of infection such as: chills or fever occurring within 24 hours   after the procedure.  5. Rectal bleeding, which would show as bright red, maroon, or black stools.   (A tablespoon of blood from the rectum is not serious, especially if   hemorrhoids are present.)  6. Vomiting.  7. Weakness or dizziness.  GO DIRECTLY TO THE NEAREST EMERGENCY ROOM IF YOU HAVE ANY OF THE FOLLOWING:      Difficulty breathing              Chills and/or fever over 101 F   Persistent vomiting and/or vomiting blood   Severe abdominal pain   Severe chest pain   Black, tarry stools   Bleeding- more than one  tablespoon   Any other symptom or condition that you feel may need urgent attention  Your doctor recommends these additional instructions:  If any biopsies were taken, your doctors clinic will contact you in 1 to 2   weeks with any results.  Follow an antireflux regimen.  This includes:       - Do not lie down for at least 3 to 4 hours after meals.        - Raise the head of the bed 4 to 6 inches.        - Decrease excess weight.        - Avoid citrus juices and other acidic foods, alcohol, chocolate, mints,   coffee and other caffeinated beverages, carbonated beverages, fatty and   fried foods.        - Avoid tight-fitting clothing.        - Avoid cigarettes and other tobacco products.   Continue your present medications.   Take Prilosec (omeprazole) 40 mg by mouth once a day.   Take Pepcid (famotidine) 40 mg by mouth every night.   Return to GI clinic in 6-8 weeks.   For questions, problems or results please call your physician - Teja Rios MD at Work:  [phone].  EMERGENCY PHONE NUMBER: [phone], LAB RESULTS: [phone]  IF A COMPLICATION OR EMERGENCY SITUATION ARISES AND YOU ARE UNABLE TO REACH   YOUR PHYSICIAN - GO DIRECTLY TO THE EMERGENCY ROOM.  ___________________________________________  Nurse Signature  ___________________________________________  Patient/Designated Responsible Party Signature  Teja Rios MD  11/30/2020 2:46:21 PM  This report has been verified and signed electronically.  [employer]

## 2020-11-30 NOTE — ANESTHESIA PREPROCEDURE EVALUATION
11/30/2020  Funmi Houston is a 55 y.o., female.    Anesthesia Evaluation    I have reviewed the Patient Summary Reports.    I have reviewed the Nursing Notes. I have reviewed the NPO Status.   I have reviewed the Medications.     Review of Systems  Cardiovascular:  Cardiovascular Normal     Pulmonary:  Pulmonary Normal    Renal/:   Bladder incontinence issue   Hepatic/GI:   Bowel Prep. GERD    Musculoskeletal:  Musculoskeletal Normal    Neurological:  Neurology Normal    Endocrine:  Endocrine Normal    Psych:  Psychiatric Normal           Physical Exam  General:  Morbid Obesity    Airway/Jaw/Neck:  Airway Findings: Mouth Opening: Normal Tongue: Normal  General Airway Assessment: Adult  Mallampati: IV  Improves to III with phonation.      Dental:  Dental Findings: In tact   Chest/Lungs:  Chest/Lungs Findings: Clear to auscultation, Normal Respiratory Rate         Mental Status:  Mental Status Findings:  Cooperative, Alert and Oriented         Anesthesia Plan  Type of Anesthesia, risks & benefits discussed:  Anesthesia Type:  general  Patient's Preference:   Intra-op Monitoring Plan: standard ASA monitors  Intra-op Monitoring Plan Comments:   Post Op Pain Control Plan:   Post Op Pain Control Plan Comments:   Induction:   IV  Beta Blocker:  Patient is not currently on a Beta-Blocker (No further documentation required).       Informed Consent: Patient understands risks and agrees with Anesthesia plan.  Questions answered. Anesthesia consent signed with patient.  ASA Score: 3     Day of Surgery Review of History & Physical:            Ready For Surgery From Anesthesia Perspective.

## 2020-11-30 NOTE — TRANSFER OF CARE
"Anesthesia Transfer of Care Note    Patient: Funmi Houston    Procedure(s) Performed: Procedure(s) (LRB):  ESOPHAGOGASTRODUODENOSCOPY (EGD) (N/A)  COLONOSCOPY (N/A)    Patient location: PACU    Anesthesia Type: general    Transport from OR: Transported from OR on room air with adequate spontaneous ventilation    Post pain: adequate analgesia    Post assessment: no apparent anesthetic complications    Post vital signs: stable    Level of consciousness: awake and sedated    Nausea/Vomiting: no nausea/vomiting    Complications: none    Transfer of care protocol was followed      Last vitals:   Visit Vitals  /60 (BP Location: Left arm, Patient Position: Lying)   Pulse 75   Temp 36.4 °C (97.5 °F) (Skin)   Resp 16   Ht 5' 8" (1.727 m)   Wt 124.3 kg (274 lb)   SpO2 100%   Breastfeeding No   BMI 41.66 kg/m²     "

## 2020-11-30 NOTE — PROVATION PATIENT INSTRUCTIONS
Discharge Summary/Instructions for after Colonoscopy with   Biopsy/Polypectomy  Funmi Houston    Monday, November 30, 2020  Teja Rios MD  RESTRICTIONS ON ACTIVITY:  - Do not drive a car or operate machinery until the day after the procedure.      - The following day: return to full activity including work.  - For  3 days: No heavy lifting, straining or running.  - Diet: You can have solid foods, but no gassy foods (i.e. beans, broccoli,   cabbage, etc).  TREATMENT FOR COMMON SIDE EFFECTS:  - Mild abdominal pain and bloating or excessive gas: rest, eat lightly and   use a heating pad.  SYMPTOMS TO WATCH FOR AND REPORT TO YOUR PHYSICIAN:  1. Severe abdominal pain.  2. Fever within 24 hours after a procedure.  3. A large amount of rectal bleeding. (A small amount of blood from the   rectum is not serious, especially if hemorrhoids are present.  3.  Because air was put into your colon during the procedure, expelling   large amounts of air from your rectum is normal.  4.  You may not have a bowel movement for 1-3 days because of the   colonoscopy prep.  This is normal.  5.  Call immediately if you notice any of the following:   Chills and/or fever over 101   Persistent vomiting   Severe abdominal pain, other than gas cramps   Severe chest pain   Black, tarry stools   Any bleeding - exceeding one tablespoon  Your doctor recommends these additional instructions:  We are waiting for your pathology results.   If the pathology report reveals adenomatous (precancerous) tissue, then your   physician recommends a repeat colonoscopy for surveillance in 5 years.   If the pathology report indicates a hyperplastic polyp, then the colonoscopy   will be repeated for screening purposes in 8-10 years.   Eat a high fiber diet.   Take a fiber supplement, for example Citrucel, Fibercon, Konsyl or   Metamucil.   Call the G.I. clinic in 2 weeks for reports (if you haven't heard from us   sooner)  [phone].  None  If you have  any questions or problems, please call your physician.  EMERGENCY PHONE NUMBER: [phone]  LAB RESULTS: Call in two (2) weeks for lab results, [phone]  ___________________________________________  Nurse Signature  ___________________________________________  Patient/Designated Responsible Party Signature  Teja Rios MD  11/30/2020 3:02:26 PM  This report has been verified and signed electronically.  [employer]

## 2020-11-30 NOTE — BRIEF OP NOTE
Discharge Note  Short Stay      SUMMARY     Admit Date: 11/30/2020    Attending Physician: Teja Rios Jr., MD     Discharge Physician: Teja Rios Jr., MD    Discharge Date: 11/30/2020 3:04 PM    Final Diagnosis: Dysphagia, unspecified type [R13.10]    Impression:          - Normal esophagus. Biopsied.                        - Reflux esophagitis.                        - Z-line regular, 39 cm from the incisors.                        - Patulous lower esophageal sphincter.                        - White nummular lesions in gastric mucosa. Biopsied.                        - Gastritis. Biopsied.                        - Normal stomach.                        - Normal pylorus.                        - Normal examined duodenum.                        - Normal major papilla.                        - No endoscopic esophageal abnormality to explain                        patient's dysphagia. Esophagus dilated. Dilated.   Recommendation:      - Perform a colonoscopy as previously scheduled.                        - Discharge patient to home.                        - Await pathology results.                        - Observe patient's clinical course following                        today's procedure with therapeutic intervention.                        - Follow an antireflux regimen.                        - Continue present medications.                        - Use Prilosec (omeprazole) 40 mg PO daily.                        - Use Pepcid (famotidine) 40 mg PO daily.                        - Call the G.I. clinic in 2 weeks for reports (if                        you haven't heard from us sooner) [phone].                        - Return to GI clinic in 6 weeks.                        - If fails to improve, next perform a modified                        barium swallow.     Impression:          - One 2 mm polyp in the rectum, removed with a cold                        snare. Resected and retrieved.                         - Non-bleeding internal hemorrhoids.                        - Mild colonic spasm consistent with irritable bowel                        syndrome.                        - The examination was otherwise normal.                        - The examined portion of the ileum was normal.   Recommendation:      - Discharge patient to home.                        - Await pathology results.                        - If the pathology report reveals adenomatous                        tissue, then repeat the colonoscopy for surveillance                        in 5 years.                        - If the pathology report indicates hyperplastic                        polyp, then repeat colonoscopy for screening                        purposes in 8 years.                        - High fiber diet.                        - Use fiber, for example Citrucel, Fibercon, Konsyl                        or Metamucil.                        - Take a PROBIOTIC, such as a carton of GREEK YOGURT                        (Chobani or Oikos, or Activia or Dannon); or tablets                        of ALIGN or CULTURELLE or HUGH-Q (all                        non-prescription), every day for a month.                        - Call the G.I. clinic in 2 weeks for reports (if                        you haven't heard from us sooner) [phone].                        - Continue present medications.                          Teja Rios MD   11/30/2020     Disposition: HOME OR SELF CARE    Patient Instructions:   Current Discharge Medication List      CONTINUE these medications which have NOT CHANGED    Details   darifenacin (ENABLEX) 15 mg 24 hr tablet Take 15 mg by mouth once daily.  Refills: 3      estradiol (ESTRACE) 0.5 MG tablet Take 0.5 mg by mouth once daily.        famotidine (PEPCID) 40 MG tablet Take 1 tablet (40 mg total) by mouth nightly as needed for Heartburn.  Qty: 90 tablet, Refills: 3    Comments: Please inactivate all prior scripts  with same name and strength including any scripts on hold.  Associated Diagnoses: Gastroesophageal reflux disease, unspecified whether esophagitis present      multivitamin (ONE DAILY MULTIVITAMIN) per tablet Take 1 tablet by mouth once daily.      omeprazole (PRILOSEC) 40 MG capsule Take 1 capsule (40 mg total) by mouth once daily.  Qty: 90 capsule, Refills: 3      valACYclovir (VALTREX) 1000 MG tablet Take 1 tablet (1,000 mg total) by mouth 3 (three) times daily. for 7 days  Qty: 21 tablet, Refills: 1    Associated Diagnoses: Fever blister             Discharge Procedure Orders (must include Diet, Follow-up, Activity)    Follow Up:  Follow up with PCP as per your routine.  Please follow an anti reflux diet and a high fiber diet.  Activity as tolerated.    No driving day of procedure.    PROBIOTICS:  Now that your colon is so cleaned out, now is a good time for a round of PROBIOTICS.  Eat a container of Greek Yogurt, such as OIKOS or CHOBANI,  Or Activia or Dannon    Greek Yogurt.    Or Take a similar Probiotic product such as Align or Culturelle or Merlyn-Q, every day for a month.                  (The products listed are non-prescription, but you may need to ask the pharmacist for their location.)  Repeat this 4-6 times a year.

## 2020-12-01 VITALS
TEMPERATURE: 98 F | DIASTOLIC BLOOD PRESSURE: 66 MMHG | RESPIRATION RATE: 16 BRPM | HEART RATE: 62 BPM | OXYGEN SATURATION: 99 % | HEIGHT: 68 IN | WEIGHT: 274 LBS | BODY MASS INDEX: 41.52 KG/M2 | SYSTOLIC BLOOD PRESSURE: 129 MMHG

## 2020-12-14 LAB
FINAL PATHOLOGIC DIAGNOSIS: NORMAL
Lab: NORMAL

## 2020-12-22 ENCOUNTER — PATIENT OUTREACH (OUTPATIENT)
Dept: ADMINISTRATIVE | Facility: HOSPITAL | Age: 55
End: 2020-12-22

## 2020-12-22 NOTE — PROGRESS NOTES
Care Everywhere updates requested and reviewed.  Immunizations reconciled. Media reports reviewed.  Duplicate HM overrides and  orders removed.  Overdue HM topic chart audit and/or requested.  Overdue lab testing linked to upcoming lab appointments if applies.    RECENTLY REVIEWED/OUTREACHED

## 2021-01-08 ENCOUNTER — OFFICE VISIT (OUTPATIENT)
Dept: FAMILY MEDICINE | Facility: CLINIC | Age: 56
End: 2021-01-08
Payer: COMMERCIAL

## 2021-01-08 ENCOUNTER — HOSPITAL ENCOUNTER (OUTPATIENT)
Dept: RADIOLOGY | Facility: HOSPITAL | Age: 56
Discharge: HOME | End: 2021-01-08
Attending: FAMILY MEDICINE
Payer: COMMERCIAL

## 2021-01-08 VITALS
HEIGHT: 68 IN | SYSTOLIC BLOOD PRESSURE: 134 MMHG | DIASTOLIC BLOOD PRESSURE: 84 MMHG | OXYGEN SATURATION: 97 % | HEART RATE: 77 BPM | BODY MASS INDEX: 40.53 KG/M2 | WEIGHT: 267.44 LBS

## 2021-01-08 DIAGNOSIS — K21.9 GASTROESOPHAGEAL REFLUX DISEASE, UNSPECIFIED WHETHER ESOPHAGITIS PRESENT: ICD-10-CM

## 2021-01-08 DIAGNOSIS — Z13.6 SCREENING FOR CARDIOVASCULAR CONDITION: ICD-10-CM

## 2021-01-08 DIAGNOSIS — N32.81 OAB (OVERACTIVE BLADDER): ICD-10-CM

## 2021-01-08 DIAGNOSIS — Z00.00 ROUTINE MEDICAL EXAM: Primary | ICD-10-CM

## 2021-01-08 DIAGNOSIS — Z12.31 SCREENING MAMMOGRAM, ENCOUNTER FOR: ICD-10-CM

## 2021-01-08 PROCEDURE — 99999 PR PBB SHADOW E&M-EST. PATIENT-LVL IV: CPT | Mod: PBBFAC,,, | Performed by: FAMILY MEDICINE

## 2021-01-08 PROCEDURE — 77067 SCR MAMMO BI INCL CAD: CPT | Mod: TC,PO

## 2021-01-08 PROCEDURE — 3008F PR BODY MASS INDEX (BMI) DOCUMENTED: ICD-10-PCS | Mod: CPTII,S$GLB,, | Performed by: FAMILY MEDICINE

## 2021-01-08 PROCEDURE — 1126F AMNT PAIN NOTED NONE PRSNT: CPT | Mod: S$GLB,,, | Performed by: FAMILY MEDICINE

## 2021-01-08 PROCEDURE — 99396 PR PREVENTIVE VISIT,EST,40-64: ICD-10-PCS | Mod: S$GLB,,, | Performed by: FAMILY MEDICINE

## 2021-01-08 PROCEDURE — 77063 BREAST TOMOSYNTHESIS BI: CPT | Mod: 26,,, | Performed by: RADIOLOGY

## 2021-01-08 PROCEDURE — 3008F BODY MASS INDEX DOCD: CPT | Mod: CPTII,S$GLB,, | Performed by: FAMILY MEDICINE

## 2021-01-08 PROCEDURE — 77067 MAMMO DIGITAL SCREENING BILAT WITH TOMO: ICD-10-PCS | Mod: 26,,, | Performed by: RADIOLOGY

## 2021-01-08 PROCEDURE — 77067 SCR MAMMO BI INCL CAD: CPT | Mod: 26,,, | Performed by: RADIOLOGY

## 2021-01-08 PROCEDURE — 99396 PREV VISIT EST AGE 40-64: CPT | Mod: S$GLB,,, | Performed by: FAMILY MEDICINE

## 2021-01-08 PROCEDURE — 77063 MAMMO DIGITAL SCREENING BILAT WITH TOMO: ICD-10-PCS | Mod: 26,,, | Performed by: RADIOLOGY

## 2021-01-08 PROCEDURE — 1126F PR PAIN SEVERITY QUANTIFIED, NO PAIN PRESENT: ICD-10-PCS | Mod: S$GLB,,, | Performed by: FAMILY MEDICINE

## 2021-01-08 PROCEDURE — 99999 PR PBB SHADOW E&M-EST. PATIENT-LVL IV: ICD-10-PCS | Mod: PBBFAC,,, | Performed by: FAMILY MEDICINE

## 2021-01-08 RX ORDER — MIRABEGRON 25 MG/1
25 TABLET, FILM COATED, EXTENDED RELEASE ORAL DAILY
COMMUNITY
Start: 2020-10-06 | End: 2023-10-25 | Stop reason: ALTCHOICE

## 2021-01-27 ENCOUNTER — PATIENT OUTREACH (OUTPATIENT)
Dept: ADMINISTRATIVE | Facility: OTHER | Age: 56
End: 2021-01-27

## 2021-01-28 ENCOUNTER — OFFICE VISIT (OUTPATIENT)
Dept: GASTROENTEROLOGY | Facility: CLINIC | Age: 56
End: 2021-01-28
Payer: COMMERCIAL

## 2021-01-28 VITALS — BODY MASS INDEX: 40.69 KG/M2 | WEIGHT: 268.5 LBS | HEIGHT: 68 IN | RESPIRATION RATE: 18 BRPM

## 2021-01-28 DIAGNOSIS — K21.9 GASTROESOPHAGEAL REFLUX DISEASE, UNSPECIFIED WHETHER ESOPHAGITIS PRESENT: Primary | ICD-10-CM

## 2021-01-28 DIAGNOSIS — R13.10 DYSPHAGIA, UNSPECIFIED TYPE: ICD-10-CM

## 2021-01-28 PROCEDURE — 99213 OFFICE O/P EST LOW 20 MIN: CPT | Mod: S$GLB,,, | Performed by: INTERNAL MEDICINE

## 2021-01-28 PROCEDURE — 99999 PR PBB SHADOW E&M-EST. PATIENT-LVL III: ICD-10-PCS | Mod: PBBFAC,,, | Performed by: INTERNAL MEDICINE

## 2021-01-28 PROCEDURE — 3008F BODY MASS INDEX DOCD: CPT | Mod: CPTII,S$GLB,, | Performed by: INTERNAL MEDICINE

## 2021-01-28 PROCEDURE — 3008F PR BODY MASS INDEX (BMI) DOCUMENTED: ICD-10-PCS | Mod: CPTII,S$GLB,, | Performed by: INTERNAL MEDICINE

## 2021-01-28 PROCEDURE — 1126F PR PAIN SEVERITY QUANTIFIED, NO PAIN PRESENT: ICD-10-PCS | Mod: S$GLB,,, | Performed by: INTERNAL MEDICINE

## 2021-01-28 PROCEDURE — 99213 PR OFFICE/OUTPT VISIT, EST, LEVL III, 20-29 MIN: ICD-10-PCS | Mod: S$GLB,,, | Performed by: INTERNAL MEDICINE

## 2021-01-28 PROCEDURE — 99999 PR PBB SHADOW E&M-EST. PATIENT-LVL III: CPT | Mod: PBBFAC,,, | Performed by: INTERNAL MEDICINE

## 2021-01-28 PROCEDURE — 1126F AMNT PAIN NOTED NONE PRSNT: CPT | Mod: S$GLB,,, | Performed by: INTERNAL MEDICINE

## 2021-03-05 ENCOUNTER — IMMUNIZATION (OUTPATIENT)
Dept: FAMILY MEDICINE | Facility: CLINIC | Age: 56
End: 2021-03-05
Payer: COMMERCIAL

## 2021-03-05 DIAGNOSIS — Z23 NEED FOR VACCINATION: Primary | ICD-10-CM

## 2021-03-05 PROCEDURE — 91300 COVID-19, MRNA, LNP-S, PF, 30 MCG/0.3 ML DOSE VACCINE: CPT | Mod: PBBFAC | Performed by: FAMILY MEDICINE

## 2021-03-26 ENCOUNTER — IMMUNIZATION (OUTPATIENT)
Dept: FAMILY MEDICINE | Facility: CLINIC | Age: 56
End: 2021-03-26
Payer: COMMERCIAL

## 2021-03-26 DIAGNOSIS — Z23 NEED FOR VACCINATION: Primary | ICD-10-CM

## 2021-03-26 PROCEDURE — 0002A COVID-19, MRNA, LNP-S, PF, 30 MCG/0.3 ML DOSE VACCINE: CPT | Mod: PBBFAC | Performed by: FAMILY MEDICINE

## 2021-03-26 PROCEDURE — 91300 COVID-19, MRNA, LNP-S, PF, 30 MCG/0.3 ML DOSE VACCINE: CPT | Mod: PBBFAC | Performed by: FAMILY MEDICINE

## 2021-10-02 ENCOUNTER — LAB VISIT (OUTPATIENT)
Dept: LAB | Facility: HOSPITAL | Age: 56
End: 2021-10-02
Attending: FAMILY MEDICINE
Payer: COMMERCIAL

## 2021-10-02 DIAGNOSIS — Z13.6 SCREENING FOR CARDIOVASCULAR CONDITION: ICD-10-CM

## 2021-10-02 LAB
ALBUMIN SERPL BCP-MCNC: 3.8 G/DL (ref 3.5–5.2)
ALP SERPL-CCNC: 105 U/L (ref 55–135)
ALT SERPL W/O P-5'-P-CCNC: 27 U/L (ref 10–44)
ANION GAP SERPL CALC-SCNC: 11 MMOL/L (ref 8–16)
AST SERPL-CCNC: 19 U/L (ref 10–40)
BASOPHILS # BLD AUTO: 0.02 K/UL (ref 0–0.2)
BASOPHILS NFR BLD: 0.3 % (ref 0–1.9)
BILIRUB SERPL-MCNC: 0.4 MG/DL (ref 0.1–1)
BUN SERPL-MCNC: 18 MG/DL (ref 6–20)
CALCIUM SERPL-MCNC: 9.5 MG/DL (ref 8.7–10.5)
CHLORIDE SERPL-SCNC: 104 MMOL/L (ref 95–110)
CHOLEST SERPL-MCNC: 191 MG/DL (ref 120–199)
CHOLEST/HDLC SERPL: 3.5 {RATIO} (ref 2–5)
CO2 SERPL-SCNC: 23 MMOL/L (ref 23–29)
CREAT SERPL-MCNC: 0.8 MG/DL (ref 0.5–1.4)
DIFFERENTIAL METHOD: ABNORMAL
EOSINOPHIL # BLD AUTO: 0.1 K/UL (ref 0–0.5)
EOSINOPHIL NFR BLD: 1.1 % (ref 0–8)
ERYTHROCYTE [DISTWIDTH] IN BLOOD BY AUTOMATED COUNT: 13.2 % (ref 11.5–14.5)
EST. GFR  (AFRICAN AMERICAN): >60 ML/MIN/1.73 M^2
EST. GFR  (NON AFRICAN AMERICAN): >60 ML/MIN/1.73 M^2
GLUCOSE SERPL-MCNC: 102 MG/DL (ref 70–110)
HCT VFR BLD AUTO: 44.2 % (ref 37–48.5)
HDLC SERPL-MCNC: 55 MG/DL (ref 40–75)
HDLC SERPL: 28.8 % (ref 20–50)
HGB BLD-MCNC: 14.5 G/DL (ref 12–16)
IMM GRANULOCYTES # BLD AUTO: 0.01 K/UL (ref 0–0.04)
IMM GRANULOCYTES NFR BLD AUTO: 0.1 % (ref 0–0.5)
LDLC SERPL CALC-MCNC: 116.6 MG/DL (ref 63–159)
LYMPHOCYTES # BLD AUTO: 2.3 K/UL (ref 1–4.8)
LYMPHOCYTES NFR BLD: 30 % (ref 18–48)
MCH RBC QN AUTO: 31.7 PG (ref 27–31)
MCHC RBC AUTO-ENTMCNC: 32.8 G/DL (ref 32–36)
MCV RBC AUTO: 97 FL (ref 82–98)
MONOCYTES # BLD AUTO: 0.5 K/UL (ref 0.3–1)
MONOCYTES NFR BLD: 6.9 % (ref 4–15)
NEUTROPHILS # BLD AUTO: 4.6 K/UL (ref 1.8–7.7)
NEUTROPHILS NFR BLD: 61.6 % (ref 38–73)
NONHDLC SERPL-MCNC: 136 MG/DL
NRBC BLD-RTO: 0 /100 WBC
PLATELET # BLD AUTO: 278 K/UL (ref 150–450)
PMV BLD AUTO: 11.2 FL (ref 9.2–12.9)
POTASSIUM SERPL-SCNC: 4.1 MMOL/L (ref 3.5–5.1)
PROT SERPL-MCNC: 7.3 G/DL (ref 6–8.4)
RBC # BLD AUTO: 4.58 M/UL (ref 4–5.4)
SODIUM SERPL-SCNC: 138 MMOL/L (ref 136–145)
TRIGL SERPL-MCNC: 97 MG/DL (ref 30–150)
WBC # BLD AUTO: 7.5 K/UL (ref 3.9–12.7)

## 2021-10-02 PROCEDURE — 80061 LIPID PANEL: CPT | Performed by: FAMILY MEDICINE

## 2021-10-02 PROCEDURE — 85025 COMPLETE CBC W/AUTO DIFF WBC: CPT | Performed by: FAMILY MEDICINE

## 2021-10-02 PROCEDURE — 80053 COMPREHEN METABOLIC PANEL: CPT | Performed by: FAMILY MEDICINE

## 2021-10-02 PROCEDURE — 36415 COLL VENOUS BLD VENIPUNCTURE: CPT | Mod: PO | Performed by: FAMILY MEDICINE

## 2021-10-08 ENCOUNTER — OFFICE VISIT (OUTPATIENT)
Dept: FAMILY MEDICINE | Facility: CLINIC | Age: 56
End: 2021-10-08
Payer: COMMERCIAL

## 2021-10-08 VITALS
HEART RATE: 85 BPM | SYSTOLIC BLOOD PRESSURE: 128 MMHG | BODY MASS INDEX: 39.29 KG/M2 | WEIGHT: 258.38 LBS | OXYGEN SATURATION: 100 % | DIASTOLIC BLOOD PRESSURE: 78 MMHG

## 2021-10-08 DIAGNOSIS — Z79.890 POST-MENOPAUSE ON HRT (HORMONE REPLACEMENT THERAPY): ICD-10-CM

## 2021-10-08 DIAGNOSIS — K21.9 GASTROESOPHAGEAL REFLUX DISEASE, UNSPECIFIED WHETHER ESOPHAGITIS PRESENT: Primary | ICD-10-CM

## 2021-10-08 DIAGNOSIS — N32.81 OAB (OVERACTIVE BLADDER): ICD-10-CM

## 2021-10-08 PROCEDURE — 3078F DIAST BP <80 MM HG: CPT | Mod: CPTII,S$GLB,, | Performed by: FAMILY MEDICINE

## 2021-10-08 PROCEDURE — 1159F PR MEDICATION LIST DOCUMENTED IN MEDICAL RECORD: ICD-10-PCS | Mod: CPTII,S$GLB,, | Performed by: FAMILY MEDICINE

## 2021-10-08 PROCEDURE — 90686 IIV4 VACC NO PRSV 0.5 ML IM: CPT | Mod: S$GLB,,, | Performed by: FAMILY MEDICINE

## 2021-10-08 PROCEDURE — 99999 PR PBB SHADOW E&M-EST. PATIENT-LVL III: CPT | Mod: PBBFAC,,, | Performed by: FAMILY MEDICINE

## 2021-10-08 PROCEDURE — 90715 TDAP VACCINE GREATER THAN OR EQUAL TO 7YO IM: ICD-10-PCS | Mod: S$GLB,,, | Performed by: FAMILY MEDICINE

## 2021-10-08 PROCEDURE — 99214 OFFICE O/P EST MOD 30 MIN: CPT | Mod: 25,S$GLB,, | Performed by: FAMILY MEDICINE

## 2021-10-08 PROCEDURE — 1159F MED LIST DOCD IN RCRD: CPT | Mod: CPTII,S$GLB,, | Performed by: FAMILY MEDICINE

## 2021-10-08 PROCEDURE — 3074F SYST BP LT 130 MM HG: CPT | Mod: CPTII,S$GLB,, | Performed by: FAMILY MEDICINE

## 2021-10-08 PROCEDURE — 90472 TDAP VACCINE GREATER THAN OR EQUAL TO 7YO IM: ICD-10-PCS | Mod: S$GLB,,, | Performed by: FAMILY MEDICINE

## 2021-10-08 PROCEDURE — 3008F PR BODY MASS INDEX (BMI) DOCUMENTED: ICD-10-PCS | Mod: CPTII,S$GLB,, | Performed by: FAMILY MEDICINE

## 2021-10-08 PROCEDURE — 90471 FLU VACCINE (QUAD) GREATER THAN OR EQUAL TO 3YO PRESERVATIVE FREE IM: ICD-10-PCS | Mod: S$GLB,,, | Performed by: FAMILY MEDICINE

## 2021-10-08 PROCEDURE — 99214 PR OFFICE/OUTPT VISIT, EST, LEVL IV, 30-39 MIN: ICD-10-PCS | Mod: 25,S$GLB,, | Performed by: FAMILY MEDICINE

## 2021-10-08 PROCEDURE — 1160F RVW MEDS BY RX/DR IN RCRD: CPT | Mod: CPTII,S$GLB,, | Performed by: FAMILY MEDICINE

## 2021-10-08 PROCEDURE — 90472 IMMUNIZATION ADMIN EACH ADD: CPT | Mod: S$GLB,,, | Performed by: FAMILY MEDICINE

## 2021-10-08 PROCEDURE — 3074F PR MOST RECENT SYSTOLIC BLOOD PRESSURE < 130 MM HG: ICD-10-PCS | Mod: CPTII,S$GLB,, | Performed by: FAMILY MEDICINE

## 2021-10-08 PROCEDURE — 3078F PR MOST RECENT DIASTOLIC BLOOD PRESSURE < 80 MM HG: ICD-10-PCS | Mod: CPTII,S$GLB,, | Performed by: FAMILY MEDICINE

## 2021-10-08 PROCEDURE — 90471 IMMUNIZATION ADMIN: CPT | Mod: S$GLB,,, | Performed by: FAMILY MEDICINE

## 2021-10-08 PROCEDURE — 1160F PR REVIEW ALL MEDS BY PRESCRIBER/CLIN PHARMACIST DOCUMENTED: ICD-10-PCS | Mod: CPTII,S$GLB,, | Performed by: FAMILY MEDICINE

## 2021-10-08 PROCEDURE — 90686 FLU VACCINE (QUAD) GREATER THAN OR EQUAL TO 3YO PRESERVATIVE FREE IM: ICD-10-PCS | Mod: S$GLB,,, | Performed by: FAMILY MEDICINE

## 2021-10-08 PROCEDURE — 99999 PR PBB SHADOW E&M-EST. PATIENT-LVL III: ICD-10-PCS | Mod: PBBFAC,,, | Performed by: FAMILY MEDICINE

## 2021-10-08 PROCEDURE — 3008F BODY MASS INDEX DOCD: CPT | Mod: CPTII,S$GLB,, | Performed by: FAMILY MEDICINE

## 2021-10-08 PROCEDURE — 90715 TDAP VACCINE 7 YRS/> IM: CPT | Mod: S$GLB,,, | Performed by: FAMILY MEDICINE

## 2021-10-20 RX ORDER — OMEPRAZOLE 40 MG/1
CAPSULE, DELAYED RELEASE ORAL
Qty: 90 CAPSULE | Refills: 3 | Status: SHIPPED | OUTPATIENT
Start: 2021-10-20 | End: 2022-11-14 | Stop reason: SDUPTHER

## 2021-11-30 ENCOUNTER — IMMUNIZATION (OUTPATIENT)
Dept: FAMILY MEDICINE | Facility: CLINIC | Age: 56
End: 2021-11-30
Payer: COMMERCIAL

## 2021-11-30 DIAGNOSIS — Z23 NEED FOR VACCINATION: Primary | ICD-10-CM

## 2021-11-30 PROCEDURE — 0004A COVID-19, MRNA, LNP-S, PF, 30 MCG/0.3 ML DOSE VACCINE: CPT | Mod: PBBFAC | Performed by: FAMILY MEDICINE

## 2022-01-05 DIAGNOSIS — B00.1 FEVER BLISTER: ICD-10-CM

## 2022-01-05 NOTE — TELEPHONE ENCOUNTER
No new care gaps identified.  Powered by [employer]. Reference number: [id number].   1/05/2022 5:38:17 PM CST

## 2022-01-05 NOTE — TELEPHONE ENCOUNTER
No new care gaps identified.  Powered by [employer]. Reference number: [id number].   1/05/2022 5:39:30 PM CST

## 2022-01-06 RX ORDER — VALACYCLOVIR HYDROCHLORIDE 1 G/1
TABLET, FILM COATED ORAL
Qty: 30 TABLET | Refills: 5 | Status: SHIPPED | OUTPATIENT
Start: 2022-01-06 | End: 2022-10-27

## 2022-01-06 RX ORDER — VALACYCLOVIR HYDROCHLORIDE 1 G/1
TABLET, FILM COATED ORAL
Qty: 30 TABLET | Refills: 5 | OUTPATIENT
Start: 2022-01-06

## 2022-01-06 NOTE — TELEPHONE ENCOUNTER
Refill Authorization Note   Funmi Houston  is requesting a refill authorization.  Brief Assessment and Rationale for Refill:  Approve     Medication Therapy Plan:       Medication Reconciliation Completed: No   Comments:   --->Care Gap information included below if applicable.   Orders Placed This Encounter    valACYclovir (VALTREX) 1000 MG tablet      Requested Prescriptions   Signed Prescriptions Disp Refills    valACYclovir (VALTREX) 1000 MG tablet 30 tablet 5     Sig: TAKE 1 TABLET (1,000 MG TOTAL) BY MOUTH EVERY 12 (TWELVE) HOURS.       Antimicrobials:  Oral Antiviral Agents - Anti-Herpetic Passed - 1/5/2022  5:38 PM        Passed - Patient is at least 18 years old        Passed - Valid encounter within last 15 months     Recent Visits  Date Type Provider Dept   10/08/21 Office Visit Abelardo Burks MD [facility]   01/08/21 Office Visit Abelardo Burks MD [facility]   10/26/20 Office Visit Abelardo Burks MD [facility]   10/06/20 Office Visit Abelardo Burks MD [facility]   Showing recent visits within past 720 days and meeting all other requirements  Future Appointments  No visits were found meeting these conditions.  Showing future appointments within next 150 days and meeting all other requirements                Passed - Cr is 1.39 or below and within 360 days     Lab Results   Component Value Date    CREATININE 0.8 10/02/2021    CREATININE 0.8 09/24/2020    CREATININE 0.8 07/31/2019              Passed - WBC in normal range and within 360 days     WBC   Date Value Ref Range Status   10/02/2021 7.50 3.90 - 12.70 K/uL Final   09/24/2020 6.93 3.90 - 12.70 K/uL Final   05/25/2005 5.41 4.8 - 10.8 K/uL Final              Passed - eGFR within 360 days     Lab Results   Component Value Date    EGFRNONAA >60.0 10/02/2021    EGFRNONAA >60.0 09/24/2020    EGFRNONAA >60.0 07/31/2019                    Appointments  past 12m or future 3m with PCP    Date Provider    Last Visit   10/8/2021 Abelardo Burks MD   Next Visit   1/5/2022 Abelardo Burks MD   ED visits in past 90 days: 0     Note composed:4:13 PM 01/06/2022

## 2022-01-19 DIAGNOSIS — Z12.31 OTHER SCREENING MAMMOGRAM: ICD-10-CM

## 2022-05-31 ENCOUNTER — PATIENT MESSAGE (OUTPATIENT)
Dept: ADMINISTRATIVE | Facility: HOSPITAL | Age: 57
End: 2022-05-31
Payer: COMMERCIAL

## 2022-07-11 ENCOUNTER — PATIENT MESSAGE (OUTPATIENT)
Dept: SPINE | Facility: CLINIC | Age: 57
End: 2022-07-11
Payer: COMMERCIAL

## 2022-07-15 DIAGNOSIS — M25.511 RIGHT SHOULDER PAIN: Primary | ICD-10-CM

## 2022-07-18 ENCOUNTER — OFFICE VISIT (OUTPATIENT)
Dept: PAIN MEDICINE | Facility: CLINIC | Age: 57
End: 2022-07-18
Payer: COMMERCIAL

## 2022-07-18 ENCOUNTER — HOSPITAL ENCOUNTER (OUTPATIENT)
Dept: RADIOLOGY | Facility: HOSPITAL | Age: 57
Discharge: HOME | End: 2022-07-18
Attending: ORTHOPAEDIC SURGERY
Payer: COMMERCIAL

## 2022-07-18 ENCOUNTER — OFFICE VISIT (OUTPATIENT)
Dept: ORTHOPEDICS | Facility: CLINIC | Age: 57
End: 2022-07-18
Payer: COMMERCIAL

## 2022-07-18 VITALS
WEIGHT: 263.75 LBS | BODY MASS INDEX: 39.97 KG/M2 | DIASTOLIC BLOOD PRESSURE: 74 MMHG | SYSTOLIC BLOOD PRESSURE: 131 MMHG | HEART RATE: 69 BPM | HEIGHT: 68 IN

## 2022-07-18 VITALS — HEIGHT: 68 IN | BODY MASS INDEX: 39.1 KG/M2 | WEIGHT: 258 LBS

## 2022-07-18 DIAGNOSIS — M25.511 ACUTE PAIN OF RIGHT SHOULDER: Primary | ICD-10-CM

## 2022-07-18 DIAGNOSIS — M25.511 RIGHT SHOULDER PAIN: ICD-10-CM

## 2022-07-18 DIAGNOSIS — M54.16 LUMBAR RADICULOPATHY: Primary | ICD-10-CM

## 2022-07-18 PROCEDURE — 99999 PR PBB SHADOW E&M-EST. PATIENT-LVL IV: ICD-10-PCS | Mod: PBBFAC,,, | Performed by: PHYSICIAN ASSISTANT

## 2022-07-18 PROCEDURE — 20610 LARGE JOINT ASPIRATION/INJECTION: R SUBACROMIAL BURSA: ICD-10-PCS | Mod: RT,S$GLB,, | Performed by: ORTHOPAEDIC SURGERY

## 2022-07-18 PROCEDURE — 1159F MED LIST DOCD IN RCRD: CPT | Mod: CPTII,S$GLB,, | Performed by: ORTHOPAEDIC SURGERY

## 2022-07-18 PROCEDURE — 99203 OFFICE O/P NEW LOW 30 MIN: CPT | Mod: 25,S$GLB,, | Performed by: PHYSICIAN ASSISTANT

## 2022-07-18 PROCEDURE — 3008F BODY MASS INDEX DOCD: CPT | Mod: CPTII,S$GLB,, | Performed by: PHYSICIAN ASSISTANT

## 2022-07-18 PROCEDURE — 1160F PR REVIEW ALL MEDS BY PRESCRIBER/CLIN PHARMACIST DOCUMENTED: ICD-10-PCS | Mod: CPTII,S$GLB,, | Performed by: PHYSICIAN ASSISTANT

## 2022-07-18 PROCEDURE — 20610 DRAIN/INJ JOINT/BURSA W/O US: CPT | Mod: RT,S$GLB,, | Performed by: ORTHOPAEDIC SURGERY

## 2022-07-18 PROCEDURE — 99203 PR OFFICE/OUTPT VISIT, NEW, LEVL III, 30-44 MIN: ICD-10-PCS | Mod: 25,S$GLB,, | Performed by: ORTHOPAEDIC SURGERY

## 2022-07-18 PROCEDURE — 96372 THER/PROPH/DIAG INJ SC/IM: CPT | Mod: S$GLB,,, | Performed by: PHYSICIAN ASSISTANT

## 2022-07-18 PROCEDURE — 99203 OFFICE O/P NEW LOW 30 MIN: CPT | Mod: 25,S$GLB,, | Performed by: ORTHOPAEDIC SURGERY

## 2022-07-18 PROCEDURE — 1159F MED LIST DOCD IN RCRD: CPT | Mod: CPTII,S$GLB,, | Performed by: PHYSICIAN ASSISTANT

## 2022-07-18 PROCEDURE — 1159F PR MEDICATION LIST DOCUMENTED IN MEDICAL RECORD: ICD-10-PCS | Mod: CPTII,S$GLB,, | Performed by: PHYSICIAN ASSISTANT

## 2022-07-18 PROCEDURE — 1160F PR REVIEW ALL MEDS BY PRESCRIBER/CLIN PHARMACIST DOCUMENTED: ICD-10-PCS | Mod: CPTII,S$GLB,, | Performed by: ORTHOPAEDIC SURGERY

## 2022-07-18 PROCEDURE — 99999 PR PBB SHADOW E&M-EST. PATIENT-LVL III: ICD-10-PCS | Mod: PBBFAC,,, | Performed by: ORTHOPAEDIC SURGERY

## 2022-07-18 PROCEDURE — 1159F PR MEDICATION LIST DOCUMENTED IN MEDICAL RECORD: ICD-10-PCS | Mod: CPTII,S$GLB,, | Performed by: ORTHOPAEDIC SURGERY

## 2022-07-18 PROCEDURE — 99203 PR OFFICE/OUTPT VISIT, NEW, LEVL III, 30-44 MIN: ICD-10-PCS | Mod: 25,S$GLB,, | Performed by: PHYSICIAN ASSISTANT

## 2022-07-18 PROCEDURE — 3075F PR MOST RECENT SYSTOLIC BLOOD PRESS GE 130-139MM HG: ICD-10-PCS | Mod: CPTII,S$GLB,, | Performed by: PHYSICIAN ASSISTANT

## 2022-07-18 PROCEDURE — 73030 X-RAY EXAM OF SHOULDER: CPT | Mod: TC,PO,RT

## 2022-07-18 PROCEDURE — 3078F DIAST BP <80 MM HG: CPT | Mod: CPTII,S$GLB,, | Performed by: PHYSICIAN ASSISTANT

## 2022-07-18 PROCEDURE — 96372 PR INJECTION,THERAP/PROPH/DIAG2ST, IM OR SUBCUT: ICD-10-PCS | Mod: S$GLB,,, | Performed by: PHYSICIAN ASSISTANT

## 2022-07-18 PROCEDURE — 1160F RVW MEDS BY RX/DR IN RCRD: CPT | Mod: CPTII,S$GLB,, | Performed by: PHYSICIAN ASSISTANT

## 2022-07-18 PROCEDURE — 3078F PR MOST RECENT DIASTOLIC BLOOD PRESSURE < 80 MM HG: ICD-10-PCS | Mod: CPTII,S$GLB,, | Performed by: PHYSICIAN ASSISTANT

## 2022-07-18 PROCEDURE — 3008F BODY MASS INDEX DOCD: CPT | Mod: CPTII,S$GLB,, | Performed by: ORTHOPAEDIC SURGERY

## 2022-07-18 PROCEDURE — 3008F PR BODY MASS INDEX (BMI) DOCUMENTED: ICD-10-PCS | Mod: CPTII,S$GLB,, | Performed by: ORTHOPAEDIC SURGERY

## 2022-07-18 PROCEDURE — 3075F SYST BP GE 130 - 139MM HG: CPT | Mod: CPTII,S$GLB,, | Performed by: PHYSICIAN ASSISTANT

## 2022-07-18 PROCEDURE — 73030 X-RAY EXAM OF SHOULDER: CPT | Mod: 26,RT,, | Performed by: RADIOLOGY

## 2022-07-18 PROCEDURE — 3008F PR BODY MASS INDEX (BMI) DOCUMENTED: ICD-10-PCS | Mod: CPTII,S$GLB,, | Performed by: PHYSICIAN ASSISTANT

## 2022-07-18 PROCEDURE — 99999 PR PBB SHADOW E&M-EST. PATIENT-LVL IV: CPT | Mod: PBBFAC,,, | Performed by: PHYSICIAN ASSISTANT

## 2022-07-18 PROCEDURE — 73030 XR SHOULDER TRAUMA 3 VIEW RIGHT: ICD-10-PCS | Mod: 26,RT,, | Performed by: RADIOLOGY

## 2022-07-18 PROCEDURE — 99999 PR PBB SHADOW E&M-EST. PATIENT-LVL III: CPT | Mod: PBBFAC,,, | Performed by: ORTHOPAEDIC SURGERY

## 2022-07-18 PROCEDURE — 1160F RVW MEDS BY RX/DR IN RCRD: CPT | Mod: CPTII,S$GLB,, | Performed by: ORTHOPAEDIC SURGERY

## 2022-07-18 RX ORDER — METHYLPREDNISOLONE 4 MG/1
TABLET ORAL
Qty: 1 EACH | Refills: 0 | Status: SHIPPED | OUTPATIENT
Start: 2022-07-18 | End: 2022-08-08

## 2022-07-18 RX ORDER — KETOROLAC TROMETHAMINE 30 MG/ML
30 INJECTION, SOLUTION INTRAMUSCULAR; INTRAVENOUS ONCE
Status: COMPLETED | OUTPATIENT
Start: 2022-07-18 | End: 2022-07-18

## 2022-07-18 RX ORDER — TIZANIDINE 4 MG/1
4 TABLET ORAL NIGHTLY PRN
Qty: 40 TABLET | Refills: 0 | Status: SHIPPED | OUTPATIENT
Start: 2022-07-18 | End: 2022-08-09

## 2022-07-18 RX ORDER — TRIAMCINOLONE ACETONIDE 40 MG/ML
80 INJECTION, SUSPENSION INTRA-ARTICULAR; INTRAMUSCULAR
Status: DISCONTINUED | OUTPATIENT
Start: 2022-07-18 | End: 2022-07-18 | Stop reason: HOSPADM

## 2022-07-18 RX ADMIN — TRIAMCINOLONE ACETONIDE 80 MG: 40 INJECTION, SUSPENSION INTRA-ARTICULAR; INTRAMUSCULAR at 01:07

## 2022-07-18 RX ADMIN — KETOROLAC TROMETHAMINE 30 MG: 30 INJECTION, SOLUTION INTRAMUSCULAR; INTRAVENOUS at 04:07

## 2022-07-18 NOTE — PROGRESS NOTES
56 years old right shoulder pain for about 3 months time aching pain 6 on good days 10 on bad days pain at times lies on that side difficulty with overhead activity reaching behind her back    Exam shows positive Neer Jose impingement sign, cuff strength weak and painful no signs infection instability    X-rays show AC arthrosis    Assessment:  New see arthrosis degenerative rotator cuff disease right shoulder    Plan:  Kenalog injection right shoulder, home exercise program, follow-up as needed    Patient seen as a consult from Dr. Abelardo Bursk, communication via [facility]    Imaging studies ordered and reviewed by me    Further History  Aching pain  Worse with activity  Relieved with rest  No other associated symptoms  No other radiation    Further Exam  Alert and oriented  Pleasant  Contralateral limb has appropriate range of motion for age and condition  Contralateral limb has appropriate strength for age and condition  Contralateral limb has appropriate stability  for age and condition  No adenopathy  Pulses are appropriate for current condition  Skin is intact        Chief Complaint    Chief Complaint   Patient presents with    Right Shoulder - Pain       HPI  Funmi Houston is a 56 y.o.  female who presents with       Past Medical History  Past Medical History:   Diagnosis Date    Edema     OAB (overactive bladder)     Restless leg syndrome        Past Surgical History  Past Surgical History:   Procedure Laterality Date    CHOLECYSTECTOMY      COLONOSCOPY N/A 11/30/2020    Procedure: COLONOSCOPY;  Surgeon: Teja Rios Jr., MD;  Location: [facility];  Service: Endoscopy;  Laterality: N/A;    ESOPHAGOGASTRODUODENOSCOPY      ESOPHAGOGASTRODUODENOSCOPY N/A 11/30/2020    Procedure: ESOPHAGOGASTRODUODENOSCOPY (EGD);  Surgeon: Teja Rios Jr., MD;  Location: [facility];  Service: Endoscopy;  Laterality: N/A;    TONSILLECTOMY      TOTAL VAGINAL HYSTERECTOMY         Medications  Current Outpatient  Medications   Medication Sig    estradiol (ESTRACE) 0.5 MG tablet Take 0.5 mg by mouth once daily.      famotidine (PEPCID) 40 MG tablet TAKE 1 TABLET (40 MG TOTAL) BY MOUTH NIGHTLY AS NEEDED FOR HEARTBURN.    multivitamin (THERAGRAN) per tablet Take 1 tablet by mouth once daily.    MYRBETRIQ 25 mg Tb24 ER tablet Take 25 mg by mouth once daily.    omeprazole (PRILOSEC) 40 MG capsule TAKE 1 CAPSULE BY MOUTH EVERY DAY    valACYclovir (VALTREX) 1000 MG tablet TAKE 1 TABLET (1,000 MG TOTAL) BY MOUTH EVERY 12 (TWELVE) HOURS.     No current facility-administered medications for this visit.       Allergies  Review of patient's allergies indicates:   Allergen Reactions    Penicillins Hives and Rash       Family History  Family History   Problem Relation Age of Onset    Macular degeneration Other     Diabetes Mother     Kidney disease Mother     Stroke Mother     Heart disease Mother     Diabetes Father     Stroke Father     Heart disease Father     Macular degeneration Father     Glaucoma Father     Diabetes Brother     Heart disease Brother     Macular degeneration Brother     Macular degeneration Sister     Macular degeneration Brother        Social History  Social History     Socioeconomic History    Marital status:    Tobacco Use    Smoking status: Never Smoker    Smokeless tobacco: Never Used   Substance and Sexual Activity    Alcohol use: Yes     Comment: seldom    Drug use: No   Social History Narrative        2 children                   Review of Systems     Constitutional: Negative    HENT: Negative  Eyes: Negative  Respiratory: Negative  Cardiovascular: Negative  Musculoskeletal: HPI  Skin: Negative  Neurological: Negative  Hematological: Negative  Endocrine: Negative                 Physical Exam    There were no vitals filed for this visit.  Body mass index is 39.23 kg/m².  Physical Examination:     General appearance -  well appearing, and in no  distress  Mental status - awake  Neck - supple  Chest -  symmetric air entry  Heart - normal rate   Abdomen - soft      Assessment     1. Acute pain of right shoulder          Plan

## 2022-07-18 NOTE — PROCEDURES
Large Joint Aspiration/Injection: R subacromial bursa    Date/Time: 7/18/2022 1:00 PM  Performed by: Timi Washburn MD  Authorized by: Timi Washburn MD     Consent Done?:  Yes (Verbal)  Site marked: the procedure site was marked    Prep: patient was prepped and draped in usual sterile fashion      Details:  Needle Size:  21 G  Approach:  Posterior  Location:  Shoulder  Site:  R subacromial bursa  Medications:  80 mg triamcinolone acetonide 40 mg/mL  Patient tolerance:  Patient tolerated the procedure well with no immediate complications

## 2022-07-20 NOTE — PROGRESS NOTES
Back and Spine Consult    Patient ID: Funmi Houston is a 56 y.o. female.    Chief Complaint   Patient presents with    Low-back Pain    Hip Pain    Leg Pain       Review of Systems   Constitutional: Negative for activity change, appetite change, chills, fever and unexpected weight change.   HENT: Negative for tinnitus, trouble swallowing and voice change.    Respiratory: Negative for apnea, cough, chest tightness and shortness of breath.    Cardiovascular: Negative for chest pain and palpitations.   Gastrointestinal: Negative for constipation, diarrhea, nausea and vomiting.   Genitourinary: Negative for difficulty urinating, dysuria, frequency and urgency.   Musculoskeletal: Positive for back pain and myalgias. Negative for gait problem, neck pain and neck stiffness.   Skin: Negative for wound.   Neurological: Negative for dizziness, tremors, seizures, facial asymmetry, speech difficulty, weakness, light-headedness, numbness and headaches.   Psychiatric/Behavioral: Negative for confusion and decreased concentration.       Past Medical History:   Diagnosis Date    Edema     OAB (overactive bladder)     Restless leg syndrome      Social History     Socioeconomic History    Marital status:    Tobacco Use    Smoking status: Never Smoker    Smokeless tobacco: Never Used   Substance and Sexual Activity    Alcohol use: Yes     Comment: seldom    Drug use: No   Social History Narrative        2 children         Family History   Problem Relation Age of Onset    Macular degeneration Other     Diabetes Mother     Kidney disease Mother     Stroke Mother     Heart disease Mother     Diabetes Father     Stroke Father     Heart disease Father     Macular degeneration Father     Glaucoma Father     Diabetes Brother     Heart disease Brother     Macular degeneration Brother     Macular degeneration Sister     Macular degeneration Brother      Review of patient's allergies  "indicates:   Allergen Reactions    Penicillins Hives and Rash       Current Outpatient Medications:     estradiol (ESTRACE) 0.5 MG tablet, Take 0.5 mg by mouth once daily.  , Disp: , Rfl:     famotidine (PEPCID) 40 MG tablet, TAKE 1 TABLET (40 MG TOTAL) BY MOUTH NIGHTLY AS NEEDED FOR HEARTBURN., Disp: 90 tablet, Rfl: 3    multivitamin (THERAGRAN) per tablet, Take 1 tablet by mouth once daily., Disp: , Rfl:     MYRBETRIQ 25 mg Tb24 ER tablet, Take 25 mg by mouth once daily., Disp: , Rfl:     omeprazole (PRILOSEC) 40 MG capsule, TAKE 1 CAPSULE BY MOUTH EVERY DAY, Disp: 90 capsule, Rfl: 3    valACYclovir (VALTREX) 1000 MG tablet, TAKE 1 TABLET (1,000 MG TOTAL) BY MOUTH EVERY 12 (TWELVE) HOURS., Disp: 30 tablet, Rfl: 5    methylPREDNISolone (MEDROL, NASIMA,) 4 mg tablet, use as directed.  Start 7/19/2022, Disp: 1 each, Rfl: 0    tiZANidine (ZANAFLEX) 4 MG tablet, Take 1 tablet (4 mg total) by mouth nightly as needed (muscle spasms/ pain)., Disp: 40 tablet, Rfl: 0    Vitals:    07/18/22 1536   BP: 131/74   Pulse: 69   Weight: 119.7 kg (263 lb 12.5 oz)   Height: 5' 8" (1.727 m)       Physical Exam  Vitals and nursing note reviewed.   Constitutional:       Appearance: She is well-developed.   HENT:      Head: Normocephalic and atraumatic.   Eyes:      Pupils: Pupils are equal, round, and reactive to light.   Cardiovascular:      Rate and Rhythm: Normal rate.   Pulmonary:      Effort: Pulmonary effort is normal.   Musculoskeletal:         General: Normal range of motion.      Cervical back: Normal range of motion and neck supple.   Skin:     General: Skin is warm and dry.   Neurological:      Mental Status: She is alert and oriented to person, place, and time.      Coordination: Finger-Nose-Finger Test, Heel to Shin Test and Romberg Test normal.      Gait: Gait is intact. Tandem walk normal.      Deep Tendon Reflexes:      Reflex Scores:       Tricep reflexes are 2+ on the right side and 2+ on the left side.       " Bicep reflexes are 2+ on the right side and 2+ on the left side.       Brachioradialis reflexes are 2+ on the right side and 2+ on the left side.       Patellar reflexes are 2+ on the right side and 2+ on the left side.       Achilles reflexes are 2+ on the right side and 2+ on the left side.  Psychiatric:         Speech: Speech normal.         Behavior: Behavior normal.         Thought Content: Thought content normal.         Judgment: Judgment normal.         Neurologic Exam     Mental Status   Oriented to person, place, and time.   Oriented to person.   Oriented to place.   Oriented to time.   Follows 3 step commands.   Attention: normal. Concentration: normal.   Speech: speech is normal   Level of consciousness: alert  Knowledge: consistent with education.   Able to name object. Able to read. Able to repeat. Able to write. Normal comprehension.     Cranial Nerves     CN II   Visual acuity: normal  Right visual field deficit: none  Left visual field deficit: none     CN III, IV, VI   Pupils are equal, round, and reactive to light.  Right pupil: Size: 3 mm. Shape: regular. Reactivity: brisk. Consensual response: intact.   Left pupil: Size: 3 mm. Shape: regular. Reactivity: brisk. Consensual response: intact.   CN III: no CN III palsy  CN VI: no CN VI palsy  Nystagmus: none   Diplopia: none  Ophthalmoparesis: none  Conjugate gaze: present    CN V   Right facial sensation deficit: none  Left facial sensation deficit: none    CN VII   Right facial weakness: none  Left facial weakness: none    CN VIII   Hearing: intact    CN IX, X   CN IX normal.   CN X normal.     CN XI   Right sternocleidomastoid strength: normal  Left sternocleidomastoid strength: normal  Right trapezius strength: normal  Left trapezius strength: normal    CN XII   Fasciculations: absent  Tongue deviation: none    Motor Exam   Muscle bulk: normal  Overall muscle tone: normal  Right arm pronator drift: absent  Left arm pronator drift:  absent    Strength   Right neck flexion: 5/5  Left neck flexion: 5/5  Right neck extension: 5/5  Left neck extension: 5/5  Right deltoid: 5/5  Left deltoid: 5/5  Right biceps: 5/5  Left biceps: 5/5  Right triceps: 5/5  Left triceps: 5/5  Right wrist flexion: 5/5  Left wrist flexion: 5/5  Right wrist extension: 5/5  Left wrist extension: 5/5  Right interossei: 5/5  Left interossei: 5/5  Right abdominals: 5/5  Left abdominals: 5/5  Right iliopsoas: 5/5  Left iliopsoas: 5/5  Right quadriceps: 5/5  Left quadriceps: 5/5  Right hamstrin5  Left hamstrin/5  Right glutei: 55  Left glutei: 5  Right anterior tibial: 55  Left anterior tibial: 55  Right posterior tibial: 55  Left posterior tibial: 55  Right peroneal: 55  Left peroneal: 55  Right gastroc: 5/5  Left gastroc: 55    Sensory Exam   Right arm light touch: normal  Left arm light touch: normal  Right leg light touch: normal  Left leg light touch: normal  Right arm vibration: normal  Left arm vibration: normal  Right arm pinprick: normal  Left arm pinprick: normal    Gait, Coordination, and Reflexes     Gait  Gait: normal    Coordination   Romberg: negative  Finger to nose coordination: normal  Heel to shin coordination: normal  Tandem walking coordination: normal    Tremor   Resting tremor: absent  Intention tremor: absent  Action tremor: absent    Reflexes   Right brachioradialis: 2+  Left brachioradialis: 2+  Right biceps: 2+  Left biceps: 2+  Right triceps: 2+  Left triceps: 2+  Right patellar: 2+  Left patellar: 2+  Right achilles: 2+  Left achilles: 2+  Right Drummond: absent  Left Drummond: absent  Right ankle clonus: absent  Left ankle clonus: absent      Provider dictation:    56 year old female with restless legs and GERD presents to discuss back and leg pain.  She first had pain 2 months ago that resolved, but recurred 3-4 weeks ago without trauma.  She has pain in the right lower back that has progressed to also include with right buttock,  leg and calf.  Currently she has intermittent shooting pain in the right buttock and a constant pain in the right posterior thigh/ knee. She has muscle spasms in the calf.  No current numbness or tingling.  Pain improves in the pool.  No specific aggravating factors.    Current medications:  Aleve (last dose greater than 12 hours ago)  Medications tried:  Ibuprofen, percocet.  Physical therapy:  none  Interventional Procedures:  none     On exam, there is 5/5 strength with 2+ DTR and no sensory deficits.  Gait and station fluid.  Denies bowel/ bladder dysfunction.  Full range of motion of the upper and lower extremities. No pain with axial facet loading.  No SI joint pain on palpation.  No pain with lumbar flexion/ extension.    No imaging.    Ms. Choudhary has acute onset right lower back and radicular leg pain - possible L5, S1 radiculopathy with no neurological deficits.  To help with acute pain, we will try IM toradol 60mg (last dose aleve greater than 12 hours ago), zanaflex at night and start medrol taper tomorrow.  To help more long term, I recommend PT.  She is agreeable to plan.  Follow up in 4 weeks to monitor progress.    Visit Diagnosis:  Lumbar radiculopathy  -     ketorolac injection 30 mg  -     methylPREDNISolone (MEDROL, NASIMA,) 4 mg tablet; use as directed.  Start 7/19/2022  Dispense: 1 each; Refill: 0  -     tiZANidine (ZANAFLEX) 4 MG tablet; Take 1 tablet (4 mg total) by mouth nightly as needed (muscle spasms/ pain).  Dispense: 40 tablet; Refill: 0  -     ketorolac injection 30 mg  -     Ambulatory referral/consult to Physical/Occupational Therapy; Future; Expected date: 07/25/2022        Total time spent counseling greater than fifty percent of total visit time.  Counseling included discussion regarding imaging findings, diagnosis possibilities, treatment options, risks and benefits.   The patient had many questions regarding the options and long-term effects.

## 2022-07-22 ENCOUNTER — CLINICAL SUPPORT (OUTPATIENT)
Dept: REHABILITATION | Facility: HOSPITAL | Age: 57
End: 2022-07-22
Payer: COMMERCIAL

## 2022-07-22 DIAGNOSIS — M54.41 CHRONIC BILATERAL LOW BACK PAIN WITH RIGHT-SIDED SCIATICA: ICD-10-CM

## 2022-07-22 DIAGNOSIS — G89.29 CHRONIC BILATERAL LOW BACK PAIN WITH RIGHT-SIDED SCIATICA: ICD-10-CM

## 2022-07-22 DIAGNOSIS — M54.16 LUMBAR RADICULOPATHY: ICD-10-CM

## 2022-07-22 DIAGNOSIS — R29.898 WEAKNESS OF BOTH LOWER EXTREMITIES: ICD-10-CM

## 2022-07-22 DIAGNOSIS — M53.86 DECREASED RANGE OF MOTION OF LUMBAR SPINE: ICD-10-CM

## 2022-07-22 PROCEDURE — 97110 THERAPEUTIC EXERCISES: CPT | Mod: PO

## 2022-07-22 PROCEDURE — 97161 PT EVAL LOW COMPLEX 20 MIN: CPT | Mod: PO

## 2022-07-22 NOTE — PLAN OF CARE
[facility]  Physical Therapy Initial Evaluation    Name: Funmi Houston  Clinic Number: [id number]    Therapy Diagnosis:   Encounter Diagnoses   Name Primary?    Lumbar radiculopathy     Chronic bilateral low back pain with right-sided sciatica     Decreased range of motion of lumbar spine     Weakness of both lower extremities      Physician: Carol Santizo PA-C    Physician Orders: PT Eval and Treat   Medical Diagnosis from Referral: M54.16 (ICD-10-CM) - Lumbar radiculopathy   Evaluation Date: 7/22/2022  Authorization Period Expiration: 07/18/2023   Plan of Care Expiration: 9/30/22  Visit # / Visits authorized: 1/ 1    Time In: 7:10  Time Out: 7:45  Total Billable Time: 10 minutes    Precautions: Standard    Subjective   Date of onset: May 2022  History of current condition - Funmi reports: Pt reports that she had this pain first about 2 months ago, it went away and then came back about 3 weeks ago. She had an injection on Monday and has had a good reduction in symptoms. She currently has a shooting pain in her right leg and it goes down to her calf. Since the injection, this has gotten better,but she is still having it. This pain was waking her from her up sleep, but not any longer since the injection. She notices that when she has to bend over, such as when brushing her teeth, it makes her pain worse.      Medical History:   Past Medical History:   Diagnosis Date    Edema     OAB (overactive bladder)     Restless leg syndrome        Surgical History:   Funmi Houston  has a past surgical history that includes Cholecystectomy; Total vaginal hysterectomy; Tonsillectomy; Esophagogastroduodenoscopy; Esophagogastroduodenoscopy (N/A, 11/30/2020); and Colonoscopy (N/A, 11/30/2020).    Medications:   Funmi has a current medication list which includes the following prescription(s): estradiol, famotidine, methylprednisolone, multivitamin, myrbetriq, omeprazole, tizanidine, and  valacyclovir.    Allergies:   Review of patient's allergies indicates:   Allergen Reactions    Penicillins Hives and Rash        Imaging, none    Prior Therapy: Yes, for the back, but something different  Social History: Pt lives with their spouse  Occupation:   Prior Level of Function: Independent in all ADLs  Current Level of Function: Remains independent    Pain:  Current 4/10, worst 10/10, best 0/10   Location: right leg   Description: Aching and Shooting  Aggravating Factors: Sitting and Bending  Easing Factors: walking    Pts goals: Reduce the pain    Red Flag Screening:   Cough  Sneeze  Strain: (+)  Bladder/ bowel: (--)  Falls: (--)  Night pain: (+)  Unexplained weight loss: (--)  General health: No signs of distress    Objective     Observation: Decreased lumbar ROM, pain only with flexion in standing. (+) for right sided sciatic irritation. Good tolerance to flexion and extension in lying position.     Lumbar Range of Motion:    % Limitation Pain   Flexion 25   Yes        Extension 0   No        Left Side Bending 0 No        Right Side Bending 0 No        Left rotation   0 No        Right Rotation   0 No             Lower Extremity Strength  Right LE  Left LE    Knee extension: 5/5 Knee extension: 5/5   Knee flexion: 5/5 Knee flexion: 5/5   Hip flexion: 5/5 Hip flexion: 5/5   Hip extension:  4+/5 Hip extension: 5/5   Hip abduction: 4/5 Hip abduction: 5/5   Ankle dorsiflexion: 5/5 Ankle dorsiflexion: 5/5   Ankle plantarflexion: 5/5 Ankle plantarflexion: 5/5       Special Tests:  REPEATED TEST MOVEMENTS:  Repeated Flexion in Standing worse   Repeated Extension in Standing better   Repeated Flexion in lying no effect   Repeated Extension in lying  better     - CAROL (-) B  - FADIR (-) B  - SCOUR (-) B    Neuro Dynamic Testing:    Sciatic nerve:      SLR: R = (+)     L = (-)    Joint Mobility: hypomobility of lumbar spine    Palpation: TTP along piriformis of RLE    Sensation: Intact to light  touch from L2-S1 of BLE    Flexibility: Decreased hamstring flexibility of RLE      CMS Impairment/Limitation/Restriction for FOTO LUMBAR SPINE Survey    Therapist reviewed FOTO scores for Funmi Houston on 7/22/2022.   FOTO documents entered into [employer] - see Media section.    Limitation Score: 51%  Category: Mobility         TREATMENT   Treatment Time In: 7:30  Treatment Time Out: 7:40  Total Treatment time separate from Evaluation: 10 minutes    Funmi received therapeutic exercises to develop strength, ROM, flexibility and posture for 10 minutes including:  Seated hamstring stretch, 1x 30s B  Prone lying, 2 mins  Prone on elbows, 2 mins  Prone press up, 1x10  Supine piriformis stretch, 1x 30s B  Supine figure 4 stretch, 1x 30s B  Standing lumbar ext, 1x10    Home Exercises and Patient Education Provided    Education provided:   - Role of PT, PT POC, PT diagnosis, PT prognosis, HEP, begin with extension based program    Written Home Exercises Provided: yes.  Exercises were reviewed and Funmi was able to demonstrate them prior to the end of the session.  Funmi demonstrated good  understanding of the education provided.     See EMR under Patient Instructions for exercises provided 7/22/2022.    Assessment   Funmi is a 56 y.o. female referred to outpatient Physical Therapy with a medical diagnosis of M54.16 (ICD-10-CM) - Lumbar radiculopathy. Physical exam is consistent with lumbar dysfunction. Primary impairments include postural dysfunction, decreased core and LE strength, decreased lumbar and LE flexibility and mobility, impaired neuromuscular control of core and hip musculature and pain with functional activities. This pt is an good candidate for skilled PT tx and stands to benefit from a combination of manual therapy including joint mobilizations with trigger point/myofacscial release, therapeutic exercise to establish core/joint stability, neuromuscular re-education, dry needling, and modalities Prn.  The pt has been educated on their dx/POC and consents to further PT tx.     Pt prognosis is Good.   Pt will benefit from skilled outpatient Physical Therapy to address the deficits stated above and in the chart below, provide pt/family education, and to maximize pt's level of independence.     Plan of care discussed with patient: Yes  Pt's spiritual, cultural and educational needs considered and patient is agreeable to the plan of care and goals as stated below:     Anticipated Barriers for therapy: None    Medical Necessity is demonstrated by the following  History  Co-morbidities and personal factors that may impact the plan of care Co-morbidities:   high BMI    Personal Factors:   no deficits     low   Examination  Body Structures and Functions, activity limitations and participation restrictions that may impact the plan of care Body Regions:   back  lower extremities    Body Systems:    ROM  strength  motor control    Participation Restrictions:   None    Activity limitations:   Learning and applying knowledge  no deficits    General Tasks and Commands  no deficits    Communication  no deficits    Mobility  lifting and carrying objects    Self care  caring for body parts (brushing teeth, shaving, grooming)    Domestic Life  cooking  doing house work (cleaning house, washing dishes, laundry)    Interactions/Relationships  no deficits    Life Areas  no deficits    Community and Social Life  recreation and leisure         moderate   Clinical Presentation stable and uncomplicated low   Decision Making/ Complexity Score: low     Goals:   Short Term Goals (4 Weeks):   1) Pt will demonstrate compliance with initial home exercise program as prescribed by physical therapist to improve independence with management of condition.  2) Pt to improve active range of motion in lumbar spine to 0% limitations to allow for improved functional mobility.  3) Pt to report low back pain of <5/10 at worst during prolonged standing to  improve tolerance to ADLs.    Long Term Goals (8 Weeks):   1) Pt to achieve <33% limitation as measured by the FOTO to demonstrate decreased low back pain disability.  2) Pt to increase strength to at least 5/5 of muscles tested to allow for improvement in functional activities such as performing chores.  3) Pt to report low back pain of <3/10 at worst during prolonged standing to improve tolerance to ADLs.    Plan   Plan of care Certification: 7/22/2022 to 9/30/22.    Outpatient Physical Therapy 2 times weekly for 8 weeks to include the following interventions: Aquatic Therapy, Cervical/Lumbar Traction, Electrical Stimulation  , Manual Therapy, Moist Heat/ Ice, Neuromuscular Re-ed, Patient Education, Therapeutic Activities and Therapeutic Exercise.     Sylvain Ahumada, PT

## 2022-07-24 PROBLEM — R29.898 WEAKNESS OF BOTH LOWER EXTREMITIES: Status: ACTIVE | Noted: 2022-07-24

## 2022-07-24 PROBLEM — M53.86 DECREASED RANGE OF MOTION OF LUMBAR SPINE: Status: ACTIVE | Noted: 2022-07-24

## 2022-07-24 PROBLEM — G89.29 CHRONIC BILATERAL LOW BACK PAIN WITH RIGHT-SIDED SCIATICA: Status: ACTIVE | Noted: 2022-07-24

## 2022-07-24 PROBLEM — M54.41 CHRONIC BILATERAL LOW BACK PAIN WITH RIGHT-SIDED SCIATICA: Status: ACTIVE | Noted: 2022-07-24

## 2022-07-25 ENCOUNTER — CLINICAL SUPPORT (OUTPATIENT)
Dept: REHABILITATION | Facility: HOSPITAL | Age: 57
End: 2022-07-25
Payer: COMMERCIAL

## 2022-07-25 DIAGNOSIS — R29.898 WEAKNESS OF BOTH LOWER EXTREMITIES: ICD-10-CM

## 2022-07-25 DIAGNOSIS — G89.29 CHRONIC BILATERAL LOW BACK PAIN WITH RIGHT-SIDED SCIATICA: Primary | ICD-10-CM

## 2022-07-25 DIAGNOSIS — M54.41 CHRONIC BILATERAL LOW BACK PAIN WITH RIGHT-SIDED SCIATICA: Primary | ICD-10-CM

## 2022-07-25 DIAGNOSIS — M53.86 DECREASED RANGE OF MOTION OF LUMBAR SPINE: ICD-10-CM

## 2022-07-25 PROCEDURE — 97110 THERAPEUTIC EXERCISES: CPT | Mod: PO,CQ

## 2022-07-25 NOTE — PROGRESS NOTES
Physical Therapy Daily Treatment Note     Name: Funmi Houston  Clinic Number: [id number]    Therapy Diagnosis:   Encounter Diagnoses   Name Primary?    Chronic bilateral low back pain with right-sided sciatica Yes    Decreased range of motion of lumbar spine     Weakness of both lower extremities      Physician: Carol Santizo PA-C    Visit Date: 7/25/2022    Physician Orders: PT Eval and Treat   Medical Diagnosis from Referral: M54.16 (ICD-10-CM) - Lumbar radiculopathy   Evaluation Date: 7/22/2022  Authorization Period Expiration: 12/31/2022  Plan of Care Expiration: 9/30/22  Visit # / Visits authorized: 1/ 20     Time In: 4:49 pm  Time Out: 5:32 pm  Total Billable Time: 43 minutes     Precautions: Standard      Subjective     Pt reports: Started having increased pain Saturday night and Sunday but today is the worst.  Pt reports she fought through the pain to do the exercises last night and this didn't change the pain.  Pt reports the pain in the R leg is less than previously and pain is mid posterior thigh instead of down the calf.  Pt goes back to [facility] on 8/22/2022.  She was compliant with home exercise program.    Response to previous treatment: no adverse affects  Functional change: too soon to tell    Pain: 8/10  Location: Middle of low back    Objective        Observation: pt amb into treatment wearing boots      Funmi received therapeutic exercises to develop strength, ROM, flexibility and posture for 38 minutes including:    Seated hamstring stretch, 1x 30s B  Prone lying, 3 mins  Prone on elbows, 3 mins- pain lessened to 5/10,  2 min( after press ups)  Prone press up, 1x10- towards 7th rep, sent sharp pain in the tailbone  R Supine piriformis stretch, 3x 30s  R Supine figure 4 stretch, 3x 30s   Standing lumbar ext, 1x10  Supine sciatic nerve glide, 1x10  Supine sciatic nerve oscillation, 1x10( sets of 3)  Seated nerve glides x 20        Funmi received the following manual therapy  techniques: Soft tissue Mobilization were applied to the: R glut for 5  minutes, including:  Massage roller to R glut      Home Exercises Provided and Patient Education Provided     Education provided:   - Wearing more supportive shoes without heel at work  - performing prone on elbows as needed for pain  - centralization of pain    Written Home Exercises Provided: yes.  Exercises were reviewed and Funmi was able to demonstrate them prior to the end of the session.  Funmi demonstrated good  understanding of the education provided.     See EMR under Patient Instructions for exercises provided 7/25/2022.    Assessment     Pt reported a decrease in back pain, 5/10, following treatment.  Pt's most predominant pain today was in the middle of her low back in the tailbone.  Pt reported decreasing symptoms with prone on elbows but sharp pain with the last three reps of prone press ups.  Pt with good response to sciatic nerve glides.  Will assess response to extension based exercises and progress as tolerated.    Funmi Is progressing well towards her goals.   Pt prognosis is Good.     Pt will continue to benefit from skilled outpatient physical therapy to address the deficits listed in the problem list box on initial evaluation, provide pt/family education and to maximize pt's level of independence in the home and community environment.     Pt's spiritual, cultural and educational needs considered and pt agreeable to plan of care and goals.    Anticipated barriers to physical therapy: None       Goals:     Short Term Goals (4 Weeks):   1) Pt will demonstrate compliance with initial home exercise program as prescribed by physical therapist to improve independence with management of condition.  2) Pt to improve active range of motion in lumbar spine to 0% limitations to allow for improved functional mobility.  3) Pt to report low back pain of <5/10 at worst during prolonged standing to improve tolerance to ADLs.      Long Term Goals (8 Weeks):   1) Pt to achieve <33% limitation as measured by the FOTO to demonstrate decreased low back pain disability.  2) Pt to increase strength to at least 5/5 of muscles tested to allow for improvement in functional activities such as performing chores.  3) Pt to report low back pain of <3/10 at worst during prolonged standing to improve tolerance to ADLs.         Plan     Plan of care Certification: 7/22/2022 to 9/30/22.         Joyce Parikh, JUAN

## 2022-08-01 ENCOUNTER — PATIENT MESSAGE (OUTPATIENT)
Dept: PAIN MEDICINE | Facility: CLINIC | Age: 57
End: 2022-08-01

## 2022-08-01 DIAGNOSIS — M54.16 LUMBAR RADICULOPATHY, CHRONIC: ICD-10-CM

## 2022-08-01 DIAGNOSIS — M54.16 LUMBAR RADICULOPATHY: Primary | ICD-10-CM

## 2022-08-01 NOTE — TELEPHONE ENCOUNTER
Spoke with patient.  Medications and IM toradol helped initially.  She has been going to PT. She she did therapy more, the pain recurred and is wores.  Continues into the lower back and right leg.    I recommend MRI lumbar spine to consider JOSE.    Please schedule MRI and follow up appt in clinic after imaging.

## 2022-08-01 NOTE — TELEPHONE ENCOUNTER
I spoke with the patient and scheduled her for an MRI 8-6-22 and a follow-up with Carol Santizo for results 8-8-22, she indicated understanding.

## 2022-08-04 ENCOUNTER — PATIENT MESSAGE (OUTPATIENT)
Dept: PAIN MEDICINE | Facility: CLINIC | Age: 57
End: 2022-08-04

## 2022-08-09 DIAGNOSIS — M54.16 LUMBAR RADICULOPATHY: ICD-10-CM

## 2022-08-09 RX ORDER — TIZANIDINE 4 MG/1
4 TABLET ORAL NIGHTLY PRN
Qty: 30 TABLET | Refills: 0 | Status: SHIPPED | OUTPATIENT
Start: 2022-08-09 | End: 2022-09-01

## 2022-08-12 ENCOUNTER — OFFICE VISIT (OUTPATIENT)
Dept: PAIN MEDICINE | Facility: CLINIC | Age: 57
End: 2022-08-12
Payer: COMMERCIAL

## 2022-08-12 VITALS
SYSTOLIC BLOOD PRESSURE: 140 MMHG | DIASTOLIC BLOOD PRESSURE: 73 MMHG | HEIGHT: 68 IN | WEIGHT: 263.88 LBS | BODY MASS INDEX: 39.99 KG/M2 | HEART RATE: 74 BPM

## 2022-08-12 DIAGNOSIS — M51.26 LUMBAR DISC HERNIATION: ICD-10-CM

## 2022-08-12 DIAGNOSIS — M54.16 LUMBAR RADICULOPATHY: Primary | ICD-10-CM

## 2022-08-12 PROCEDURE — 3077F SYST BP >= 140 MM HG: CPT | Mod: CPTII,S$GLB,, | Performed by: PHYSICIAN ASSISTANT

## 2022-08-12 PROCEDURE — 1160F RVW MEDS BY RX/DR IN RCRD: CPT | Mod: CPTII,S$GLB,, | Performed by: PHYSICIAN ASSISTANT

## 2022-08-12 PROCEDURE — 1159F MED LIST DOCD IN RCRD: CPT | Mod: CPTII,S$GLB,, | Performed by: PHYSICIAN ASSISTANT

## 2022-08-12 PROCEDURE — 1159F PR MEDICATION LIST DOCUMENTED IN MEDICAL RECORD: ICD-10-PCS | Mod: CPTII,S$GLB,, | Performed by: PHYSICIAN ASSISTANT

## 2022-08-12 PROCEDURE — 99999 PR PBB SHADOW E&M-EST. PATIENT-LVL III: CPT | Mod: PBBFAC,,, | Performed by: PHYSICIAN ASSISTANT

## 2022-08-12 PROCEDURE — 99214 PR OFFICE/OUTPT VISIT, EST, LEVL IV, 30-39 MIN: ICD-10-PCS | Mod: S$GLB,,, | Performed by: PHYSICIAN ASSISTANT

## 2022-08-12 PROCEDURE — 3077F PR MOST RECENT SYSTOLIC BLOOD PRESSURE >= 140 MM HG: ICD-10-PCS | Mod: CPTII,S$GLB,, | Performed by: PHYSICIAN ASSISTANT

## 2022-08-12 PROCEDURE — 3008F PR BODY MASS INDEX (BMI) DOCUMENTED: ICD-10-PCS | Mod: CPTII,S$GLB,, | Performed by: PHYSICIAN ASSISTANT

## 2022-08-12 PROCEDURE — 99999 PR PBB SHADOW E&M-EST. PATIENT-LVL III: ICD-10-PCS | Mod: PBBFAC,,, | Performed by: PHYSICIAN ASSISTANT

## 2022-08-12 PROCEDURE — 3078F DIAST BP <80 MM HG: CPT | Mod: CPTII,S$GLB,, | Performed by: PHYSICIAN ASSISTANT

## 2022-08-12 PROCEDURE — 99214 OFFICE O/P EST MOD 30 MIN: CPT | Mod: S$GLB,,, | Performed by: PHYSICIAN ASSISTANT

## 2022-08-12 PROCEDURE — 3078F PR MOST RECENT DIASTOLIC BLOOD PRESSURE < 80 MM HG: ICD-10-PCS | Mod: CPTII,S$GLB,, | Performed by: PHYSICIAN ASSISTANT

## 2022-08-12 PROCEDURE — 1160F PR REVIEW ALL MEDS BY PRESCRIBER/CLIN PHARMACIST DOCUMENTED: ICD-10-PCS | Mod: CPTII,S$GLB,, | Performed by: PHYSICIAN ASSISTANT

## 2022-08-12 PROCEDURE — 3008F BODY MASS INDEX DOCD: CPT | Mod: CPTII,S$GLB,, | Performed by: PHYSICIAN ASSISTANT

## 2022-08-12 NOTE — PROGRESS NOTES
Back and Spine Follow Up    Patient ID: Funmi Houston is a 56 y.o. female.    Chief Complaint   Patient presents with    Follow-up     MRI results for hip, leg, and lbp.       Review of Systems   Constitutional: Negative for activity change, appetite change, chills, fever and unexpected weight change.   HENT: Negative for tinnitus, trouble swallowing and voice change.    Respiratory: Negative for apnea, cough, chest tightness and shortness of breath.    Cardiovascular: Negative for chest pain and palpitations.   Gastrointestinal: Negative for constipation, diarrhea, nausea and vomiting.   Genitourinary: Negative for difficulty urinating, dysuria, frequency and urgency.   Musculoskeletal: Positive for back pain and myalgias. Negative for gait problem, neck pain and neck stiffness.   Skin: Negative for wound.   Neurological: Negative for dizziness, tremors, seizures, facial asymmetry, speech difficulty, weakness, light-headedness, numbness and headaches.   Psychiatric/Behavioral: Negative for confusion and decreased concentration.       Past Medical History:   Diagnosis Date    Edema     OAB (overactive bladder)     Restless leg syndrome      Social History     Socioeconomic History    Marital status:    Tobacco Use    Smoking status: Never Smoker    Smokeless tobacco: Never Used   Substance and Sexual Activity    Alcohol use: Yes     Comment: seldom    Drug use: No   Social History Narrative        2 children         Family History   Problem Relation Age of Onset    Macular degeneration Other     Diabetes Mother     Kidney disease Mother     Stroke Mother     Heart disease Mother     Diabetes Father     Stroke Father     Heart disease Father     Macular degeneration Father     Glaucoma Father     Diabetes Brother     Heart disease Brother     Macular degeneration Brother     Macular degeneration Sister     Macular degeneration Brother      Review of patient's  "allergies indicates:   Allergen Reactions    Penicillins Hives and Rash       Current Outpatient Medications:     estradiol (ESTRACE) 0.5 MG tablet, Take 0.5 mg by mouth once daily.  , Disp: , Rfl:     famotidine (PEPCID) 40 MG tablet, TAKE 1 TABLET (40 MG TOTAL) BY MOUTH NIGHTLY AS NEEDED FOR HEARTBURN., Disp: 90 tablet, Rfl: 3    multivitamin (THERAGRAN) per tablet, Take 1 tablet by mouth once daily., Disp: , Rfl:     MYRBETRIQ 25 mg Tb24 ER tablet, Take 25 mg by mouth once daily., Disp: , Rfl:     omeprazole (PRILOSEC) 40 MG capsule, TAKE 1 CAPSULE BY MOUTH EVERY DAY, Disp: 90 capsule, Rfl: 3    tiZANidine (ZANAFLEX) 4 MG tablet, Take 1 tablet (4 mg total) by mouth nightly as needed (muscle spasms/ pain)., Disp: 30 tablet, Rfl: 0    valACYclovir (VALTREX) 1000 MG tablet, TAKE 1 TABLET (1,000 MG TOTAL) BY MOUTH EVERY 12 (TWELVE) HOURS., Disp: 30 tablet, Rfl: 5    Vitals:    08/12/22 1436   BP: (!) 140/73   BP Location: Right arm   Patient Position: Sitting   BP Method: Large (Automatic)   Pulse: 74   Weight: 119.7 kg (263 lb 14.3 oz)   Height: 5' 8" (1.727 m)       Physical Exam  Vitals and nursing note reviewed.   Constitutional:       Appearance: She is well-developed.   HENT:      Head: Normocephalic and atraumatic.   Eyes:      Pupils: Pupils are equal, round, and reactive to light.   Cardiovascular:      Rate and Rhythm: Normal rate.   Pulmonary:      Effort: Pulmonary effort is normal.   Musculoskeletal:         General: Normal range of motion.      Cervical back: Normal range of motion and neck supple.   Skin:     General: Skin is warm and dry.   Neurological:      Mental Status: She is alert and oriented to person, place, and time.      Coordination: Finger-Nose-Finger Test, Heel to Shin Test and Romberg Test normal.      Gait: Gait is intact. Tandem walk normal.      Deep Tendon Reflexes:      Reflex Scores:       Tricep reflexes are 2+ on the right side and 2+ on the left side.       Bicep " reflexes are 2+ on the right side and 2+ on the left side.       Brachioradialis reflexes are 2+ on the right side and 2+ on the left side.       Patellar reflexes are 2+ on the right side and 2+ on the left side.       Achilles reflexes are 2+ on the right side and 2+ on the left side.  Psychiatric:         Speech: Speech normal.         Behavior: Behavior normal.         Thought Content: Thought content normal.         Judgment: Judgment normal.         Neurologic Exam     Mental Status   Oriented to person, place, and time.   Oriented to person.   Oriented to place.   Oriented to time.   Follows 3 step commands.   Attention: normal. Concentration: normal.   Speech: speech is normal   Level of consciousness: alert  Knowledge: consistent with education.   Able to name object. Able to read. Able to repeat. Able to write. Normal comprehension.     Cranial Nerves     CN II   Visual acuity: normal  Right visual field deficit: none  Left visual field deficit: none     CN III, IV, VI   Pupils are equal, round, and reactive to light.  Right pupil: Size: 3 mm. Shape: regular. Reactivity: brisk. Consensual response: intact.   Left pupil: Size: 3 mm. Shape: regular. Reactivity: brisk. Consensual response: intact.   CN III: no CN III palsy  CN VI: no CN VI palsy  Nystagmus: none   Diplopia: none  Ophthalmoparesis: none  Conjugate gaze: present    CN V   Right facial sensation deficit: none  Left facial sensation deficit: none    CN VII   Right facial weakness: none  Left facial weakness: none    CN VIII   Hearing: intact    CN IX, X   CN IX normal.   CN X normal.     CN XI   Right sternocleidomastoid strength: normal  Left sternocleidomastoid strength: normal  Right trapezius strength: normal  Left trapezius strength: normal    CN XII   Fasciculations: absent  Tongue deviation: none    Motor Exam   Muscle bulk: normal  Overall muscle tone: normal  Right arm pronator drift: absent  Left arm pronator drift: absent    Strength    Right neck flexion: 5/5  Left neck flexion: 5/5  Right neck extension: 5/5  Left neck extension: 5/5  Right deltoid: 5/5  Left deltoid: 5/5  Right biceps: 5/5  Left biceps: 5/5  Right triceps: 5/5  Left triceps: 5/5  Right wrist flexion: 5/5  Left wrist flexion: 5/  Right wrist extension: 5/  Left wrist extension:   Right interossei: 5  Left interossei: /  Right abdominals: 5/  Left abdominals: 5/  Right iliopsoas: 5  Left iliopsoas:   Right quadriceps:   Left quadriceps:   Right hamstrin/5  Left hamstrin/5  Right glutei:   Left glutei:   Right anterior tibial:   Left anterior tibial:   Right posterior tibial:   Left posterior tibial:   Right peroneal:   Left peroneal:   Right gastroc:   Left gastroc:     Sensory Exam   Right arm light touch: normal  Left arm light touch: normal  Right leg light touch: normal  Left leg light touch: normal  Right arm vibration: normal  Left arm vibration: normal  Right arm pinprick: normal  Left arm pinprick: normal    Gait, Coordination, and Reflexes     Gait  Gait: normal    Coordination   Romberg: negative  Finger to nose coordination: normal  Heel to shin coordination: normal  Tandem walking coordination: normal    Tremor   Resting tremor: absent  Intention tremor: absent  Action tremor: absent    Reflexes   Right brachioradialis: 2+  Left brachioradialis: 2+  Right biceps: 2+  Left biceps: 2+  Right triceps: 2+  Left triceps: 2+  Right patellar: 2+  Left patellar: 2+  Right achilles: 2+  Left achilles: 2+  Right Drummond: absent  Left Drummond: absent  Right ankle clonus: absent  Left ankle clonus: absent      Provider dictation:    56 year old female with restless legs and GERD presents for follow up of back and leg pain.  She first had pain 3 months ago that resolved, but recurred 2 monthss ago without trauma.  She has pain in the right lower back that has progressed to also include with right buttock, leg and calf.   She has intermittent shooting pain in the right buttock and a constant pain in the right posterior thigh/ knee. She has muscle spasms in the calf.  No current numbness or tingling.  Pain improves in the pool.  No specific aggravating factors.    IM toradol, medrol taper and zanaflex seemed to have have helped for a few days initially.  She has tried 2 visits of PT with worsening pain.  Pain persists in the same area as described above.    Current medications:  Aleve, zanaflex  Medications tried:  Ibuprofen, percocet, IM toradol (7-18-22), medrol taper (started 7/18/22)  Physical therapy:  2 visits  Interventional Procedures:  none     On exam, there is 5/5 strength with 2+ DTR and no sensory deficits.  Gait and station fluid.  Denies bowel/ bladder dysfunction.  Full range of motion of the upper and lower extremities. No pain with axial facet loading.  No SI joint pain on palpation.  No pain with lumbar flexion/ extension.    MRI lumbar spine from 8/9/22 reviewed.  There is L4/5 facet hypertrophy and minimal facet effusion.  The greatest finding is L5/S1 right paracentral disk hernia with caudal migration causing right lateral recess stenosis.    Ms. Choudhary has acute onset right lower back and right L5, S1 radicular leg pain due to right L5/S1 lateral recess dis hernia. She has tried medications and PT.  Pain persists.  Recommend right L5/S1 transforaminal JOSE.  Risks/ benefits discussed.  She would like to proceed.  Follow up in clinic after injection.  If no improvement/ lasting benefit then would refer to neurosurgery for surgical intervention.  She is agreeable.  If pain worsens or develops bowel/ bladder loss of control then report to ER.    Visit Diagnosis:  Lumbar radiculopathy  -     Procedure Order to Pain Management; Future; Expected date: 08/12/2022    Lumbar disc herniation  -     Procedure Order to Pain Management; Future; Expected date: 08/12/2022        Total time spent counseling greater than fifty  percent of total visit time.  Counseling included discussion regarding imaging findings, diagnosis possibilities, treatment options, risks and benefits.   The patient had many questions regarding the options and long-term effects.

## 2022-08-17 ENCOUNTER — PATIENT MESSAGE (OUTPATIENT)
Dept: PAIN MEDICINE | Facility: CLINIC | Age: 57
End: 2022-08-17

## 2022-08-18 NOTE — TELEPHONE ENCOUNTER
Please call patient to schedule injection.  Direct to procedure order was placed last week.    Thanks  Carol

## 2022-08-19 ENCOUNTER — TELEPHONE (OUTPATIENT)
Dept: PAIN MEDICINE | Facility: CLINIC | Age: 57
End: 2022-08-19

## 2022-08-19 DIAGNOSIS — M54.16 LUMBAR RADICULOPATHY: Primary | ICD-10-CM

## 2022-08-19 RX ORDER — ALPRAZOLAM 0.5 MG/1
1 TABLET, ORALLY DISINTEGRATING ORAL ONCE AS NEEDED
Status: CANCELLED | OUTPATIENT
Start: 2022-09-02 | End: 2034-01-28

## 2022-08-19 NOTE — TELEPHONE ENCOUNTER
Call placed to Pt to schedule with Dr. Cronin for a right,  Lumbar Transforaminal Epidural  L5/S1, with a 2 week f/u. Procedure scheduled for 9-2-22.   F/U scheduled for 9-28-22.

## 2022-08-19 NOTE — TELEPHONE ENCOUNTER
----- Message from Jethro Cronin MD sent at 8/17/2022  4:31 PM CDT -----  Type of Procedure/Injection - Lumbar Transforaminal Epidural  L5/S1           Laterality - Right      Type of Sedation - Local      Need to hold medication - no      N/A      Clearance needed - no      Follow up - 2 week

## 2022-08-22 ENCOUNTER — PATIENT MESSAGE (OUTPATIENT)
Dept: PAIN MEDICINE | Facility: CLINIC | Age: 57
End: 2022-08-22

## 2022-08-22 DIAGNOSIS — M51.26 LUMBAR DISC HERNIATION: ICD-10-CM

## 2022-08-22 DIAGNOSIS — M54.16 LUMBAR RADICULOPATHY: Primary | ICD-10-CM

## 2022-08-25 ENCOUNTER — OFFICE VISIT (OUTPATIENT)
Dept: NEUROSURGERY | Facility: CLINIC | Age: 57
End: 2022-08-25
Payer: COMMERCIAL

## 2022-08-25 VITALS
WEIGHT: 263 LBS | BODY MASS INDEX: 39.86 KG/M2 | DIASTOLIC BLOOD PRESSURE: 84 MMHG | SYSTOLIC BLOOD PRESSURE: 148 MMHG | HEART RATE: 85 BPM | HEIGHT: 68 IN

## 2022-08-25 DIAGNOSIS — M51.26 LUMBAR DISC HERNIATION: ICD-10-CM

## 2022-08-25 DIAGNOSIS — M54.16 LUMBAR RADICULOPATHY: ICD-10-CM

## 2022-08-25 PROCEDURE — 3079F DIAST BP 80-89 MM HG: CPT | Mod: CPTII,S$GLB,, | Performed by: STUDENT IN AN ORGANIZED HEALTH CARE EDUCATION/TRAINING PROGRAM

## 2022-08-25 PROCEDURE — 1159F PR MEDICATION LIST DOCUMENTED IN MEDICAL RECORD: ICD-10-PCS | Mod: CPTII,S$GLB,, | Performed by: STUDENT IN AN ORGANIZED HEALTH CARE EDUCATION/TRAINING PROGRAM

## 2022-08-25 PROCEDURE — 3008F PR BODY MASS INDEX (BMI) DOCUMENTED: ICD-10-PCS | Mod: CPTII,S$GLB,, | Performed by: STUDENT IN AN ORGANIZED HEALTH CARE EDUCATION/TRAINING PROGRAM

## 2022-08-25 PROCEDURE — 3077F SYST BP >= 140 MM HG: CPT | Mod: CPTII,S$GLB,, | Performed by: STUDENT IN AN ORGANIZED HEALTH CARE EDUCATION/TRAINING PROGRAM

## 2022-08-25 PROCEDURE — 1159F MED LIST DOCD IN RCRD: CPT | Mod: CPTII,S$GLB,, | Performed by: STUDENT IN AN ORGANIZED HEALTH CARE EDUCATION/TRAINING PROGRAM

## 2022-08-25 PROCEDURE — 3008F BODY MASS INDEX DOCD: CPT | Mod: CPTII,S$GLB,, | Performed by: STUDENT IN AN ORGANIZED HEALTH CARE EDUCATION/TRAINING PROGRAM

## 2022-08-25 PROCEDURE — 3079F PR MOST RECENT DIASTOLIC BLOOD PRESSURE 80-89 MM HG: ICD-10-PCS | Mod: CPTII,S$GLB,, | Performed by: STUDENT IN AN ORGANIZED HEALTH CARE EDUCATION/TRAINING PROGRAM

## 2022-08-25 PROCEDURE — 99214 OFFICE O/P EST MOD 30 MIN: CPT | Mod: S$GLB,,, | Performed by: STUDENT IN AN ORGANIZED HEALTH CARE EDUCATION/TRAINING PROGRAM

## 2022-08-25 PROCEDURE — 3077F PR MOST RECENT SYSTOLIC BLOOD PRESSURE >= 140 MM HG: ICD-10-PCS | Mod: CPTII,S$GLB,, | Performed by: STUDENT IN AN ORGANIZED HEALTH CARE EDUCATION/TRAINING PROGRAM

## 2022-08-25 PROCEDURE — 99214 PR OFFICE/OUTPT VISIT, EST, LEVL IV, 30-39 MIN: ICD-10-PCS | Mod: S$GLB,,, | Performed by: STUDENT IN AN ORGANIZED HEALTH CARE EDUCATION/TRAINING PROGRAM

## 2022-08-25 NOTE — PROGRESS NOTES
[facility]  Clinic Consult     Consult Requested By: Carol Santizo PA-C  PCP: Abelardo Burks MD    SUBJECTIVE:     Chief Complaint:   Chief Complaint   Patient presents with    Lumbar Spine Pain (L-Spine)     Patient reports low back pain radiates into the right leg; pain 6/10       History of Present Illness:  Funmi Houston is a 57 y.o. female who presents for evaluation of back and leg pain. Patient reports onset 5 years ago, but the pain has significantly worsened over the last 4 months. She was evaluated at the back and spine clinic and prescribed steroids with transient relief. The pain is present across the low back and radiates down the right leg in the S1 distribution. Currently the back pain is worse, but the leg has been more painful historically. She reports when the pain is severe she has difficulty ambulating. Denies bowel/bladder dysfunction. She was not able to complete PT due to the pain. She is scheduled for an injection but is considering cancelling.     Pertinent and recent history, provider evaluations, imaging and data reviewed in EPIC        Past Medical History:   Diagnosis Date    Edema     OAB (overactive bladder)     Restless leg syndrome      Past Surgical History:   Procedure Laterality Date    CHOLECYSTECTOMY      COLONOSCOPY N/A 11/30/2020    Procedure: COLONOSCOPY;  Surgeon: Teja Rios Jr., MD;  Location: Psychiatric;  Service: Endoscopy;  Laterality: N/A;    ESOPHAGOGASTRODUODENOSCOPY      ESOPHAGOGASTRODUODENOSCOPY N/A 11/30/2020    Procedure: ESOPHAGOGASTRODUODENOSCOPY (EGD);  Surgeon: Teja Rios Jr., MD;  Location: Psychiatric;  Service: Endoscopy;  Laterality: N/A;    TONSILLECTOMY      TOTAL VAGINAL HYSTERECTOMY       Family History   Problem Relation Age of Onset    Macular degeneration Other     Diabetes Mother     Kidney disease Mother     Stroke Mother     Heart disease Mother     Diabetes Father      Stroke Father     Heart disease Father     Macular degeneration Father     Glaucoma Father     Diabetes Brother     Heart disease Brother     Macular degeneration Brother     Macular degeneration Sister     Macular degeneration Brother      Social History     Tobacco Use    Smoking status: Never Smoker    Smokeless tobacco: Never Used   Substance Use Topics    Alcohol use: Yes     Comment: seldom    Drug use: No      Review of patient's allergies indicates:   Allergen Reactions    Penicillins Hives and Rash       Current Outpatient Medications:     estradiol (ESTRACE) 0.5 MG tablet, Take 0.5 mg by mouth once daily.  , Disp: , Rfl:     famotidine (PEPCID) 40 MG tablet, TAKE 1 TABLET (40 MG TOTAL) BY MOUTH NIGHTLY AS NEEDED FOR HEARTBURN., Disp: 90 tablet, Rfl: 3    multivitamin (THERAGRAN) per tablet, Take 1 tablet by mouth once daily., Disp: , Rfl:     MYRBETRIQ 25 mg Tb24 ER tablet, Take 25 mg by mouth once daily., Disp: , Rfl:     omeprazole (PRILOSEC) 40 MG capsule, TAKE 1 CAPSULE BY MOUTH EVERY DAY, Disp: 90 capsule, Rfl: 3    tiZANidine (ZANAFLEX) 4 MG tablet, Take 1 tablet (4 mg total) by mouth nightly as needed (muscle spasms/ pain)., Disp: 30 tablet, Rfl: 0    valACYclovir (VALTREX) 1000 MG tablet, TAKE 1 TABLET (1,000 MG TOTAL) BY MOUTH EVERY 12 (TWELVE) HOURS., Disp: 30 tablet, Rfl: 5    Review of Systems:   Constitutional: no fever, chills or night sweats. No changes in weight   Eyes: no visual changes   ENT: no nasal congestion or sore throat   Respiratory: no cough or shortness of breath   Cardiovascular: no chest pain or palpitations   Gastrointestinal: no nausea or vomiting   Genitourinary: no hematuria or dysuria   Integument/Breast: no rash or pruritis   Hematologic/Lymphatic: no easy bruising or lymphadenopathy   Musculoskeletal: +back pain   Neurological: no seizures or tremors +paresthesias   Behavioral/Psych: no auditory or visual hallucinations   Endocrine: no heat or cold  "intolerance         OBJECTIVE:     Vital Signs (Most Recent):  Pulse: 85 (08/25/22 0950)  BP: (!) 148/84 (08/25/22 0950)  Estimated body mass index is 39.99 kg/m² as calculated from the following:    Height as of this encounter: 5' 8" (1.727 m).    Weight as of this encounter: 119.3 kg (263 lb).    Physical Exam:   General: well developed, well nourished, no distress.   Neurologic: Alert and oriented. Thought content appropriate. GCS 15.   Language: No aphasia  Speech: No dysarthria  Head: normocephalic, atraumatic  Eyes: EOMI.  Neck: trachea midline, no JVD   Cardiovascular: no LE edema  Pulmonary: normal respirations, no signs of respiratory distress  Abdomen: non-distended  Sensory: intact to light touch throughout  Skin: Skin is warm, dry and intact.    Motor Strength: Moves all extremities spontaneously with good tone. No abnormal movements seen.     Strength  Deltoids Triceps Biceps Wrist Extension Wrist Flexion Hand  Interossei   Upper: R 5/5 5/5 5/5 5/5 5/5 5/5 5/5    L 5/5 5/5 5/5 5/5 5/5 5/5 5/5     Iliopsoas Quadriceps Knee  Flexion Tibialis  anterior Gastro- cnemius EHL    Lower: R 5/5 5/5 5/5 5/5 5/5 5/5     L 5/5 5/5 5/5 5/5 5/5 5/5      DTR's: 2+ patellar  0 achilles right  + SLR right 10 degree  Gait: antalgic            Able          Diagnostic Results:  I have independently reviewed the following imaging:  MRI: Reviewed  r  Lumbar MRI reviewed.  Multilevel lumbar spondylosis, appears to have very minor 1-2 mm spondylolisthesis at L3-4 without stenosis, spondylosis without mass effect at L4-5 and L5-S1 on the right there is a posterolateral disc herniation with mass effect and displacement of the ventral lateral thecal sac and traversing nerve root      ASSESSMENT/PLAN:     Lumbar radiculopathy  -     Ambulatory referral/consult to Neurosurgery    Lumbar disc herniation  -     Ambulatory referral/consult to Neurosurgery        Funmi Houston is a 57 y.o. female    Who presents today with a " history of chronic back pain.  Managed on and off.  Last 4 months she has had back pain with right S1 radiculopathy, severe pain limits ADLs motion she compensates her gait.  She drives 2 hours to work.  She is having difficult time sitting and driving secondary this.  It creates an antalgic gait.  She attempted physical therapy this exacerbated problem made her worse she had to stop.  She has been to contemplating management options and presented today.  Upon the review she is correlating image with a large lumbar disc herniation on the right at L5-S1 which is severe displacement of her ventral thecal sac and nerve root correlating with her symptoms.  We discussed the risks benefits pros and cons with 4 months, decreased patellar recent is severe ADL limitation and transient sensory deficit correlating she has indication for a lumbar microdiskectomy.  We reviewed the goals this would include a right-sided minimally invasive L5-S1 lumbar microdiskectomy.  With regard to her chronic back pain symptoms and question of a small spondylolisthesis at L3-4 will continue manages non operatively, with baseline x-rays and conservative therapy          The diagnosis, goals, limitations, risks and benefits of surgery and alternative treatment options where discussed at length (pros/cons). All questions/concerns were addressed. The patient has verbalized a good understanding of the diagnosis, the planned procedure, anticipated post-operative course, overall expectations, and risks including but not limited to: spinal cord or nerve root injury/paralysis, death, nerve injury leading to pain or neurological deficit, csf leak, vascular injury or serious bleeding/need for blood product transfusion/stroke, wrong level surgery, chronic pain/failure to improve or worsening of symptoms, infection, need for further surgery at the same or different levels; medical (eg Heart attack, blood clot, infection) and anesthetic  complications.  Patient verbalized understanding of plan. Encouraged to call with any questions or concerns.     This note was partially dictated using voice recognition software, so please excuse any errors that were not corrected.

## 2022-08-26 ENCOUNTER — TELEPHONE (OUTPATIENT)
Dept: PAIN MEDICINE | Facility: CLINIC | Age: 57
End: 2022-08-26

## 2022-08-26 NOTE — TELEPHONE ENCOUNTER
----- Message from Dale Ortiz sent at 8/26/2022  3:39 PM CDT -----  Contact: pt at [phone]  Type: Needs Medical Advice  Who Called:  pt    Best Call Back Number: [phone]    Additional Information: pt is calling the office to cancel her procedure on 9/2/22. Please call back and advise.

## 2022-08-29 DIAGNOSIS — M51.26 LUMBAR DISC HERNIATION: Primary | ICD-10-CM

## 2022-08-29 RX ORDER — SODIUM CHLORIDE, SODIUM LACTATE, POTASSIUM CHLORIDE, CALCIUM CHLORIDE 600; 310; 30; 20 MG/100ML; MG/100ML; MG/100ML; MG/100ML
INJECTION, SOLUTION INTRAVENOUS CONTINUOUS
Status: CANCELLED | OUTPATIENT
Start: 2022-08-29

## 2022-08-29 RX ORDER — LIDOCAINE HYDROCHLORIDE 10 MG/ML
1 INJECTION, SOLUTION EPIDURAL; INFILTRATION; INTRACAUDAL; PERINEURAL ONCE
Status: CANCELLED | OUTPATIENT
Start: 2022-08-29 | End: 2022-08-29

## 2022-08-31 DIAGNOSIS — M51.26 LUMBAR DISC HERNIATION: Primary | ICD-10-CM

## 2022-09-01 DIAGNOSIS — M54.16 LUMBAR RADICULOPATHY: ICD-10-CM

## 2022-09-01 RX ORDER — TIZANIDINE 4 MG/1
4 TABLET ORAL NIGHTLY PRN
Qty: 30 TABLET | Refills: 0 | Status: ON HOLD | OUTPATIENT
Start: 2022-09-01 | End: 2022-09-06 | Stop reason: SDUPTHER

## 2022-09-01 NOTE — TELEPHONE ENCOUNTER
I have refilled medication.  Any further refills should come from neurosurgery.  She is scheduled for surgery 9/6/22 with Dr. Herr.

## 2022-09-02 NOTE — TELEPHONE ENCOUNTER
Called patient to let her know Carol Santizo refilled the Tizanidine, she said she does not need the medication and she will let the pharmacy know.

## 2022-09-06 PROBLEM — M51.26 LUMBAR DISC HERNIATION: Status: ACTIVE | Noted: 2022-09-06

## 2022-09-16 DIAGNOSIS — M54.16 LUMBAR RADICULOPATHY: ICD-10-CM

## 2022-09-16 DIAGNOSIS — Z98.890 S/P LUMBAR MICRODISCECTOMY: Primary | ICD-10-CM

## 2022-09-16 RX ORDER — OXYCODONE AND ACETAMINOPHEN 5; 325 MG/1; MG/1
1 TABLET ORAL EVERY 6 HOURS PRN
Qty: 28 EACH | Refills: 0 | Status: SHIPPED | OUTPATIENT
Start: 2022-09-16 | End: 2023-10-23

## 2022-09-16 RX ORDER — GABAPENTIN 300 MG/1
300 CAPSULE ORAL 2 TIMES DAILY
Qty: 60 CAPSULE | Refills: 0 | Status: SHIPPED | OUTPATIENT
Start: 2022-09-16 | End: 2022-10-20 | Stop reason: SDUPTHER

## 2022-09-16 RX ORDER — TIZANIDINE 4 MG/1
4 TABLET ORAL EVERY 8 HOURS PRN
Qty: 30 TABLET | Refills: 0 | Status: SHIPPED | OUTPATIENT
Start: 2022-09-16 | End: 2023-10-23

## 2022-10-20 ENCOUNTER — OFFICE VISIT (OUTPATIENT)
Dept: FAMILY MEDICINE | Facility: CLINIC | Age: 57
End: 2022-10-20
Payer: COMMERCIAL

## 2022-10-20 ENCOUNTER — OFFICE VISIT (OUTPATIENT)
Dept: NEUROSURGERY | Facility: CLINIC | Age: 57
End: 2022-10-20
Payer: COMMERCIAL

## 2022-10-20 ENCOUNTER — HOSPITAL ENCOUNTER (OUTPATIENT)
Dept: RADIOLOGY | Facility: HOSPITAL | Age: 57
Discharge: HOME | End: 2022-10-20
Attending: FAMILY MEDICINE
Payer: COMMERCIAL

## 2022-10-20 VITALS
BODY MASS INDEX: 41.63 KG/M2 | RESPIRATION RATE: 18 BRPM | HEART RATE: 66 BPM | SYSTOLIC BLOOD PRESSURE: 154 MMHG | WEIGHT: 274.69 LBS | DIASTOLIC BLOOD PRESSURE: 96 MMHG | HEIGHT: 68 IN

## 2022-10-20 VITALS
HEIGHT: 68 IN | SYSTOLIC BLOOD PRESSURE: 136 MMHG | TEMPERATURE: 98 F | HEART RATE: 71 BPM | RESPIRATION RATE: 18 BRPM | OXYGEN SATURATION: 95 % | DIASTOLIC BLOOD PRESSURE: 78 MMHG | BODY MASS INDEX: 41.63 KG/M2 | WEIGHT: 274.69 LBS

## 2022-10-20 DIAGNOSIS — Z79.890 POST-MENOPAUSE ON HRT (HORMONE REPLACEMENT THERAPY): ICD-10-CM

## 2022-10-20 DIAGNOSIS — M25.511 RIGHT SHOULDER PAIN, UNSPECIFIED CHRONICITY: ICD-10-CM

## 2022-10-20 DIAGNOSIS — K21.9 GASTROESOPHAGEAL REFLUX DISEASE, UNSPECIFIED WHETHER ESOPHAGITIS PRESENT: ICD-10-CM

## 2022-10-20 DIAGNOSIS — Z00.00 ROUTINE MEDICAL EXAM: Primary | ICD-10-CM

## 2022-10-20 DIAGNOSIS — M51.26 LUMBAR DISC HERNIATION: Primary | ICD-10-CM

## 2022-10-20 DIAGNOSIS — N32.81 OAB (OVERACTIVE BLADDER): ICD-10-CM

## 2022-10-20 DIAGNOSIS — Z12.31 OTHER SCREENING MAMMOGRAM: ICD-10-CM

## 2022-10-20 PROCEDURE — 1159F PR MEDICATION LIST DOCUMENTED IN MEDICAL RECORD: ICD-10-PCS | Mod: CPTII,S$GLB,, | Performed by: STUDENT IN AN ORGANIZED HEALTH CARE EDUCATION/TRAINING PROGRAM

## 2022-10-20 PROCEDURE — 1159F MED LIST DOCD IN RCRD: CPT | Mod: CPTII,S$GLB,, | Performed by: STUDENT IN AN ORGANIZED HEALTH CARE EDUCATION/TRAINING PROGRAM

## 2022-10-20 PROCEDURE — 99024 PR POST-OP FOLLOW-UP VISIT: ICD-10-PCS | Mod: S$GLB,,, | Performed by: STUDENT IN AN ORGANIZED HEALTH CARE EDUCATION/TRAINING PROGRAM

## 2022-10-20 PROCEDURE — 3078F DIAST BP <80 MM HG: CPT | Mod: CPTII,S$GLB,, | Performed by: FAMILY MEDICINE

## 2022-10-20 PROCEDURE — 1160F RVW MEDS BY RX/DR IN RCRD: CPT | Mod: CPTII,S$GLB,, | Performed by: FAMILY MEDICINE

## 2022-10-20 PROCEDURE — 3044F PR MOST RECENT HEMOGLOBIN A1C LEVEL <7.0%: ICD-10-PCS | Mod: CPTII,S$GLB,, | Performed by: STUDENT IN AN ORGANIZED HEALTH CARE EDUCATION/TRAINING PROGRAM

## 2022-10-20 PROCEDURE — 99396 PR PREVENTIVE VISIT,EST,40-64: ICD-10-PCS | Mod: 25,S$GLB,, | Performed by: FAMILY MEDICINE

## 2022-10-20 PROCEDURE — 1159F PR MEDICATION LIST DOCUMENTED IN MEDICAL RECORD: ICD-10-PCS | Mod: CPTII,S$GLB,, | Performed by: FAMILY MEDICINE

## 2022-10-20 PROCEDURE — 1160F PR REVIEW ALL MEDS BY PRESCRIBER/CLIN PHARMACIST DOCUMENTED: ICD-10-PCS | Mod: CPTII,S$GLB,, | Performed by: FAMILY MEDICINE

## 2022-10-20 PROCEDURE — 99999 PR PBB SHADOW E&M-EST. PATIENT-LVL III: ICD-10-PCS | Mod: PBBFAC,,, | Performed by: FAMILY MEDICINE

## 2022-10-20 PROCEDURE — 3075F PR MOST RECENT SYSTOLIC BLOOD PRESS GE 130-139MM HG: ICD-10-PCS | Mod: CPTII,S$GLB,, | Performed by: FAMILY MEDICINE

## 2022-10-20 PROCEDURE — 3075F SYST BP GE 130 - 139MM HG: CPT | Mod: CPTII,S$GLB,, | Performed by: FAMILY MEDICINE

## 2022-10-20 PROCEDURE — 90686 FLU VACCINE (QUAD) GREATER THAN OR EQUAL TO 3YO PRESERVATIVE FREE IM: ICD-10-PCS | Mod: S$GLB,,, | Performed by: FAMILY MEDICINE

## 2022-10-20 PROCEDURE — 77063 MAMMO DIGITAL SCREENING BILAT WITH TOMO: ICD-10-PCS | Mod: 26,,, | Performed by: RADIOLOGY

## 2022-10-20 PROCEDURE — 3080F DIAST BP >= 90 MM HG: CPT | Mod: CPTII,S$GLB,, | Performed by: STUDENT IN AN ORGANIZED HEALTH CARE EDUCATION/TRAINING PROGRAM

## 2022-10-20 PROCEDURE — 77067 SCR MAMMO BI INCL CAD: CPT | Mod: TC,PO

## 2022-10-20 PROCEDURE — 3077F PR MOST RECENT SYSTOLIC BLOOD PRESSURE >= 140 MM HG: ICD-10-PCS | Mod: CPTII,S$GLB,, | Performed by: STUDENT IN AN ORGANIZED HEALTH CARE EDUCATION/TRAINING PROGRAM

## 2022-10-20 PROCEDURE — 77067 MAMMO DIGITAL SCREENING BILAT WITH TOMO: ICD-10-PCS | Mod: 26,,, | Performed by: RADIOLOGY

## 2022-10-20 PROCEDURE — 3077F SYST BP >= 140 MM HG: CPT | Mod: CPTII,S$GLB,, | Performed by: STUDENT IN AN ORGANIZED HEALTH CARE EDUCATION/TRAINING PROGRAM

## 2022-10-20 PROCEDURE — 99396 PREV VISIT EST AGE 40-64: CPT | Mod: 25,S$GLB,, | Performed by: FAMILY MEDICINE

## 2022-10-20 PROCEDURE — 3044F HG A1C LEVEL LT 7.0%: CPT | Mod: CPTII,S$GLB,, | Performed by: STUDENT IN AN ORGANIZED HEALTH CARE EDUCATION/TRAINING PROGRAM

## 2022-10-20 PROCEDURE — 1159F MED LIST DOCD IN RCRD: CPT | Mod: CPTII,S$GLB,, | Performed by: FAMILY MEDICINE

## 2022-10-20 PROCEDURE — 77063 BREAST TOMOSYNTHESIS BI: CPT | Mod: TC,PO

## 2022-10-20 PROCEDURE — 77063 BREAST TOMOSYNTHESIS BI: CPT | Mod: 26,,, | Performed by: RADIOLOGY

## 2022-10-20 PROCEDURE — 3080F PR MOST RECENT DIASTOLIC BLOOD PRESSURE >= 90 MM HG: ICD-10-PCS | Mod: CPTII,S$GLB,, | Performed by: STUDENT IN AN ORGANIZED HEALTH CARE EDUCATION/TRAINING PROGRAM

## 2022-10-20 PROCEDURE — 99024 POSTOP FOLLOW-UP VISIT: CPT | Mod: S$GLB,,, | Performed by: STUDENT IN AN ORGANIZED HEALTH CARE EDUCATION/TRAINING PROGRAM

## 2022-10-20 PROCEDURE — 77067 SCR MAMMO BI INCL CAD: CPT | Mod: 26,,, | Performed by: RADIOLOGY

## 2022-10-20 PROCEDURE — 90686 IIV4 VACC NO PRSV 0.5 ML IM: CPT | Mod: S$GLB,,, | Performed by: FAMILY MEDICINE

## 2022-10-20 PROCEDURE — 3078F PR MOST RECENT DIASTOLIC BLOOD PRESSURE < 80 MM HG: ICD-10-PCS | Mod: CPTII,S$GLB,, | Performed by: FAMILY MEDICINE

## 2022-10-20 PROCEDURE — 90471 FLU VACCINE (QUAD) GREATER THAN OR EQUAL TO 3YO PRESERVATIVE FREE IM: ICD-10-PCS | Mod: S$GLB,,, | Performed by: FAMILY MEDICINE

## 2022-10-20 PROCEDURE — 3044F HG A1C LEVEL LT 7.0%: CPT | Mod: CPTII,S$GLB,, | Performed by: FAMILY MEDICINE

## 2022-10-20 PROCEDURE — 99999 PR PBB SHADOW E&M-EST. PATIENT-LVL III: CPT | Mod: PBBFAC,,, | Performed by: FAMILY MEDICINE

## 2022-10-20 PROCEDURE — 3044F PR MOST RECENT HEMOGLOBIN A1C LEVEL <7.0%: ICD-10-PCS | Mod: CPTII,S$GLB,, | Performed by: FAMILY MEDICINE

## 2022-10-20 PROCEDURE — 90471 IMMUNIZATION ADMIN: CPT | Mod: S$GLB,,, | Performed by: FAMILY MEDICINE

## 2022-10-20 RX ORDER — GABAPENTIN 300 MG/1
300 CAPSULE ORAL 2 TIMES DAILY
Qty: 60 CAPSULE | Refills: 11 | Status: SHIPPED | OUTPATIENT
Start: 2022-10-20 | End: 2023-10-23

## 2022-10-20 NOTE — PROGRESS NOTES
Subjective:       Patient ID: Funmi Houston is a 57 y.o. female.    Chief Complaint: Annual Exam    HPI    Here for a check up    gerd stable. On pepcid and prilosec     On myrbetriq for oab.      On estradiol for post hormone replacement.     Recall, HEMILAMINECTOMY, SPINE, MINIMALLY INVASIVE- L5-S1 (Right), DISCECTOMY, SPINE, MINIMALLY INVASIVE, USING MICROSCOPE- L5-S1 (Right) on 9/6/2022.  Has residual right sciatic pain. Intermittent. Pain scale: 6/10. On neurontin and zanaflex.     Takes valtrex prn for fever blisters.     Reports having steroid injection in right shoulder approx 3 months ago. Reports relief but after lumbar surgery, now having right shoulder pain with movement. Worse at night.     Review of Systems        Review of Systems   Constitutional: Negative for fever and chills.   HENT: Negative for hearing loss and neck stiffness.    Eyes: Negative for redness and itching.   Respiratory: Negative for cough and choking.    Cardiovascular: Negative for chest pain and leg swelling.  Abdomen: Negative for abdominal pain and blood in stool.   Genitourinary: Negative for dysuria and flank pain.   Musculoskeletal: Negative for  gait problem.   Neurological: Negative for light-headedness and headaches.   Hematological: Negative for adenopathy.   Psychiatric/Behavioral: Negative for behavioral problems.     Objective:      Physical Exam  Constitutional:       General: She is not in acute distress.     Appearance: She is well-developed.   HENT:      Head: Normocephalic and atraumatic.   Eyes:      Conjunctiva/sclera: Conjunctivae normal.      Pupils: Pupils are equal, round, and reactive to light.   Neck:      Thyroid: No thyromegaly.   Cardiovascular:      Rate and Rhythm: Normal rate and regular rhythm.      Heart sounds: Normal heart sounds. No murmur heard.    No friction rub.   Pulmonary:      Effort: Pulmonary effort is normal.      Breath sounds: Normal breath sounds. No wheezing or rales.    Abdominal:      General: Bowel sounds are normal. There is no distension.      Palpations: Abdomen is soft. There is no mass.      Tenderness: There is no abdominal tenderness.   Musculoskeletal:         General: No tenderness.      Cervical back: Normal range of motion and neck supple.      Comments: Pain of right shoulder with rom. Limited rom.    Lymphadenopathy:      Cervical: No cervical adenopathy.   Skin:     General: Skin is warm.      Findings: No erythema or rash.   Neurological:      Mental Status: She is alert and oriented to person, place, and time.      Cranial Nerves: No cranial nerve deficit.      Deep Tendon Reflexes: Reflexes are normal and symmetric.   Psychiatric:         Thought Content: Thought content normal.       Assessment:       1. Routine medical exam    2. Gastroesophageal reflux disease, unspecified whether esophagitis present    3. OAB (overactive bladder)    4. Post-menopause on HRT (hormone replacement therapy)    5. Right shoulder pain, unspecified chronicity        Plan:       Routine medical exam    Gastroesophageal reflux disease, unspecified whether esophagitis present    OAB (overactive bladder)    Post-menopause on HRT (hormone replacement therapy)    Right shoulder pain, unspecified chronicity    Other orders  -     gabapentin (NEURONTIN) 300 MG capsule; Take 1 capsule (300 mg total) by mouth 2 (two) times daily.  Dispense: 60 capsule; Refill: 11                Plan:  Rf neurontin  Stretching exercises for right shoulder before bedtime  Cont current meds        Medication List with Changes/Refills   Current Medications    ESTRADIOL (ESTRACE) 0.5 MG TABLET    Take 0.5 mg by mouth once daily.      FAMOTIDINE (PEPCID) 40 MG TABLET    TAKE 1 TABLET (40 MG TOTAL) BY MOUTH NIGHTLY AS NEEDED FOR HEARTBURN.    MULTIVITAMIN (THERAGRAN) PER TABLET    Take 1 tablet by mouth once daily.    MYRBETRIQ 25 MG TB24 ER TABLET    Take 25 mg by mouth once daily.    OMEPRAZOLE (PRILOSEC) 40 MG  CAPSULE    TAKE 1 CAPSULE BY MOUTH EVERY DAY    OXYCODONE-ACETAMINOPHEN (PERCOCET) 5-325 MG PER TABLET    Take 1 tablet by mouth every 6 (six) hours as needed for Pain.    TIZANIDINE (ZANAFLEX) 4 MG TABLET    Take 1 tablet (4 mg total) by mouth every 8 (eight) hours as needed (muscle spasms).    VALACYCLOVIR (VALTREX) 1000 MG TABLET    TAKE 1 TABLET (1,000 MG TOTAL) BY MOUTH EVERY 12 (TWELVE) HOURS.   Changed and/or Refilled Medications    Modified Medication Previous Medication    GABAPENTIN (NEURONTIN) 300 MG CAPSULE gabapentin (NEURONTIN) 300 MG capsule       Take 1 capsule (300 mg total) by mouth 2 (two) times daily.    Take 1 capsule (300 mg total) by mouth 2 (two) times daily.

## 2022-10-20 NOTE — PROGRESS NOTES
"  REFERRING PHYSICIAN:    No ref. provider found  N/A    Postoperative visit     CLINICAL PROGRESS:   Funmi Houston is now  6 week s/p right L5-S1 MIS petey  Doing better  Had a lot of inflammation for the first 3 weeks  But has turned a corner  RLE radiculopathy much better    Ambulating , has not returned to work      preop  Who presents today with a history of chronic back pain.  Managed on and off.  Last 4 months she has had back pain with right S1 radiculopathy, severe pain limits ADLs motion she compensates her gait.  She drives 2 hours to work.  She is having difficult time sitting and driving secondary this.  It creates an antalgic gait.  She attempted physical therapy this exacerbated problem made her worse she had to stop.  She has been to contemplating management options and presented today.  Upon the review she is correlating image with a large lumbar disc herniation on the right at L5-S1 which is severe displacement of her ventral thecal sac and nerve root correlating with her symptoms.  We discussed the risks benefits pros and cons with 4 months, decreased patellar recent is severe ADL limitation and transient sensory deficit correlating she has indication for a lumbar microdiskectomy.  We reviewed the goals this would include a right-sided minimally invasive L5-S1 lumbar microdiskectomy.  With regard to her chronic back pain symptoms and question of a small spondylolisthesis at L3-4 will continue manages non operatively, with baseline x-rays and conservative therapy     MEDICATIONS:                   List reviewed, see chart for dosing details.    ALLERGIES:       Review of patient's allergies indicates:   Allergen Reactions    Penicillins Hives and Rash       PHYSICAL EXAMINATION:          VITAL SIGNS:   BP (!) 154/96 (BP Location: Left arm, Patient Position: Sitting)   Pulse 66   Resp 18   Ht 5' 8" (1.727 m)   Wt 124.6 kg (274 lb 11.1 oz)   BMI 41.77 kg/m²     GENERAL:  Patient is well " developed, well nourished, calm, collected, and in no apparent distress.  Incision is healed    NEUROLOGICAL:  Strength in the left upper extremity is interossei 5/5,  5/5, Bicep 5/5, Tricep 5/5, Deltoid 5/5.  Strength in the right upper extremity is interossei 5/5,  5/5, Bicep 5/5, Tricep 5/5, Deltoid 5/5.         Gait is intact          No flowsheet data found.      ASSESSMENT/PLAN:  Doing better  intact  Had a slow recovery and has avoided blt  Slowly out and community ambulating more  Has concerns with 2 hr drive for work   Will start PT for endurance and healthy spine and joint recs  BMI > 40 Ideal BMI will nutrition/intake will decrease spine and joint stress in the long run                Advised to call the office Iif any questions or concerns arise.    This note was partially dictated using voice recognition software, so please excuse any errors that were not corrected.

## 2022-10-25 ENCOUNTER — OFFICE VISIT (OUTPATIENT)
Dept: DERMATOLOGY | Facility: CLINIC | Age: 57
End: 2022-10-25
Payer: COMMERCIAL

## 2022-10-25 VITALS — RESPIRATION RATE: 18 BRPM | BODY MASS INDEX: 41.63 KG/M2 | HEIGHT: 68 IN | WEIGHT: 274.69 LBS

## 2022-10-25 DIAGNOSIS — L82.1 SEBORRHEIC KERATOSIS: ICD-10-CM

## 2022-10-25 DIAGNOSIS — D23.9 DERMATOFIBROMA: Primary | ICD-10-CM

## 2022-10-25 PROCEDURE — 3044F PR MOST RECENT HEMOGLOBIN A1C LEVEL <7.0%: ICD-10-PCS | Mod: CPTII,S$GLB,, | Performed by: DERMATOLOGY

## 2022-10-25 PROCEDURE — 1159F PR MEDICATION LIST DOCUMENTED IN MEDICAL RECORD: ICD-10-PCS | Mod: CPTII,S$GLB,, | Performed by: DERMATOLOGY

## 2022-10-25 PROCEDURE — 99999 PR PBB SHADOW E&M-EST. PATIENT-LVL III: CPT | Mod: PBBFAC,,, | Performed by: DERMATOLOGY

## 2022-10-25 PROCEDURE — 99213 OFFICE O/P EST LOW 20 MIN: CPT | Mod: S$GLB,,, | Performed by: DERMATOLOGY

## 2022-10-25 PROCEDURE — 1159F MED LIST DOCD IN RCRD: CPT | Mod: CPTII,S$GLB,, | Performed by: DERMATOLOGY

## 2022-10-25 PROCEDURE — 3044F HG A1C LEVEL LT 7.0%: CPT | Mod: CPTII,S$GLB,, | Performed by: DERMATOLOGY

## 2022-10-25 PROCEDURE — 99213 PR OFFICE/OUTPT VISIT, EST, LEVL III, 20-29 MIN: ICD-10-PCS | Mod: S$GLB,,, | Performed by: DERMATOLOGY

## 2022-10-25 PROCEDURE — 99999 PR PBB SHADOW E&M-EST. PATIENT-LVL III: ICD-10-PCS | Mod: PBBFAC,,, | Performed by: DERMATOLOGY

## 2022-10-25 NOTE — PROGRESS NOTES
Subjective:       Patient ID:  Funmi Houston is a 57 y.o. female who presents for   Chief Complaint   Patient presents with    Lesion     Patient present for lesion. LOV 7/31/2019 by Eunice Rodriguez MD.    C/o lesion to L lower leg x 1 year. Getting bigger. Not treating. Daughter was concerned.   Has numerous DF, previously seen by derm for this.     no Phx of NMSC.  no Fhx of melanoma.    Past Medical History:  No date: Digestive disorder      Comment:  gerd  No date: Edema  No date: OAB (overactive bladder)  No date: Restless leg syndrome      Review of Systems   Constitutional:  Negative for fever, chills and fatigue.   Skin:  Positive for daily sunscreen use, activity-related sunscreen use and wears hat. Negative for itching, rash and dry skin.      Objective:    Physical Exam   Constitutional: She appears well-developed and well-nourished. No distress.   Neurological: She is alert and oriented to person, place, and time. She is not disoriented.   Psychiatric: She has a normal mood and affect.   Skin:               Diagram Legend     Erythematous scaling macule/papule c/w actinic keratosis       Vascular papule c/w angioma      Pigmented verrucoid papule/plaque c/w seborrheic keratosis      Yellow umbilicated papule c/w sebaceous hyperplasia      Irregularly shaped tan macule c/w lentigo     1-2 mm smooth white papules consistent with Milia      Movable subcutaneous cyst with punctum c/w epidermal inclusion cyst      Subcutaneous movable cyst c/w pilar cyst      Firm pink to brown papule c/w dermatofibroma      Pedunculated fleshy papule(s) c/w skin tag(s)      Evenly pigmented macule c/w junctional nevus     Mildly variegated pigmented, slightly irregular-bordered macule c/w mildly atypical nevus      Flesh colored to evenly pigmented papule c/w intradermal nevus       Pink pearly papule/plaque c/w basal cell carcinoma      Erythematous hyperkeratotic cursted plaque c/w SCC      Surgical scar with no sign of  skin cancer recurrence      Open and closed comedones      Inflammatory papules and pustules      Verrucoid papule consistent consistent with wart     Erythematous eczematous patches and plaques     Dystrophic onycholytic nail with subungual debris c/w onychomycosis     Umbilicated papule    Erythematous-base heme-crusted tan verrucoid plaque consistent with inflamed seborrheic keratosis     Erythematous Silvery Scaling Plaque c/w Psoriasis     See annotation      Assessment / Plan:        Dermatofibroma  Reassurance given to patient. No treatment is necessary.   Treatment of benign, asymptomatic lesions may be considered cosmetic.      Seborrheic keratosis  Left lateral leg  These are benign inherited growths without a malignant potential. Reassurance given to patient. No treatment is necessary.            No follow-ups on file.

## 2022-10-29 ENCOUNTER — DOCUMENTATION ONLY (OUTPATIENT)
Dept: REHABILITATION | Facility: HOSPITAL | Age: 57
End: 2022-10-29

## 2022-11-09 ENCOUNTER — CLINICAL SUPPORT (OUTPATIENT)
Dept: REHABILITATION | Facility: HOSPITAL | Age: 57
End: 2022-11-09
Attending: STUDENT IN AN ORGANIZED HEALTH CARE EDUCATION/TRAINING PROGRAM
Payer: COMMERCIAL

## 2022-11-09 DIAGNOSIS — M51.26 LUMBAR DISC HERNIATION: ICD-10-CM

## 2022-11-09 DIAGNOSIS — M25.60 DECREASED RANGE OF MOTION: ICD-10-CM

## 2022-11-09 DIAGNOSIS — M62.81 MUSCLE WEAKNESS: ICD-10-CM

## 2022-11-09 DIAGNOSIS — R26.89 DECREASED FUNCTIONAL MOBILITY: ICD-10-CM

## 2022-11-09 PROCEDURE — 97162 PT EVAL MOD COMPLEX 30 MIN: CPT | Mod: PN

## 2022-11-09 PROCEDURE — 97110 THERAPEUTIC EXERCISES: CPT | Mod: PN

## 2022-11-10 ENCOUNTER — HOSPITAL ENCOUNTER (OUTPATIENT)
Dept: RADIOLOGY | Facility: HOSPITAL | Age: 57
Discharge: HOME | End: 2022-11-10
Attending: FAMILY MEDICINE
Payer: COMMERCIAL

## 2022-11-10 DIAGNOSIS — R92.8 ABNORMAL MAMMOGRAM: ICD-10-CM

## 2022-11-10 PROCEDURE — 77065 MAMMO DIGITAL DIAGNOSTIC RIGHT WITH TOMO: ICD-10-PCS | Mod: 26,RT,, | Performed by: RADIOLOGY

## 2022-11-10 PROCEDURE — 77065 DX MAMMO INCL CAD UNI: CPT | Mod: 26,RT,, | Performed by: RADIOLOGY

## 2022-11-10 PROCEDURE — 77065 DX MAMMO INCL CAD UNI: CPT | Mod: TC,PO,RT

## 2022-11-10 PROCEDURE — 77061 BREAST TOMOSYNTHESIS UNI: CPT | Mod: 26,RT,, | Performed by: RADIOLOGY

## 2022-11-10 PROCEDURE — 77061 MAMMO DIGITAL DIAGNOSTIC RIGHT WITH TOMO: ICD-10-PCS | Mod: 26,RT,, | Performed by: RADIOLOGY

## 2022-11-10 NOTE — PLAN OF CARE
[facility]  Physical Therapy Initial Evaluation    Name: Funmi Houston  Clinic Number: [id number]    Therapy Diagnosis:   Encounter Diagnosis   Name Primary?    Lumbar disc herniation      Physician: Prashanth Herr MD    Physician Orders: PT Eval and Treat   Medical Diagnosis from Referral: Lumbar disc herniation  Evaluation Date: 11/9/2022  Authorization Period Expiration: 10/24/23  Plan of Care Expiration: 1/6/22  Visit # / Visits authorized: 1/ 1    Time In: 5:15  Time Out: 6:10  Total Billable Time: 55 minutes    Precautions: Standard    Subjective   Date of onset: HEMILAMINECTOMY, SPINE, MINIMALLY INVASIVE- L5-S1 (Right)  DISCECTOMY, SPINE, MINIMALLY INVASIVE, USING MICROSCOPE- L5-S1 (Right)  History of current condition - Funmi reports: she continues to have pain to low back and down posterior R thigh.  She states when she stopped wearing back brace she started having burning to R low back too.  Pt states she takes Tylenol and Aleve for pain at night.  She states she wakes up at least 2x/night due to pain.  She states she sleeps on her back or her side.  She puts a pillow between her knees on her side or under knees in supine.  She's had increased pain the last 2 days.      She states she wakes up with pain, she has difficulty donning shoes.  She drives 1.5 hrs to work.  She states she gets pain after 2 hrs of sitting at desk.  The pain improves with standing and walking.  She states by 2-3pm she has a hard time getting the pain to decrease.     Medical History:   Past Medical History:   Diagnosis Date    Digestive disorder     gerd    Edema     OAB (overactive bladder)     Restless leg syndrome        Surgical History:   Funmi Houston  has a past surgical history that includes Cholecystectomy; Total vaginal hysterectomy; Tonsillectomy; Esophagogastroduodenoscopy; Esophagogastroduodenoscopy (N/A, 11/30/2020); Colonoscopy (N/A, 11/30/2020); Laminectomy using minimally invasive  technique (Right, 9/6/2022); and Minimally invasive surgical removal of intervertebral disc of spine using microscope (Right, 9/6/2022).    Medications:   Funmi has a current medication list which includes the following prescription(s): estradiol, famotidine, gabapentin, multivitamin, myrbetriq, omeprazole, oxycodone-acetaminophen, tizanidine, and valacyclovir.    Allergies:   Review of patient's allergies indicates:   Allergen Reactions    Penicillins Hives and Rash        Imaging, none: since sx on 9/6/22    Prior Therapy: PT in 7/2022 for back  Social History: pt  lives with their spouse, 1 story house  Occupation: desk job with long commute  Prior Level of Function: I with ADLs and work tasks  Current Level of Function: pain in AM, pain with prolonged sitting, pain donning shoes    Sleep: pt wakes up at least 2x/night due to pain  Numbness/tingling: and burning to R buttock and down posterior thigh to knee    Pain:  Current 4/10, worst 8/10, best 0/10   Location: right low back and down posterior right thigh to knee  Description: Burning  Aggravating Factors: prolonged sitting, pain in AM, donning shoes, standing/walking 45 min or more, change of position (sit to stand, rolling)  Easing Factors: standing walking <30min, Tylenol, Aleve, heated seats in car    Pts goals: get rid of pain to back and leg, be able to walk and garden        Objective     Observation: pt is pleasant and cooperative    Posture:  standing: fwd head, slight kyphosis, R iliac crest elevated, R PSIS more superior     Lumbar Range of Motion:    % Pain   Flexion 25   R buttock and posterior thight        Extension 25   No change in pain        Left Side Bending 50 no        Right Side Bending 50 no        Left rotation   50 Burning to R buttock and posterior thigh        Right Rotation   75 no             Lower Extremity Strength  Right LE  Left LE    Knee extension: 5/5 burning to posteiror thigh Knee extension: 5/5   Knee flexion: 5/5  Knee flexion: 5/5   Hip flexion: 4+/5 Hip flexion: 5/5   Hip extension:  3/5 pain and pt not activating glute Hip extension: 4-/5   Hip abduction: 3+/5 Hip abduction: 3+/5   Hip adduction: 5/5 Hip adduction 5/5   Ankle dorsiflexion: 5/5 Ankle dorsiflexion: 5/5       Special Tests:  -Repeated Flexion: pain down R LE  -Repeated Ext: pain, but centralization of symptoms with walking around gym  -Piriformis Test: + on R  -Bridge Test: unable to perform bridge due to low back and R posterior thigh pain    Neuro Dynamic Testing:    Sciatic nerve:      SLR: + for posterior thigh pain      Palpation: TTP and tightness to R glute medius and B piriformis    Sensation: numb/tingling to R lateral and posterior thigh    Flexibility:    Popliteal Angle: R = -40 degrees ; L = -10 degrees     Functional Limitations Reports - G Codes  CMS Impairment/Limitation/Restriction for FOTO Lumbar Spine Survey  Status Limitation G-Code CMS Severity Modifier  Intake 50% 50% Current Status CK - At least 40 percent but less than 60 percent  Predicted 67% 33% Goal Status+ CJ - At least 20 percent but less than 40 percent      PT Evaluation Completed? Yes  Discussed Plan of Care with patient: Yes    TREATMENT   Treatment Time In: 5:45  Treatment Time Out: 6:00  Total Treatment time separate from Evaluation: 15 minutes    Funmi received therapeutic exercises to develop ROM, flexibility, and posture for 15 minutes including:  Modified push pull (L hip flex, R hip ext) 3x3 sec  Piriformis stretch 3x20 sec  R sciatic n flossing x10  Attempted glute sets with white wedge, but stopped due increased pain to lateral R thigh  Walking perimeter of gym: able to centralize symptoms from posterior R thigh to knee to R superior glute     May add: try prone glute sets, hip abd walk, sh ext with TA set, row with TA set, seated piriformis stretch, education on proper body mechanics for daily chores and mini skirt principles, manual and dry needling as  needed    Home Exercises and Patient Education Provided    Education provided:   - role of PT  - anatomy of SIJ  -centralization of symptoms  -desk ergonomics, take standing/walking breaks every 20-30 min  -walk 10min/day for exercise  Pt gave verbal understanding to all education provided     Written Home Exercises Provided: yes.  Exercises were reviewed and Funmi was able to demonstrate them prior to the end of the session.  Funmi demonstrated good  understanding of the education provided.     See EMR under Patient Instructions for exercises provided 11/9/2022.    Assessment   Funmi is a 57 y.o. female referred to outpatient Physical Therapy with a medical diagnosis of Lumbar disc herniation. Pt presents with pain to R low back and down posterior R thigh to knee (burning), R anterior innominate, tightness to glute med and piriformis, R sciatic neural tension.  Walking centralized symptoms, but lumbar extension and lying prone without pillow under hips exacerbated pain.       Pt prognosis is Good.   Pt will benefit from skilled outpatient Physical Therapy to address the deficits stated above and in the chart below, provide pt/family education, and to maximize pt's level of independence.     Plan of care discussed with patient: Yes  Pt's spiritual, cultural and educational needs considered and patient is agreeable to the plan of care and goals as stated below:     Anticipated Barriers for therapy: none    Medical Necessity is demonstrated by the following  History  Co-morbidities and personal factors that may impact the plan of care Co-morbidities:   high BMI    Personal Factors:   lifestyle     high   Examination  Body Structures and Functions, activity limitations and participation restrictions that may impact the plan of care Body Regions:   back  lower extremities  trunk    Body Systems:    ROM  strength  transfers  transitions    Participation Restrictions:        Activity limitations:   Learning and  applying knowledge  no deficits    General Tasks and Commands  no deficits    Communication  no deficits    Mobility  lifting and carrying objects  walking  driving (bike, car, motorcycle)    Self care  toileting  dressing    Domestic Life  shopping  cooking  doing house work (cleaning house, washing dishes, laundry)    Interactions/Relationships  no deficits    Life Areas  no deficits    Community and Social Life  community life  recreation and leisure         high   Clinical Presentation evolving clinical presentation with changing clinical characteristics moderate   Decision Making/ Complexity Score: moderate       GOALS: Short Term Goals:  4 weeks (progressing, not met)  1.Report decreased    low back and R LE    pain  <   / =  4  /10 at its worst  to increase tolerance for ADLs  2. Pt to increase R popliteal angle by > or = 10 degrees in order to improve flexibility and posture.   3. Increased R LE strength by 1/3 muscle grade in all deficient planes to increase tolerance for ADL and work activities.  4. Increased L LE strength by 1/3 muscle grade in all deficient planes to increase tolerance for ADL and work activities.  5. Pt will report centralization of symptoms to no further than R buttock for increased tolerance for ADLs.   6. Pt to tolerate HEP to improve ROM and independence with ADL's  7. Pt will report compliance with standing breaks every 20-30 minutes for increased tolerance for work tasks.  8. Pt independent with push/pull to achieve level pelvis for increased ease with ADLs.    Long Term Goals: 8 weeks (progressing, not met)  1.Report decreased    low back    pain  <   / =  2  /10  to increase tolerance for ADLs.  2.Pt to increase R popliteal angle by > or = 20 degrees in order to improve flexibility and posture.   3.Increased R LE strength by 1 muscle grade in all deficient planes to increase tolerance for ADL and work activities.  4. Increased L LE strength by 1 muscle grade in all deficient  planes to increase tolerance for ADL and work activities.  5.Pt will present with level pelvis and maintain throughout session for increased ease with ADLs.  6. Pt to be Independent with HEP to improve ROM and independence with ADL's   7. Pt to demonstrate negative Bridge Test in order to show improved core strength for lumbar stabilization.       Plan   Plan of care Certification: 11/9/2022 to 1/6/22.    Outpatient Physical Therapy 2 times weekly for 8 weeks to include the following interventions: Cervical/Lumbar Traction, Electrical Stimulation  , Gait Training, Manual Therapy, Moist Heat/ Ice, Neuromuscular Re-ed, Patient Education, Self Care, Therapeutic Activities, Therapeutic Exercise, Ultrasound, and dry needling.     Su Darden, PT

## 2022-11-10 NOTE — PATIENT INSTRUCTIONS
sourced from: Neural Mobilization: Examination & Intervention Strategies  Brandon Olivas, PT, DPT, OCS, Cert. MDT    Repeat Motion 10 times  Perform 1 set, 2 times per day

## 2022-11-14 ENCOUNTER — CLINICAL SUPPORT (OUTPATIENT)
Dept: REHABILITATION | Facility: HOSPITAL | Age: 57
End: 2022-11-14
Payer: COMMERCIAL

## 2022-11-14 DIAGNOSIS — M62.81 MUSCLE WEAKNESS: Primary | ICD-10-CM

## 2022-11-14 DIAGNOSIS — M25.60 DECREASED RANGE OF MOTION: ICD-10-CM

## 2022-11-14 DIAGNOSIS — R26.89 DECREASED FUNCTIONAL MOBILITY: ICD-10-CM

## 2022-11-14 PROCEDURE — 97530 THERAPEUTIC ACTIVITIES: CPT | Mod: PN,CQ

## 2022-11-14 PROCEDURE — 97110 THERAPEUTIC EXERCISES: CPT | Mod: PN,CQ

## 2022-12-02 ENCOUNTER — PATIENT MESSAGE (OUTPATIENT)
Dept: NEUROSURGERY | Facility: CLINIC | Age: 57
End: 2022-12-02

## 2023-01-01 NOTE — PROGRESS NOTES
[facility]   Physical Therapy Treatment Note     Name: Funmi Houston  Clinic Number: [id number]    Therapy Diagnosis:   Encounter Diagnoses   Name Primary?    Muscle weakness Yes    Decreased range of motion     Decreased functional mobility      Physician: Prashanth Herr MD    Visit Date: 11/14/2022    Physician Orders: PT Eval and Treat   Medical Diagnosis from Referral: Lumbar disc herniation  Evaluation Date: 11/9/2022  Authorization Period Expiration: 10/24/23  Plan of Care Expiration: 1/6/22  Visit # / Visits authorized: 1/ 1     Time In: 5:15  Time Out: 6:10  Total Billable Time: 55 minutes     Precautions: Standard    PTA Visit #: 1/5     Time In: 4:47  Time Out: 6:18  Total Billable Time: 30 minutes    SUBJECTIVE     Pt reports: walking into the clinic she got a little grabbing pain to the back of the right hip. Had a bad day this past Saturday but woke Sunday with better symptoms. Still issues with sweeping and mopping.  She was compliant with home exercise program.  Response to previous treatment: felt pretty good  Functional change: too soon to tell    Pain: 6/10  Location: right low back      OBJECTIVE     Objective Measures updated at progress report unless specified.     Treatment     Funmi received the treatments listed below:      therapeutic exercises to develop ROM, flexibility, and posture for 15 minutes including:  Modified push pull (L hip flex, R hip ext) 3x3 sec  Piriformis stretch 3x20 sec  R sciatic n flossing x10  Attempted glute sets with white wedge, but stopped due increased pain to lateral R thigh  Walking perimeter of gym: able to centralize symptoms from posterior R thigh to knee to R superior glute      May add: try prone glute sets, hip abd walk, sh ext with TA set, row with TA set, seated piriformis stretch, education on proper body mechanics for daily chores and mini skirt principles, manual and dry needling as needed    manual therapy techniques:  This note was copied from the mother's chart.     05/25/23 4537   Maternal Information   Date of Referral 05/25/23   Person Making Referral lactation consultant  (new mother/baby couplet)   Maternal Reason for Referral no prior breastfeeding experience   Maternal Infant Feeding   Maternal Emotional State relaxed;receptive   Signs of Milk Transfer other (see comments)  (Mom said baby breast fed well once yesterday, but hasn't since then.  A bottle has been offered also, but baby was uninterested.)   Latch Assistance other (see comments)  (Mom said she may want to just pump and bottle feed, for now.)   Milk Expression/Equipment   Breast Pump Type double electric, personal  (Mom has a home Zomee pump.  She said she has been pumping prior to delivery and was able to pump about 50 mL's.  She said she pumped once last night, but the milk sat out too long and she dumped it out.)   Breast Pumping   Breast Pumping Interventions early pumping promoted;frequent pumping encouraged     Baby is not interested in nursing or taking a bottle.  He is very gaggy and spitty.  Mom was advised to pump every 3 hours with her home pump.  She said she may pump and bottle feed only.  A SLP consult was entered by RN for assistance with bottle feeding.   Soft tissue Mobilization were applied to the: R gluteal fold and R piriformis for 5 minutes, including:  Gentle STM to both areas to reduce muscular tightness and referred pain down the leg      therapeutic activities to improve functional performance for 10 minutes, including:  Education on mopping and sweeping, using whole body to move mop and avoiding use of back into bending or twisting motions      Patient Education and Home Exercises     Home Exercises Provided and Patient Education Provided     Education provided:   - use of push/pull more frequently throughout the day as able for symptom management  - monitoring daily activities to maintain good body mechanics and posture  - stretching to point of tightness, not pain    Written Home Exercises Provided: yes. Exercises were reviewed and Funmi was able to demonstrate them prior to the end of the session.  Funmi demonstrated good  understanding of the education provided. See EMR under Patient Instructions in Notes section for exercises provided during therapy sessions    ASSESSMENT     Patient presents to clinic with mild symptoms, but was able to note positive changes with stretching and use of push/pull. Required cues initially for proper technique of push/pull but was able to complete independently by end of session. Slight onset of burning sensation down R lateral hip/thigh following education on mopping and sweeping but able to relieve with use of push/pull. Patient had some difficulty isolating good gluteal contraction in prone, but will improve with practice. She will benefit from further progression for hip and core strengthening as able.    Funmi Is progressing well towards her goals.   Pt prognosis is Good.     Pt will continue to benefit from skilled outpatient physical therapy to address the deficits listed in the problem list box on initial evaluation, provide pt/family education and to maximize pt's level of independence in the home and  community environment.     Pt's spiritual, cultural and educational needs considered and pt agreeable to plan of care and goals.     Anticipated barriers to physical therapy: none    Goals:   Short Term Goals:  4 weeks (progressing, not met)  1.Report decreased    low back and R LE    pain  <   / =  4  /10 at its worst  to increase tolerance for ADLs  2. Pt to increase R popliteal angle by > or = 10 degrees in order to improve flexibility and posture.   3. Increased R LE strength by 1/3 muscle grade in all deficient planes to increase tolerance for ADL and work activities.  4. Increased L LE strength by 1/3 muscle grade in all deficient planes to increase tolerance for ADL and work activities.  5. Pt will report centralization of symptoms to no further than R buttock for increased tolerance for ADLs.   6. Pt to tolerate HEP to improve ROM and independence with ADL's  7. Pt will report compliance with standing breaks every 20-30 minutes for increased tolerance for work tasks.  8. Pt independent with push/pull to achieve level pelvis for increased ease with ADLs.     Long Term Goals: 8 weeks (progressing, not met)  1.Report decreased    low back    pain  <   / =  2  /10  to increase tolerance for ADLs.  2.Pt to increase R popliteal angle by > or = 20 degrees in order to improve flexibility and posture.   3.Increased R LE strength by 1 muscle grade in all deficient planes to increase tolerance for ADL and work activities.  4. Increased L LE strength by 1 muscle grade in all deficient planes to increase tolerance for ADL and work activities.  5.Pt will present with level pelvis and maintain throughout session for increased ease with ADLs.  6. Pt to be Independent with HEP to improve ROM and independence with ADL's   7. Pt to demonstrate negative Bridge Test in order to show improved core strength for lumbar stabilization.     PLAN     Continue with current POC toward physical therapy goals established at initial  evaluation.     Aleja Contreras, JUAN

## 2023-08-09 ENCOUNTER — PATIENT MESSAGE (OUTPATIENT)
Dept: ADMINISTRATIVE | Facility: HOSPITAL | Age: 58
End: 2023-08-09
Payer: COMMERCIAL

## 2023-09-25 ENCOUNTER — TELEPHONE (OUTPATIENT)
Dept: URGENT CARE | Facility: CLINIC | Age: 58
End: 2023-09-25

## 2023-09-25 ENCOUNTER — OFFICE VISIT (OUTPATIENT)
Dept: URGENT CARE | Facility: CLINIC | Age: 58
End: 2023-09-25
Payer: COMMERCIAL

## 2023-09-25 ENCOUNTER — TELEPHONE (OUTPATIENT)
Dept: FAMILY MEDICINE | Facility: CLINIC | Age: 58
End: 2023-09-25
Payer: COMMERCIAL

## 2023-09-25 VITALS
SYSTOLIC BLOOD PRESSURE: 141 MMHG | HEART RATE: 96 BPM | WEIGHT: 275 LBS | OXYGEN SATURATION: 99 % | HEIGHT: 68 IN | BODY MASS INDEX: 41.68 KG/M2 | RESPIRATION RATE: 18 BRPM | DIASTOLIC BLOOD PRESSURE: 78 MMHG | TEMPERATURE: 98 F

## 2023-09-25 DIAGNOSIS — R05.9 COUGH, UNSPECIFIED TYPE: ICD-10-CM

## 2023-09-25 DIAGNOSIS — J40 BRONCHITIS: Primary | ICD-10-CM

## 2023-09-25 DIAGNOSIS — U07.1 COVID: Primary | ICD-10-CM

## 2023-09-25 LAB
CTP QC/QA: YES
SARS-COV-2 AG RESP QL IA.RAPID: POSITIVE

## 2023-09-25 PROCEDURE — 99214 OFFICE O/P EST MOD 30 MIN: CPT | Mod: S$GLB,,, | Performed by: PHYSICIAN ASSISTANT

## 2023-09-25 PROCEDURE — 99214 PR OFFICE/OUTPT VISIT, EST, LEVL IV, 30-39 MIN: ICD-10-PCS | Mod: S$GLB,,, | Performed by: PHYSICIAN ASSISTANT

## 2023-09-25 PROCEDURE — 87811 SARS-COV-2 COVID19 W/OPTIC: CPT | Mod: QW,S$GLB,, | Performed by: PHYSICIAN ASSISTANT

## 2023-09-25 PROCEDURE — 87811 SARS CORONAVIRUS 2 ANTIGEN POCT, MANUAL READ: ICD-10-PCS | Mod: QW,S$GLB,, | Performed by: PHYSICIAN ASSISTANT

## 2023-09-25 RX ORDER — PROMETHAZINE HYDROCHLORIDE AND DEXTROMETHORPHAN HYDROBROMIDE 6.25; 15 MG/5ML; MG/5ML
5 SYRUP ORAL EVERY 4 HOURS PRN
Qty: 240 ML | Refills: 0 | Status: SHIPPED | OUTPATIENT
Start: 2023-09-25 | End: 2023-10-05

## 2023-09-25 RX ORDER — AZITHROMYCIN 250 MG/1
TABLET, FILM COATED ORAL
Qty: 6 TABLET | Refills: 0 | Status: SHIPPED | OUTPATIENT
Start: 2023-09-25 | End: 2023-09-30

## 2023-09-25 RX ORDER — IPRATROPIUM BROMIDE 21 UG/1
2 SPRAY, METERED NASAL 3 TIMES DAILY
Qty: 30 ML | Refills: 0 | Status: SHIPPED | OUTPATIENT
Start: 2023-09-25 | End: 2023-10-25

## 2023-09-25 NOTE — PATIENT INSTRUCTIONS
Stay home/quarantine until symptoms are resolved.  ER if worsening symptoms- call before entry.  IF NOT IMPROVING, FOLLOW UP WITH VIRTUAL ONLINE VISIT WITH A PROVIDER 24/7- FOR MORE INFORMATION OR TO DOWNLOAD THE RUBY, VISIT [facility]/ANYWHERE    FOR 24/7 NURSE ADVICE, CALL [phone]  FOR COVID 19 RELATED QUESTIONS, CALL THE [facility] covid hotline: [phone]    Virtual visit [facility] provider - [facility] ANYWHERE CARE:  [email or web address]    [place] FOR UP TO DATE INFORMATION:  Text or dial 211, test keyword LACOVID -211 OR DIAL 211    HELPFUL EXTERNAL RESOURCES:  OFFICE OF PUBLIC HEALTH: [place]   Http://ldh.la.gov/  1-179.828.3109    CENTER FOR DISEASE CONTROL  Https://www.cdc.gov/    WORLD HEALTH ORGANIZATION (WHO)  Https://www.who.int/     Use an antihistamine such as Claritin, Zyrtec or Allegra to dry you out.     Use pseudoephedrine (behind the counter) to decongest. Pseudoephedrine  30 mg up to 240 mg /day. It can raise your blood pressure and give you palpitations.    Use mucinex (guaifenisin) to break up mucous up to 2400mg/day to loosen any mucous. The mucinex DM pill has a cough suppressant that can be used at night to stop the tickle at the back of your throat.    Use Nasal Saline to mechanically move any post nasal drip from your eustachian tube or from the back of your throat.    Use Afrin in each nare for no longer than 3 days, as it is addictive. It can also dry out your mucous membranes and cause elevated blood pressure.    Use Flonase 1-2 sprays/nostril per day. It is a local acting steroid nasal spray, if you develop a bloody nose, stop using the medication immediately.    Use warm salt water gargles to ease your throat pain. Warm salt water gargles as needed for sore throat-  1/2 tsp salt to 1 cup warm water, gargle as desired.    Sometimes Nyquil at night is beneficial to help you get some rest, however it is sedating and it does have an antihistamine, and tylenol.

## 2023-09-25 NOTE — TELEPHONE ENCOUNTER
----- Message from Chrissie Darden MA sent at 9/25/2023 11:18 AM CDT -----  Regarding: Requesting Prescription  Patient is requesting antibiotic be called in to her pharmacy.     Will send z-pack to pharmacy per pt request

## 2023-09-25 NOTE — PROGRESS NOTES
"Subjective:      Patient ID: Funmi Houston is a 58 y.o. female.    Vitals:  height is 5' 8" (1.727 m) and weight is 124.7 kg (275 lb). Her temperature is 98.1 °F (36.7 °C). Her blood pressure is 141/78 (abnormal) and her pulse is 96. Her respiration is 18 and oxygen saturation is 99%.     Chief Complaint: Sore Throat    Pt presents today with c/o sore throat, cough, and left ear pain x's 3 days. Pt has taken otc meds for symptoms with little relief. Pt has no known exposures. Pain level 7/10.    Sore Throat   This is a new problem. The current episode started in the past 7 days. The problem has been unchanged. Neither side of throat is experiencing more pain than the other. There has been no fever. The pain is at a severity of 7/10. The pain is moderate. Associated symptoms include coughing, ear pain, headaches and a hoarse voice. Pertinent negatives include no abdominal pain, congestion, diarrhea, drooling, ear discharge, plugged ear sensation, neck pain, shortness of breath, stridor, swollen glands, trouble swallowing or vomiting. She has had no exposure to strep or mono. She has tried acetaminophen and NSAIDs for the symptoms. The treatment provided no relief.       HENT:  Positive for ear pain and sore throat. Negative for ear discharge, drooling, congestion and trouble swallowing.    Neck: Negative for neck pain.   Respiratory:  Positive for cough. Negative for shortness of breath and stridor.    Gastrointestinal:  Negative for abdominal pain, vomiting and diarrhea.   Neurological:  Positive for headaches.      Objective:     Physical Exam   Constitutional: She is oriented to person, place, and time. She appears well-developed. She is cooperative.  Non-toxic appearance. She does not appear ill. No distress.   HENT:   Head: Normocephalic and atraumatic.   Ears:   Right Ear: Hearing, tympanic membrane, external ear and ear canal normal.   Left Ear: Hearing, tympanic membrane, external ear and ear canal normal. "   Nose: Mucosal edema and rhinorrhea present. No nasal deformity. No epistaxis. Right sinus exhibits no maxillary sinus tenderness and no frontal sinus tenderness. Left sinus exhibits no maxillary sinus tenderness and no frontal sinus tenderness.   Mouth/Throat: Uvula is midline and mucous membranes are normal. No trismus in the jaw. Normal dentition. No uvula swelling. Posterior oropharyngeal erythema and cobblestoning present. No oropharyngeal exudate, posterior oropharyngeal edema or tonsillar abscesses. No tonsillar exudate.   Eyes: Conjunctivae and lids are normal. No scleral icterus.   Neck: Trachea normal and phonation normal. Neck supple. No edema present. No erythema present. No neck rigidity present.   Cardiovascular: Normal rate, regular rhythm, normal heart sounds and normal pulses.   Pulmonary/Chest: Effort normal and breath sounds normal. No accessory muscle usage or stridor. No respiratory distress. She has no decreased breath sounds. She has no wheezes. She has no rhonchi. She has no rales.   Abdominal: Normal appearance.   Musculoskeletal: Normal range of motion.         General: No deformity or edema. Normal range of motion.   Lymphadenopathy:     She has no cervical adenopathy.   Neurological: She is alert and oriented to person, place, and time. She exhibits normal muscle tone. Coordination normal.   Skin: Skin is warm, dry, intact, not diaphoretic, not pale and no rash. Capillary refill takes less than 2 seconds.   Psychiatric: Her speech is normal and behavior is normal. Judgment and thought content normal.   Nursing note and vitals reviewed.    Results for orders placed or performed in visit on 09/25/23   SARS Coronavirus 2 Antigen, POCT Manual Read   Result Value Ref Range    SARS Coronavirus 2 Antigen Positive (A) Negative     Acceptable Yes        Assessment:     1. COVID    2. Cough, unspecified type        Plan:       COVID    Cough, unspecified type  -     SARS Coronavirus  2 Antigen, POCT Manual Read    Other orders  -     promethazine-dextromethorphan (PROMETHAZINE-DM) 6.25-15 mg/5 mL Syrp; Take 5 mLs by mouth every 4 (four) hours as needed (cough).  Dispense: 240 mL; Refill: 0  -     ipratropium (ATROVENT) 21 mcg (0.03 %) nasal spray; 2 sprays by Each Nostril route 3 (three) times daily.  Dispense: 30 mL; Refill: 0             Patient Instructions   Stay home/quarantine until symptoms are resolved.  ER if worsening symptoms- call before entry.  IF NOT IMPROVING, FOLLOW UP WITH VIRTUAL ONLINE VISIT WITH A PROVIDER 24/7- FOR MORE INFORMATION OR TO DOWNLOAD THE RUBY, VISIT [email or web address]    FOR 24/7 NURSE ADVICE, CALL [phone]  FOR COVID 19 RELATED QUESTIONS, CALL THE [facility] covid hotline: [phone]    Virtual visit with provider - [facility] ANYWHERE CARE:  [email or web address]    [place] FOR UP TO DATE INFORMATION:  Text or dial 211, test keyword LACOVID -420 OR DIAL 211    HELPFUL EXTERNAL RESOURCES:  OFFICE OF PUBLIC HEALTH: [place]   Http://ldh.[place].gov/  1-909.197.5321    CENTER FOR DISEASE CONTROL  Https://www.cdc.gov/    WORLD HEALTH ORGANIZATION (WHO)  Https://www.who.int/     Use an antihistamine such as Claritin, Zyrtec or Allegra to dry you out.     Use pseudoephedrine (behind the counter) to decongest. Pseudoephedrine  30 mg up to 240 mg /day. It can raise your blood pressure and give you palpitations.    Use mucinex (guaifenisin) to break up mucous up to 2400mg/day to loosen any mucous. The mucinex DM pill has a cough suppressant that can be used at night to stop the tickle at the back of your throat.    Use Nasal Saline to mechanically move any post nasal drip from your eustachian tube or from the back of your throat.    Use Afrin in each nare for no longer than 3 days, as it is addictive. It can also dry out your mucous membranes and cause elevated blood pressure.    Use Flonase 1-2 sprays/nostril per day. It is a local acting steroid nasal spray, if  you develop a bloody nose, stop using the medication immediately.    Use warm salt water gargles to ease your throat pain. Warm salt water gargles as needed for sore throat-  1/2 tsp salt to 1 cup warm water, gargle as desired.    Sometimes Nyquil at night is beneficial to help you get some rest, however it is sedating and it does have an antihistamine, and tylenol.

## 2023-10-23 ENCOUNTER — OFFICE VISIT (OUTPATIENT)
Dept: FAMILY MEDICINE | Facility: CLINIC | Age: 58
End: 2023-10-23
Payer: COMMERCIAL

## 2023-10-23 VITALS
SYSTOLIC BLOOD PRESSURE: 136 MMHG | DIASTOLIC BLOOD PRESSURE: 80 MMHG | BODY MASS INDEX: 41.43 KG/M2 | HEART RATE: 83 BPM | WEIGHT: 273.38 LBS | RESPIRATION RATE: 18 BRPM | HEIGHT: 68 IN | OXYGEN SATURATION: 97 %

## 2023-10-23 DIAGNOSIS — M54.16 LUMBAR RADICULOPATHY: ICD-10-CM

## 2023-10-23 DIAGNOSIS — B00.1 FEVER BLISTER: ICD-10-CM

## 2023-10-23 DIAGNOSIS — Z00.00 PREVENTATIVE HEALTH CARE: Primary | ICD-10-CM

## 2023-10-23 DIAGNOSIS — K21.9 GASTROESOPHAGEAL REFLUX DISEASE, UNSPECIFIED WHETHER ESOPHAGITIS PRESENT: ICD-10-CM

## 2023-10-23 PROCEDURE — 3008F PR BODY MASS INDEX (BMI) DOCUMENTED: ICD-10-PCS | Mod: CPTII,S$GLB,, | Performed by: NURSE PRACTITIONER

## 2023-10-23 PROCEDURE — 3075F SYST BP GE 130 - 139MM HG: CPT | Mod: CPTII,S$GLB,, | Performed by: NURSE PRACTITIONER

## 2023-10-23 PROCEDURE — 1159F PR MEDICATION LIST DOCUMENTED IN MEDICAL RECORD: ICD-10-PCS | Mod: CPTII,S$GLB,, | Performed by: NURSE PRACTITIONER

## 2023-10-23 PROCEDURE — 1159F MED LIST DOCD IN RCRD: CPT | Mod: CPTII,S$GLB,, | Performed by: NURSE PRACTITIONER

## 2023-10-23 PROCEDURE — 90471 FLU VACCINE (QUAD) GREATER THAN OR EQUAL TO 3YO PRESERVATIVE FREE IM: ICD-10-PCS | Mod: S$GLB,,, | Performed by: NURSE PRACTITIONER

## 2023-10-23 PROCEDURE — 99396 PREV VISIT EST AGE 40-64: CPT | Mod: 25,S$GLB,, | Performed by: NURSE PRACTITIONER

## 2023-10-23 PROCEDURE — 99999 PR PBB SHADOW E&M-EST. PATIENT-LVL III: ICD-10-PCS | Mod: PBBFAC,,, | Performed by: NURSE PRACTITIONER

## 2023-10-23 PROCEDURE — 3079F PR MOST RECENT DIASTOLIC BLOOD PRESSURE 80-89 MM HG: ICD-10-PCS | Mod: CPTII,S$GLB,, | Performed by: NURSE PRACTITIONER

## 2023-10-23 PROCEDURE — 3008F BODY MASS INDEX DOCD: CPT | Mod: CPTII,S$GLB,, | Performed by: NURSE PRACTITIONER

## 2023-10-23 PROCEDURE — 90686 FLU VACCINE (QUAD) GREATER THAN OR EQUAL TO 3YO PRESERVATIVE FREE IM: ICD-10-PCS | Mod: S$GLB,,, | Performed by: NURSE PRACTITIONER

## 2023-10-23 PROCEDURE — 99999 PR PBB SHADOW E&M-EST. PATIENT-LVL III: CPT | Mod: PBBFAC,,, | Performed by: NURSE PRACTITIONER

## 2023-10-23 PROCEDURE — 3079F DIAST BP 80-89 MM HG: CPT | Mod: CPTII,S$GLB,, | Performed by: NURSE PRACTITIONER

## 2023-10-23 PROCEDURE — 90471 IMMUNIZATION ADMIN: CPT | Mod: S$GLB,,, | Performed by: NURSE PRACTITIONER

## 2023-10-23 PROCEDURE — 99396 PR PREVENTIVE VISIT,EST,40-64: ICD-10-PCS | Mod: 25,S$GLB,, | Performed by: NURSE PRACTITIONER

## 2023-10-23 PROCEDURE — 3075F PR MOST RECENT SYSTOLIC BLOOD PRESS GE 130-139MM HG: ICD-10-PCS | Mod: CPTII,S$GLB,, | Performed by: NURSE PRACTITIONER

## 2023-10-23 PROCEDURE — 90686 IIV4 VACC NO PRSV 0.5 ML IM: CPT | Mod: S$GLB,,, | Performed by: NURSE PRACTITIONER

## 2023-10-23 RX ORDER — FAMOTIDINE 40 MG/1
TABLET, FILM COATED ORAL
Qty: 90 TABLET | Refills: 3 | Status: SHIPPED | OUTPATIENT
Start: 2023-10-23

## 2023-10-23 RX ORDER — IBUPROFEN 800 MG/1
800 TABLET ORAL 2 TIMES DAILY PRN
Qty: 60 TABLET | Refills: 3 | Status: SHIPPED | OUTPATIENT
Start: 2023-10-23 | End: 2024-03-01

## 2023-10-23 RX ORDER — VALACYCLOVIR HYDROCHLORIDE 500 MG/1
500 TABLET, FILM COATED ORAL DAILY
Qty: 90 TABLET | Refills: 3 | Status: SHIPPED | OUTPATIENT
Start: 2023-10-23

## 2023-10-23 RX ORDER — OMEPRAZOLE 40 MG/1
40 CAPSULE, DELAYED RELEASE ORAL DAILY
Qty: 90 CAPSULE | Refills: 3 | Status: SHIPPED | OUTPATIENT
Start: 2023-10-23

## 2023-10-23 NOTE — PROGRESS NOTES
Subjective:       Patient ID: Funmi Houston is a 58 y.o. female.    Chief Complaint: Medication Refill    HPI  New patient to me presents for annual exam, former patient of Dr Burks    Lumbar spinal stenosis: stable. Dr Gill--[facility]. Using ibuprofen 800mg prn--averages twice weekly    GERD: controlled on omeprazole and pepcid     OAB: currently on myrbetriq. Seeign urology for eval this week. +Urge incontinence     GYN Dr Cadena--on estradiol. FU next month     Fever blisters: frequent, takes valtrex PRN. Interested in preventive dose     Vitals:    10/23/23 0744   BP: 136/80   Pulse: 83   Resp: 18     Review of Systems   Constitutional:  Negative for fever.   Respiratory:  Negative for cough.    Cardiovascular:  Negative for chest pain.       Past Medical History:   Diagnosis Date    Digestive disorder     gerd    Edema     OAB (overactive bladder)     Restless leg syndrome      Objective:      Physical Exam  Vitals and nursing note reviewed.   Constitutional:       General: She is not in acute distress.     Appearance: She is not diaphoretic.   HENT:      Head: Normocephalic.   Eyes:      General:         Right eye: No discharge.         Left eye: No discharge.   Neck:      Trachea: No tracheal deviation.   Cardiovascular:      Rate and Rhythm: Normal rate and regular rhythm.      Heart sounds: Normal heart sounds.   Pulmonary:      Effort: Pulmonary effort is normal.      Breath sounds: Normal breath sounds.   Skin:     Coloration: Skin is not pale.   Neurological:      Mental Status: She is alert and oriented to person, place, and time.   Psychiatric:         Speech: Speech normal.         Behavior: Behavior normal.         Thought Content: Thought content normal.         Judgment: Judgment normal.         Assessment:       1. Preventative health care    2. Lumbar radiculopathy    3. Fever blister    4. Gastroesophageal reflux disease, unspecified whether esophagitis present        Plan:        Preventative health care  -     CBC Auto Differential; Future; Expected date: 10/23/2023  -     Comprehensive Metabolic Panel; Future; Expected date: 10/23/2023  -     Lipid Panel; Future; Expected date: 10/23/2023  -     TSH; Future; Expected date: 10/23/2023  -     Hemoglobin A1C; Future; Expected date: 10/23/2023    Lumbar radiculopathy  -     ibuprofen (ADVIL,MOTRIN) 800 MG tablet; Take 1 tablet (800 mg total) by mouth 2 (two) times daily as needed for Pain.  Dispense: 60 tablet; Refill: 3    Fever blister  -     valACYclovir (VALTREX) 500 MG tablet; Take 1 tablet (500 mg total) by mouth once daily.  Dispense: 90 tablet; Refill: 3    Gastroesophageal reflux disease, unspecified whether esophagitis present  -     omeprazole (PRILOSEC) 40 MG capsule; Take 1 capsule (40 mg total) by mouth once daily.  Dispense: 90 capsule; Refill: 3  -     famotidine (PEPCID) 40 MG tablet; TAKE 1 TABLET BY MOUTH NIGHTLY AS NEEDED FOR HEARTBURN.  Dispense: 90 tablet; Refill: 3            Follow up in about 6 months (around 4/23/2024).    Medication List with Changes/Refills   New Medications    IBUPROFEN (ADVIL,MOTRIN) 800 MG TABLET    Take 1 tablet (800 mg total) by mouth 2 (two) times daily as needed for Pain.   Current Medications    ESTRADIOL (ESTRACE) 0.5 MG TABLET    Take 0.5 mg by mouth once daily.      IPRATROPIUM (ATROVENT) 21 MCG (0.03 %) NASAL SPRAY    2 sprays by Each Nostril route 3 (three) times daily.    MULTIVITAMIN (THERAGRAN) PER TABLET    Take 1 tablet by mouth once daily.    MYRBETRIQ 25 MG TB24 ER TABLET    Take 25 mg by mouth once daily.   Changed and/or Refilled Medications    Modified Medication Previous Medication    FAMOTIDINE (PEPCID) 40 MG TABLET famotidine (PEPCID) 40 MG tablet       TAKE 1 TABLET BY MOUTH NIGHTLY AS NEEDED FOR HEARTBURN.    TAKE 1 TABLET BY MOUTH NIGHTLY AS NEEDED FOR HEARTBURN.    OMEPRAZOLE (PRILOSEC) 40 MG CAPSULE omeprazole (PRILOSEC) 40 MG capsule       Take 1 capsule (40 mg  total) by mouth once daily.    Take 1 capsule (40 mg total) by mouth once daily.    VALACYCLOVIR (VALTREX) 500 MG TABLET valACYclovir (VALTREX) 1000 MG tablet       Take 1 tablet (500 mg total) by mouth once daily.    TAKE 1 TABLET (1,000 MG TOTAL) BY MOUTH EVERY 12 (TWELVE) HOURS.   Discontinued Medications    GABAPENTIN (NEURONTIN) 300 MG CAPSULE    Take 1 capsule (300 mg total) by mouth 2 (two) times daily.    LIDOCAINE (LIDODERM) 5 %    Place 1 patch onto the skin once daily. Remove & Discard patch within 12 hours or as directed by MD    NAPROXEN (NAPROSYN) 500 MG TABLET    Take 1 tablet (500 mg total) by mouth 2 (two) times daily with meals.    OXYCODONE-ACETAMINOPHEN (PERCOCET) 5-325 MG PER TABLET    Take 1 tablet by mouth every 6 (six) hours as needed for Pain.    TIZANIDINE (ZANAFLEX) 4 MG TABLET    Take 1 tablet (4 mg total) by mouth every 8 (eight) hours as needed (muscle spasms).

## 2023-10-25 ENCOUNTER — OFFICE VISIT (OUTPATIENT)
Dept: UROLOGY | Facility: CLINIC | Age: 58
End: 2023-10-25
Payer: COMMERCIAL

## 2023-10-25 ENCOUNTER — HOSPITAL ENCOUNTER (OUTPATIENT)
Dept: RADIOLOGY | Facility: HOSPITAL | Age: 58
Discharge: HOME | End: 2023-10-25
Payer: COMMERCIAL

## 2023-10-25 VITALS — WEIGHT: 274.69 LBS | HEIGHT: 68 IN | BODY MASS INDEX: 41.63 KG/M2

## 2023-10-25 DIAGNOSIS — Z12.31 ENCOUNTER FOR SCREENING MAMMOGRAM FOR BREAST CANCER: ICD-10-CM

## 2023-10-25 DIAGNOSIS — R35.0 URINE FREQUENCY: Primary | ICD-10-CM

## 2023-10-25 DIAGNOSIS — N32.81 OAB (OVERACTIVE BLADDER): ICD-10-CM

## 2023-10-25 DIAGNOSIS — Z01.419 WOMEN'S ANNUAL ROUTINE GYNECOLOGICAL EXAMINATION: ICD-10-CM

## 2023-10-25 DIAGNOSIS — R39.15 URINARY URGENCY: ICD-10-CM

## 2023-10-25 LAB
BILIRUBIN, UA POC OHS: NEGATIVE
BLOOD, UA POC OHS: ABNORMAL
CLARITY, UA POC OHS: CLEAR
COLOR, UA POC OHS: YELLOW
GLUCOSE, UA POC OHS: NEGATIVE
KETONES, UA POC OHS: NEGATIVE
LEUKOCYTES, UA POC OHS: NEGATIVE
NITRITE, UA POC OHS: NEGATIVE
PH, UA POC OHS: 6
POC RESIDUAL URINE VOLUME: 7 ML (ref 0–100)
PROTEIN, UA POC OHS: NEGATIVE
SPECIFIC GRAVITY, UA POC OHS: 1.01
UROBILINOGEN, UA POC OHS: 0.2

## 2023-10-25 PROCEDURE — 77067 SCR MAMMO BI INCL CAD: CPT | Mod: 26,,, | Performed by: RADIOLOGY

## 2023-10-25 PROCEDURE — 1159F MED LIST DOCD IN RCRD: CPT | Mod: CPTII,S$GLB,,

## 2023-10-25 PROCEDURE — 99204 PR OFFICE/OUTPT VISIT, NEW, LEVL IV, 45-59 MIN: ICD-10-PCS | Mod: S$GLB,,,

## 2023-10-25 PROCEDURE — 77063 BREAST TOMOSYNTHESIS BI: CPT | Mod: 26,,, | Performed by: RADIOLOGY

## 2023-10-25 PROCEDURE — 81003 POCT URINALYSIS(INSTRUMENT): ICD-10-PCS | Mod: QW,S$GLB,,

## 2023-10-25 PROCEDURE — 99999 PR PBB SHADOW E&M-EST. PATIENT-LVL III: CPT | Mod: PBBFAC,,,

## 2023-10-25 PROCEDURE — 1160F PR REVIEW ALL MEDS BY PRESCRIBER/CLIN PHARMACIST DOCUMENTED: ICD-10-PCS | Mod: CPTII,S$GLB,,

## 2023-10-25 PROCEDURE — 51798 US URINE CAPACITY MEASURE: CPT | Mod: S$GLB,,,

## 2023-10-25 PROCEDURE — 77067 SCR MAMMO BI INCL CAD: CPT | Mod: TC,PO

## 2023-10-25 PROCEDURE — 1160F RVW MEDS BY RX/DR IN RCRD: CPT | Mod: CPTII,S$GLB,,

## 2023-10-25 PROCEDURE — 99999 PR PBB SHADOW E&M-EST. PATIENT-LVL III: ICD-10-PCS | Mod: PBBFAC,,,

## 2023-10-25 PROCEDURE — 99204 OFFICE O/P NEW MOD 45 MIN: CPT | Mod: S$GLB,,,

## 2023-10-25 PROCEDURE — 77063 MAMMO DIGITAL SCREENING BILAT WITH TOMO: ICD-10-PCS | Mod: 26,,, | Performed by: RADIOLOGY

## 2023-10-25 PROCEDURE — 3008F PR BODY MASS INDEX (BMI) DOCUMENTED: ICD-10-PCS | Mod: CPTII,S$GLB,,

## 2023-10-25 PROCEDURE — 3008F BODY MASS INDEX DOCD: CPT | Mod: CPTII,S$GLB,,

## 2023-10-25 PROCEDURE — 51798 POCT BLADDER SCAN: ICD-10-PCS | Mod: S$GLB,,,

## 2023-10-25 PROCEDURE — 81003 URINALYSIS AUTO W/O SCOPE: CPT | Mod: QW,S$GLB,,

## 2023-10-25 PROCEDURE — 77067 MAMMO DIGITAL SCREENING BILAT WITH TOMO: ICD-10-PCS | Mod: 26,,, | Performed by: RADIOLOGY

## 2023-10-25 PROCEDURE — 1159F PR MEDICATION LIST DOCUMENTED IN MEDICAL RECORD: ICD-10-PCS | Mod: CPTII,S$GLB,,

## 2023-10-25 RX ORDER — MIRABEGRON 50 MG/1
50 TABLET, FILM COATED, EXTENDED RELEASE ORAL DAILY
Qty: 30 TABLET | Refills: 11 | Status: SHIPPED | OUTPATIENT
Start: 2023-10-25 | End: 2024-10-24

## 2023-10-25 NOTE — PROGRESS NOTES
[facility] Note  Staff: Nunu Avila FNP-C    PCP: MD Kimmie    Chief Complaint: Urge Incontinence    Subjective:        HPI: Funmi Houston is a 58 y.o. female new patient to me presents today for evaluation of urge incontinence. This is not a new proeblem for her and she has been seen by Dr. Sanchez in the past for this. She is currently taking Myrbetriq 25mg daily and has been for >5 years. She states since she had back surgery 6 months ago her urgency and frequency have gotten worse. She states she feels the need to urinate every hour during the day and gets up at least twice during the night to urinate. She denies constipation. She denies dysuria, hematuria, fever, flank pain, abd pain, and difficulty urinating. We discussed increasing the dose of myrbetriq to 50mg and she agrees to this plan. We discussed further evaluation with cystoscopy and urodynamics if this is ineffective.     Questions asked pt during office visit today:  DTF: every hour, NTF: 2 x night  Urgency:Yes, incontinence with urgency? Yes;   DysuriaNo  Gross HematuriaNo  History of UTI: No    History of Kidney Stones?:  No    Constipation issues?:  No    PVR by bladder scan performed by MA today:  7 mL    REVIEW OF SYSTEMS:  Review of Systems   Constitutional: Negative.  Negative for chills and fever.   HENT: Negative.     Eyes: Negative.    Respiratory: Negative.     Cardiovascular: Negative.    Gastrointestinal: Negative.  Negative for abdominal pain, constipation, diarrhea, nausea and vomiting.   Genitourinary:  Positive for frequency and urgency. Negative for dysuria, flank pain and hematuria.   Musculoskeletal: Negative.  Negative for back pain.   Skin: Negative.    Neurological: Negative.    Endo/Heme/Allergies: Negative.    Psychiatric/Behavioral: Negative.         PMHx:  Past Medical History:   Diagnosis Date    Digestive disorder     gerd    Edema     OAB (overactive bladder)     Restless leg syndrome         PSHx:  Past Surgical History:   Procedure Laterality Date    CHOLECYSTECTOMY      COLONOSCOPY N/A 11/30/2020    Procedure: COLONOSCOPY;  Surgeon: Teja Rios Jr., MD;  Location: [facility];  Service: Endoscopy;  Laterality: N/A;    ESOPHAGOGASTRODUODENOSCOPY      ESOPHAGOGASTRODUODENOSCOPY N/A 11/30/2020    Procedure: ESOPHAGOGASTRODUODENOSCOPY (EGD);  Surgeon: Teja Rios Jr., MD;  Location: [facility];  Service: Endoscopy;  Laterality: N/A;    HYSTERECTOMY      LAMINECTOMY USING MINIMALLY INVASIVE TECHNIQUE Right 09/06/2022    Procedure: HEMILAMINECTOMY, SPINE, MINIMALLY INVASIVE- L5-S1;  Surgeon: Prashanth Herr MD;  Location: [facility];  Service: Neurosurgery;  Laterality: Right;    MINIMALLY INVASIVE SURGICAL REMOVAL OF INTERVERTEBRAL DISC OF SPINE USING MICROSCOPE Right 09/06/2022    Procedure: DISCECTOMY, SPINE, MINIMALLY INVASIVE, USING MICROSCOPE- L5-S1;  Surgeon: Prashanth Herr MD;  Location: [facility];  Service: Neurosurgery;  Laterality: Right;    OOPHORECTOMY      TONSILLECTOMY      TOTAL VAGINAL HYSTERECTOMY         Fam Hx:   malignancies: No , gyn malignancies: No   kidney stones: No     Soc Hx:  , lives in [place]    Allergies:  Penicillins    Medications: reviewed     Objective:   There were no vitals filed for this visit.    Physical Exam  Constitutional:       Appearance: Normal appearance.   HENT:      Head: Normocephalic.      Mouth/Throat:      Mouth: Mucous membranes are moist.   Eyes:      Conjunctiva/sclera: Conjunctivae normal.   Pulmonary:      Effort: Pulmonary effort is normal.   Abdominal:      General: There is no distension.      Palpations: Abdomen is soft.      Tenderness: There is no abdominal tenderness. There is no right CVA tenderness or left CVA tenderness.   Musculoskeletal:         General: Normal range of motion.      Cervical back: Normal range of motion.   Skin:     General: Skin is warm.   Neurological:      Mental Status: She is alert and oriented to  person, place, and time.   Psychiatric:         Mood and Affect: Mood normal.         Behavior: Behavior normal.           LABS REVIEW:  UA today:   Color:Clear, Yellow  Spec. Grav.  1.010  PH  6.0  Negative for leukocytes, nitrates, protein, glucose, ketones, urobili, bili  Trace blood    Assessment:       1. Urine frequency    2. Urinary urgency    3. OAB (overactive bladder)    4. Women's annual routine gynecological examination          Plan:      Increase Myrbetriq to 50mg daily- prescribed  If fails, will pursue Cysto with [facility] (Dr. Lilly)    F/u 3 months with [facility]    [facility]: Active    FANNY Schmid

## 2024-01-03 ENCOUNTER — OFFICE VISIT (OUTPATIENT)
Dept: OBSTETRICS AND GYNECOLOGY | Facility: CLINIC | Age: 59
End: 2024-01-03
Payer: COMMERCIAL

## 2024-01-03 VITALS
HEIGHT: 68 IN | DIASTOLIC BLOOD PRESSURE: 72 MMHG | WEIGHT: 276.88 LBS | BODY MASS INDEX: 41.96 KG/M2 | SYSTOLIC BLOOD PRESSURE: 124 MMHG

## 2024-01-03 DIAGNOSIS — Z79.890 HORMONE REPLACEMENT THERAPY (HRT): ICD-10-CM

## 2024-01-03 DIAGNOSIS — Z78.0 MENOPAUSE: ICD-10-CM

## 2024-01-03 DIAGNOSIS — N32.81 OAB (OVERACTIVE BLADDER): ICD-10-CM

## 2024-01-03 DIAGNOSIS — Z90.710 S/P HYSTERECTOMY: ICD-10-CM

## 2024-01-03 DIAGNOSIS — Z01.419 WOMEN'S ANNUAL ROUTINE GYNECOLOGICAL EXAMINATION: Primary | ICD-10-CM

## 2024-01-03 PROCEDURE — 3078F DIAST BP <80 MM HG: CPT | Mod: CPTII,S$GLB,, | Performed by: OBSTETRICS & GYNECOLOGY

## 2024-01-03 PROCEDURE — 99999 PR PBB SHADOW E&M-EST. PATIENT-LVL III: CPT | Mod: PBBFAC,,, | Performed by: OBSTETRICS & GYNECOLOGY

## 2024-01-03 PROCEDURE — 3074F SYST BP LT 130 MM HG: CPT | Mod: CPTII,S$GLB,, | Performed by: OBSTETRICS & GYNECOLOGY

## 2024-01-03 PROCEDURE — 1159F MED LIST DOCD IN RCRD: CPT | Mod: CPTII,S$GLB,, | Performed by: OBSTETRICS & GYNECOLOGY

## 2024-01-03 PROCEDURE — 99386 PREV VISIT NEW AGE 40-64: CPT | Mod: S$GLB,,, | Performed by: OBSTETRICS & GYNECOLOGY

## 2024-01-03 PROCEDURE — 3008F BODY MASS INDEX DOCD: CPT | Mod: CPTII,S$GLB,, | Performed by: OBSTETRICS & GYNECOLOGY

## 2024-01-03 RX ORDER — ESTRADIOL 0.5 MG/1
0.5 TABLET ORAL DAILY
Qty: 90 TABLET | Refills: 3 | Status: SHIPPED | OUTPATIENT
Start: 2024-01-03

## 2024-01-12 ENCOUNTER — OFFICE VISIT (OUTPATIENT)
Dept: UROLOGY | Facility: CLINIC | Age: 59
End: 2024-01-12
Payer: COMMERCIAL

## 2024-01-12 DIAGNOSIS — N32.81 OAB (OVERACTIVE BLADDER): ICD-10-CM

## 2024-01-12 DIAGNOSIS — R35.0 URINE FREQUENCY: Primary | ICD-10-CM

## 2024-01-12 LAB
BILIRUBIN, UA POC OHS: NEGATIVE
BLOOD, UA POC OHS: NEGATIVE
CLARITY, UA POC OHS: CLEAR
COLOR, UA POC OHS: YELLOW
GLUCOSE, UA POC OHS: NEGATIVE
KETONES, UA POC OHS: NEGATIVE
LEUKOCYTES, UA POC OHS: NEGATIVE
NITRITE, UA POC OHS: NEGATIVE
PH, UA POC OHS: 7
POC RESIDUAL URINE VOLUME: 3 ML (ref 0–100)
PROTEIN, UA POC OHS: NEGATIVE
SPECIFIC GRAVITY, UA POC OHS: 1.02
UROBILINOGEN, UA POC OHS: 0.2

## 2024-01-12 PROCEDURE — 99213 OFFICE O/P EST LOW 20 MIN: CPT | Mod: S$GLB,,,

## 2024-01-12 PROCEDURE — 1159F MED LIST DOCD IN RCRD: CPT | Mod: CPTII,S$GLB,,

## 2024-01-12 PROCEDURE — 81003 URINALYSIS AUTO W/O SCOPE: CPT | Mod: QW,S$GLB,,

## 2024-01-12 PROCEDURE — 1160F RVW MEDS BY RX/DR IN RCRD: CPT | Mod: CPTII,S$GLB,,

## 2024-01-12 PROCEDURE — 99999 PR PBB SHADOW E&M-EST. PATIENT-LVL III: CPT | Mod: PBBFAC,,,

## 2024-01-12 PROCEDURE — 51798 US URINE CAPACITY MEASURE: CPT | Mod: S$GLB,,,

## 2024-01-12 NOTE — PROGRESS NOTES
[facility] Note  Staff: Nunu Avila FNP-C    PCP: MD Kimmie    Chief Complaint: OAB Medication Follow Up    Subjective:        HPI: Funmi Houston is a 58 y.o. female presents today for OAB medication follow-up.  At her last office visit we increased her Myrbetriq dosage to 50 mg daily.  She had been taking Myrbetriq 25 mg for over 5 years.  She states the increase in dosage has made great improvement to her symptoms.  She states she is not leaking urine on the way to the bathroom.  She states she is getting up maybe once during the night to urinate.  She is overall pleased with her urinary symptoms.  She denies dysuria, hematuria, fever, flank pain, incontinence, and difficulty urinating.  She denies any side effects from the increase of dosage.    Questions asked pt during office visit today:  DTF: every 2-3 hours, NTF: 1 x night  Urgency:No, incontinence with urgency? No;   DysuriaNo  Gross HematuriaNo    History of Kidney Stones?:  No    Constipation issues?:  No    PVR by bladder scan performed by MA today:  3 mL    REVIEW OF SYSTEMS:  Review of Systems   Constitutional: Negative.  Negative for chills and fever.   HENT: Negative.     Eyes: Negative.    Respiratory: Negative.     Cardiovascular: Negative.    Gastrointestinal: Negative.  Negative for abdominal pain, constipation, diarrhea, nausea and vomiting.   Genitourinary: Negative.  Negative for dysuria, flank pain, frequency, hematuria and urgency.   Musculoskeletal: Negative.  Negative for back pain.   Skin: Negative.    Neurological: Negative.    Endo/Heme/Allergies: Negative.    Psychiatric/Behavioral: Negative.         PMHx:  Past Medical History:   Diagnosis Date    Digestive disorder     gerd    Edema     OAB (overactive bladder)     Restless leg syndrome        PSHx:  Past Surgical History:   Procedure Laterality Date    CHOLECYSTECTOMY      COLONOSCOPY N/A 11/30/2020    Procedure: COLONOSCOPY;  Surgeon: Teja Rios  MD Ellie;  Location: [facility];  Service: Endoscopy;  Laterality: N/A;    ESOPHAGOGASTRODUODENOSCOPY      ESOPHAGOGASTRODUODENOSCOPY N/A 11/30/2020    Procedure: ESOPHAGOGASTRODUODENOSCOPY (EGD);  Surgeon: Teja Rios Jr., MD;  Location: [facility];  Service: Endoscopy;  Laterality: N/A;    HYSTERECTOMY      LAMINECTOMY USING MINIMALLY INVASIVE TECHNIQUE Right 09/06/2022    Procedure: HEMILAMINECTOMY, SPINE, MINIMALLY INVASIVE- L5-S1;  Surgeon: Prashanth Herr MD;  Location: [facility];  Service: Neurosurgery;  Laterality: Right;    MINIMALLY INVASIVE SURGICAL REMOVAL OF INTERVERTEBRAL DISC OF SPINE USING MICROSCOPE Right 09/06/2022    Procedure: DISCECTOMY, SPINE, MINIMALLY INVASIVE, USING MICROSCOPE- L5-S1;  Surgeon: Prashanth Herr MD;  Location: [facility];  Service: Neurosurgery;  Laterality: Right;    OOPHORECTOMY      TONSILLECTOMY      TOTAL VAGINAL HYSTERECTOMY         Fam Hx:   malignancies: No , gyn malignancies: No   kidney stones: No     Soc Hx:  , lives in [place]    Allergies:  Penicillins    Medications: reviewed     Objective:   There were no vitals filed for this visit.    Physical Exam  Constitutional:       Appearance: Normal appearance.   HENT:      Head: Normocephalic.      Mouth/Throat:      Mouth: Mucous membranes are moist.   Eyes:      Conjunctiva/sclera: Conjunctivae normal.   Pulmonary:      Effort: Pulmonary effort is normal.   Abdominal:      General: There is no distension.      Palpations: Abdomen is soft.      Tenderness: There is no abdominal tenderness. There is no right CVA tenderness or left CVA tenderness.   Musculoskeletal:         General: Normal range of motion.      Cervical back: Normal range of motion.   Skin:     General: Skin is warm.   Neurological:      Mental Status: She is alert and oriented to person, place, and time.   Psychiatric:         Mood and Affect: Mood normal.         Behavior: Behavior normal.           LABS REVIEW:  UA today:   Color:Clear,  Yellow  Spec. Grav.  1.020  PH  7.0  Negative for leukocytes, nitrates, protein, glucose, ketones, urobili, bili, and blood.    Assessment:       1. Urine frequency    2. OAB (overactive bladder)          Plan:      Continue Myrbetriq 50mg daily as prescribed    F/u As Needed    [facility]: Active    FANNY Schmid

## 2024-03-01 DIAGNOSIS — M54.16 LUMBAR RADICULOPATHY: ICD-10-CM

## 2024-03-01 RX ORDER — IBUPROFEN 800 MG/1
800 TABLET ORAL 2 TIMES DAILY PRN
Qty: 60 TABLET | Refills: 0 | Status: SHIPPED | OUTPATIENT
Start: 2024-03-01 | End: 2024-04-08

## 2024-03-28 ENCOUNTER — OFFICE VISIT (OUTPATIENT)
Dept: FAMILY MEDICINE | Facility: CLINIC | Age: 59
End: 2024-03-28
Payer: COMMERCIAL

## 2024-03-28 ENCOUNTER — TELEPHONE (OUTPATIENT)
Dept: FAMILY MEDICINE | Facility: CLINIC | Age: 59
End: 2024-03-28

## 2024-03-28 ENCOUNTER — OFFICE VISIT (OUTPATIENT)
Dept: OPTOMETRY | Facility: CLINIC | Age: 59
End: 2024-03-28
Payer: COMMERCIAL

## 2024-03-28 VITALS
SYSTOLIC BLOOD PRESSURE: 128 MMHG | HEART RATE: 110 BPM | WEIGHT: 284.81 LBS | HEIGHT: 68 IN | BODY MASS INDEX: 43.17 KG/M2 | TEMPERATURE: 98 F | OXYGEN SATURATION: 96 % | DIASTOLIC BLOOD PRESSURE: 82 MMHG

## 2024-03-28 DIAGNOSIS — B02.39 HERPES ZOSTER BLEPHARITIS: Primary | ICD-10-CM

## 2024-03-28 DIAGNOSIS — E66.01 CLASS 3 OBESITY: ICD-10-CM

## 2024-03-28 DIAGNOSIS — B02.22 TRIGEMINAL HERPES ZOSTER: Primary | ICD-10-CM

## 2024-03-28 PROBLEM — E66.813 CLASS 3 OBESITY: Status: ACTIVE | Noted: 2024-03-28

## 2024-03-28 PROCEDURE — 3008F BODY MASS INDEX DOCD: CPT | Mod: CPTII,S$GLB,, | Performed by: INTERNAL MEDICINE

## 2024-03-28 PROCEDURE — 99203 OFFICE O/P NEW LOW 30 MIN: CPT | Mod: S$GLB,,, | Performed by: OPTOMETRIST

## 2024-03-28 PROCEDURE — 99999 PR PBB SHADOW E&M-EST. PATIENT-LVL IV: CPT | Mod: PBBFAC,,, | Performed by: INTERNAL MEDICINE

## 2024-03-28 PROCEDURE — 3079F DIAST BP 80-89 MM HG: CPT | Mod: CPTII,S$GLB,, | Performed by: INTERNAL MEDICINE

## 2024-03-28 PROCEDURE — 1160F RVW MEDS BY RX/DR IN RCRD: CPT | Mod: CPTII,S$GLB,, | Performed by: INTERNAL MEDICINE

## 2024-03-28 PROCEDURE — 99214 OFFICE O/P EST MOD 30 MIN: CPT | Mod: S$GLB,,, | Performed by: INTERNAL MEDICINE

## 2024-03-28 PROCEDURE — 3074F SYST BP LT 130 MM HG: CPT | Mod: CPTII,S$GLB,, | Performed by: INTERNAL MEDICINE

## 2024-03-28 PROCEDURE — 1159F MED LIST DOCD IN RCRD: CPT | Mod: CPTII,S$GLB,, | Performed by: OPTOMETRIST

## 2024-03-28 PROCEDURE — 1159F MED LIST DOCD IN RCRD: CPT | Mod: CPTII,S$GLB,, | Performed by: INTERNAL MEDICINE

## 2024-03-28 PROCEDURE — 99999 PR PBB SHADOW E&M-EST. PATIENT-LVL III: CPT | Mod: PBBFAC,,, | Performed by: OPTOMETRIST

## 2024-03-28 PROCEDURE — 1160F RVW MEDS BY RX/DR IN RCRD: CPT | Mod: CPTII,S$GLB,, | Performed by: OPTOMETRIST

## 2024-03-28 RX ORDER — VALACYCLOVIR HYDROCHLORIDE 1 G/1
1000 TABLET, FILM COATED ORAL 3 TIMES DAILY
Qty: 21 TABLET | Refills: 1 | Status: SHIPPED | OUTPATIENT
Start: 2024-03-28 | End: 2024-04-04

## 2024-03-28 RX ORDER — GABAPENTIN 100 MG/1
100-200 CAPSULE ORAL 3 TIMES DAILY
Qty: 60 CAPSULE | Refills: 1 | Status: SHIPPED | OUTPATIENT
Start: 2024-03-28 | End: 2025-03-28

## 2024-03-28 NOTE — TELEPHONE ENCOUNTER
Can we get assistance with the patient     She has shingles on her eye and lid and it is painful.    Please advise I have her scheduled for 4/15 but it is concerning per dr. Small

## 2024-03-28 NOTE — PROGRESS NOTES
Subjective:       Patient ID: Funmi Houston is a 58 y.o. female.  Chief Complaint: Herpes Zoster     HPI    C/o general headache for 2 days.  Woke this am with a burning rash on her cheek similar previous shingles outbreaks.  New this time is some itching at the corner of her right eye right at the junction of the upper and lower eyelid.      Obese - morbid.     Assessment:       1. Trigeminal herpes zoster    2. Class 3 obesity        Plan:       Trigeminal herpes zoster  -     Ambulatory referral/consult to Optometry; Future; Expected date: 04/04/2024  -     valACYclovir (VALTREX) 1000 MG tablet; Take 1 tablet (1,000 mg total) by mouth 3 (three) times daily. for 7 days  Dispense: 21 tablet; Refill: 1  -     gabapentin (NEURONTIN) 100 MG capsule; Take 1-2 capsules (100-200 mg total) by mouth 3 (three) times daily.  Dispense: 60 capsule; Refill: 1    Class 3 obesity            Will try to get her in today for eye eval  Continue current management and monitor.  Other diagnoses were reviewed and found stable and will continue to monitor.  Counseled on regular exercise, maintenance of a healthy weight, balanced diet rich in fruits/vegetables and lean protein, and avoidance of unhealthy habits like smoking and excessive alcohol intake.   Also, counseled on importance of being compliant with medication, health appointments, diet and exercise.     Establishing with Dr CURTIS 4/12      Medication List with Changes/Refills   New Medications    GABAPENTIN (NEURONTIN) 100 MG CAPSULE    Take 1-2 capsules (100-200 mg total) by mouth 3 (three) times daily.    VALACYCLOVIR (VALTREX) 1000 MG TABLET    Take 1 tablet (1,000 mg total) by mouth 3 (three) times daily. for 7 days   Current Medications    ESTRADIOL (ESTRACE) 0.5 MG TABLET    Take 1 tablet (0.5 mg total) by mouth once daily.    FAMOTIDINE (PEPCID) 40 MG TABLET    TAKE 1 TABLET BY MOUTH NIGHTLY AS NEEDED FOR HEARTBURN.    IBUPROFEN (ADVIL,MOTRIN) 800 MG TABLET    TAKE 1  TABLET BY MOUTH 2 TIMES DAILY AS NEEDED FOR PAIN.    MIRABEGRON (MYRBETRIQ) 50 MG TB24    Take 1 tablet (50 mg total) by mouth once daily.    OMEPRAZOLE (PRILOSEC) 40 MG CAPSULE    Take 1 capsule (40 mg total) by mouth once daily.    VALACYCLOVIR (VALTREX) 500 MG TABLET    Take 1 tablet (500 mg total) by mouth once daily.   Discontinued Medications    MULTIVITAMIN (THERAGRAN) PER TABLET    Take 1 tablet by mouth once daily.       BP Readings from Last 3 Encounters:   03/28/24 128/82   01/03/24 124/72   10/23/23 136/80     Hemoglobin A1C   Date Value Ref Range Status   08/31/2022 5.8 (H) 0.0 - 5.6 % Final     Comment:     Reference Interval:  5.0 - 5.6 Normal   5.7 - 6.4 High Risk   > 6.5 Diabetic      Hgb A1c results are standardized based on the (NGSP) National   Glycohemoglobin Standardization Program.      Hemoglobin A1C levels are related to mean serum/plasma glucose   during the preceding 2-3 months.          Lab Results   Component Value Date    TSH 0.599 10/06/2020     Lab Results   Component Value Date    LDLCALC 116.6 10/02/2021    LDLCALC 114.4 09/24/2020    LDLCALC 130.0 07/31/2019     Lab Results   Component Value Date    TRIG 97 10/02/2021    TRIG 113 09/24/2020    TRIG 95 07/31/2019     Wt Readings from Last 3 Encounters:   03/28/24 129.2 kg (284 lb 13.4 oz)   01/03/24 125.6 kg (276 lb 14.4 oz)   10/25/23 124.6 kg (274 lb 11.1 oz)     Lab Results   Component Value Date    HGB 14.6 08/31/2022    HCT 43.7 08/31/2022    WBC 8.44 08/31/2022    ALT 27 10/02/2021    AST 19 10/02/2021     08/31/2022    K 4.5 08/31/2022    CREATININE 0.74 08/31/2022           Review of Systems        Objective:      Vitals:    03/28/24 0742   BP: 128/82   Pulse: 110   Temp: 97.7 °F (36.5 °C)     Physical Exam  HENT:      Head:      Comments: Right upper cheek + 2 cm round erythematous macular papular lesion with satellite borders and confluent center.

## 2024-03-28 NOTE — PROGRESS NOTES
HPI    Urgent care pt here for shingles OD DLS- 01/2024    Pt states she woke up with shingles around OD, outer corner of OD is   itchy, watering, redness and burning. Pain in OD is a 6/10 today.   Pt went to her PCP this morning and they started her on valtrex.  Pt has not been using any eye meds.   Pt was recently DX with dry AMD.   Prev had shingles but resolved after 1 week with meds  No decreased vision, no redness  Last edited by Michael Ogden, OD on 3/28/2024 12:11 PM.            Assessment /Plan     For exam results, see Encounter Report.    Herpes zoster blepharitis      One day history, started on Valtrex, prev episodes resolved quickly on orals, some lid swelling temporally, no Hutchinsons sign, eye white and quiet, Monitor condition. Patient to report any changes. RTC 1 week recheck if any worsening or new symptoms. RTC full eye exam in future, wants to establish care for armd diagnosis. Ok for artificial tears if any eye irritation from lid involvement

## 2024-04-08 DIAGNOSIS — M54.16 LUMBAR RADICULOPATHY: ICD-10-CM

## 2024-04-08 RX ORDER — IBUPROFEN 800 MG/1
800 TABLET ORAL 2 TIMES DAILY PRN
Qty: 60 TABLET | Refills: 0 | Status: SHIPPED | OUTPATIENT
Start: 2024-04-08 | End: 2024-05-06

## 2024-04-08 NOTE — TELEPHONE ENCOUNTER
Refill Routing Note   Medication(s) are not appropriate for processing by [facility] for the following reason(s):        Outside of protocol  Responsible provider unclear    [facility] action(s):  Route        Medication Therapy Plan: no pcp listed on profile      Appointments  past 12m or future 3m with PCP    Date Provider   Last Visit   Visit date not found Richard Palomo MD   Next Visit   4/12/2024 Richard Palomo MD   ED visits in past 90 days: 0        Note composed:8:25 AM 04/08/2024

## 2024-05-06 DIAGNOSIS — M54.16 LUMBAR RADICULOPATHY: ICD-10-CM

## 2024-05-06 RX ORDER — IBUPROFEN 800 MG/1
800 TABLET ORAL 2 TIMES DAILY PRN
Qty: 60 TABLET | Refills: 0 | Status: SHIPPED | OUTPATIENT
Start: 2024-05-06

## 2024-05-06 NOTE — TELEPHONE ENCOUNTER
Refill Routing Note   Medication(s) are not appropriate for processing by [facility] for the following reason(s):        Outside of protocol  Responsible provider unclear    [facility] action(s):  Route        Medication Therapy Plan: no pcp listed on profile      Appointments  past 12m or future 3m with PCP    Date Provider   Last Visit   Visit date not found Richard Palomo MD   Next Visit   5/24/2024 Richard Palomo MD   ED visits in past 90 days: 0        Note composed:7:08 AM 05/06/2024

## 2024-05-24 ENCOUNTER — OFFICE VISIT (OUTPATIENT)
Dept: FAMILY MEDICINE | Facility: CLINIC | Age: 59
End: 2024-05-24
Payer: COMMERCIAL

## 2024-05-24 VITALS
SYSTOLIC BLOOD PRESSURE: 136 MMHG | DIASTOLIC BLOOD PRESSURE: 89 MMHG | OXYGEN SATURATION: 96 % | WEIGHT: 282.63 LBS | HEART RATE: 74 BPM | TEMPERATURE: 98 F | BODY MASS INDEX: 42.83 KG/M2 | HEIGHT: 68 IN

## 2024-05-24 DIAGNOSIS — B00.1 FEVER BLISTER: ICD-10-CM

## 2024-05-24 DIAGNOSIS — Z00.00 WELL ADULT EXAM: Primary | ICD-10-CM

## 2024-05-24 DIAGNOSIS — R73.03 PREDIABETES: ICD-10-CM

## 2024-05-24 DIAGNOSIS — K21.9 GASTROESOPHAGEAL REFLUX DISEASE, UNSPECIFIED WHETHER ESOPHAGITIS PRESENT: ICD-10-CM

## 2024-05-24 DIAGNOSIS — E66.01 CLASS 3 SEVERE OBESITY WITH BODY MASS INDEX (BMI) OF 40.0 TO 44.9 IN ADULT, UNSPECIFIED OBESITY TYPE, UNSPECIFIED WHETHER SERIOUS COMORBIDITY PRESENT: ICD-10-CM

## 2024-05-24 PROCEDURE — 3008F BODY MASS INDEX DOCD: CPT | Mod: CPTII,S$GLB,, | Performed by: FAMILY MEDICINE

## 2024-05-24 PROCEDURE — 3079F DIAST BP 80-89 MM HG: CPT | Mod: CPTII,S$GLB,, | Performed by: FAMILY MEDICINE

## 2024-05-24 PROCEDURE — 99396 PREV VISIT EST AGE 40-64: CPT | Mod: S$GLB,,, | Performed by: FAMILY MEDICINE

## 2024-05-24 PROCEDURE — 99999 PR PBB SHADOW E&M-EST. PATIENT-LVL IV: CPT | Mod: PBBFAC,,, | Performed by: FAMILY MEDICINE

## 2024-05-24 PROCEDURE — 1160F RVW MEDS BY RX/DR IN RCRD: CPT | Mod: CPTII,S$GLB,, | Performed by: FAMILY MEDICINE

## 2024-05-24 PROCEDURE — 1159F MED LIST DOCD IN RCRD: CPT | Mod: CPTII,S$GLB,, | Performed by: FAMILY MEDICINE

## 2024-05-24 PROCEDURE — 3075F SYST BP GE 130 - 139MM HG: CPT | Mod: CPTII,S$GLB,, | Performed by: FAMILY MEDICINE

## 2024-05-24 RX ORDER — VIT C/E/ZN/COPPR/LUTEIN/ZEAXAN 250MG-90MG
CAPSULE ORAL
COMMUNITY
Start: 2024-01-10

## 2024-05-24 RX ORDER — VALACYCLOVIR HYDROCHLORIDE 1 G/1
1000 TABLET, FILM COATED ORAL DAILY
Qty: 90 TABLET | Refills: 3 | Status: SHIPPED | OUTPATIENT
Start: 2024-05-24 | End: 2025-05-24

## 2024-05-24 NOTE — PROGRESS NOTES
"Subjective:       Patient ID: Funmi Houston is a 58 y.o. female.    Chief Complaint: Establish Care    Here today to establish care with a new PCP.   She was seeing Dr. Burks who has left [facility].      Patient here today for annual well adult exam.   Tries to eat a healthy diet (protein and veg).  Exercises when she can.    Immunizations: up to date  Last Lab work: 2022  Colon Ca screening: Colonoscopy 2020   Breast Ca Screening: MMG 2023  Cervical Ca Screening: s/p Hyst    GERD:  she is on Prilosec and Pepcid.  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  Outbreaks about every 3-5 years.  Recently had an outbreak (dx with Shingles).  She has been on Valtrex for supression.  Had been on 1000 mg daily and changed to 500 a year ago.  Prediabetes:  HgA1c of 5.8 in 2022      Review of Systems   Constitutional:  Positive for fatigue. Negative for activity change, appetite change and fever.   Respiratory:  Negative for cough, shortness of breath and wheezing.    Cardiovascular:  Negative for chest pain and palpitations.   Gastrointestinal:  Negative for abdominal pain, constipation, diarrhea and nausea.   Skin:  Negative for pallor and rash.   Neurological:  Negative for dizziness, syncope, light-headedness and headaches.   Psychiatric/Behavioral:  The patient is not nervous/anxious.        Objective:      Vitals:    05/24/24 1057   BP: 136/89   Pulse: 74   Temp: 98 °F (36.7 °C)   TempSrc: Oral   SpO2: 96%   Weight: 128.2 kg (282 lb 10.1 oz)   Height: 5' 8" (1.727 m)      Physical Exam  Constitutional:       General: She is not in acute distress.     Appearance: She is obese.   Cardiovascular:      Rate and Rhythm: Normal rate and regular rhythm.      Heart sounds: Normal heart sounds. No murmur heard.  Pulmonary:      Effort: Pulmonary effort is normal. No respiratory distress.      Breath sounds: Normal breath sounds. No wheezing, rhonchi or rales.   Skin:     General: Skin is warm and dry. "   Neurological:      General: No focal deficit present.      Mental Status: She is alert.   Psychiatric:         Mood and Affect: Mood normal.         Behavior: Behavior normal.         Thought Content: Thought content normal.         Results for orders placed or performed in visit on 01/12/24   POCT Urinalysis(Instrument)   Result Value Ref Range    Color, POC UA Yellow Yellow, Straw, Colorless    Clarity, POC UA Clear Clear    Glucose, POC UA Negative Negative    Bilirubin, POC UA Negative Negative    Ketones, POC UA Negative Negative    Spec Grav POC UA 1.020 1.005 - 1.030    Blood, POC UA Negative Negative    pH, POC UA 7.0 5.0 - 8.0    Protein, POC UA Negative Negative    Urobilinogen, POC UA 0.2 <=1.0    Nitrite, POC UA Negative Negative    WBC, POC UA Negative Negative   POCT Bladder Scan   Result Value Ref Range    POC Residual Urine Volume 3 0 - 100 mL      Assessment:       1. Well adult exam    2. Gastroesophageal reflux disease, unspecified whether esophagitis present    3. Class 3 severe obesity with body mass index (BMI) of 40.0 to 44.9 in adult, unspecified obesity type, unspecified whether serious comorbidity present    4. Fever blister    5. Prediabetes        Plan:       Well adult exam  -     CBC Auto Differential; Future; Expected date: 05/24/2024  -     Comprehensive Metabolic Panel; Future; Expected date: 05/24/2024  -     Hemoglobin A1C; Future; Expected date: 05/24/2024  -     Lipid Panel; Future; Expected date: 05/24/2024  -     TSH; Future; Expected date: 05/24/2024  -     Urinalysis, Reflex to Urine Culture Urine, Clean Catch; Future; Expected date: 05/24/2024  Continue to work on dietary improvements (decrease overall calorie intake, decrease sugar and carb intake, decrease animal protein intake)  Continue to exercise at least 30-40 minutes, 3 times per week  Immunizations were discussed and discussed the timing of Shingrix assuming this was a shingles outbreak  Preventative exams were  discussed and up to date     Gastroesophageal reflux disease, unspecified whether esophagitis present  Continue current medication  Class 3 severe obesity with body mass index (BMI) of 40.0 to 44.9 in adult, unspecified obesity type, unspecified whether serious comorbidity present  We discussed possible GLP for weight loss.  She will think about.  Fever blister  -     valACYclovir (VALTREX) 1000 MG tablet; Take 1 tablet (1,000 mg total) by mouth once daily.  Dispense: 90 tablet; Refill: 3  Resume Valtrex 1000 mg for suppression.  Discussed a possible viral culture with next outbreak as I would question Shingles due to the frequency of occurrence.        Medication List with Changes/Refills   Current Medications    ESTRADIOL (ESTRACE) 0.5 MG TABLET    Take 1 tablet (0.5 mg total) by mouth once daily.    FAMOTIDINE (PEPCID) 40 MG TABLET    TAKE 1 TABLET BY MOUTH NIGHTLY AS NEEDED FOR HEARTBURN.    GABAPENTIN (NEURONTIN) 100 MG CAPSULE    Take 1-2 capsules (100-200 mg total) by mouth 3 (three) times daily.    IBUPROFEN (ADVIL,MOTRIN) 800 MG TABLET    TAKE 1 TABLET BY MOUTH TWICE A DAY AS NEEDED FOR PAIN    MIRABEGRON (MYRBETRIQ) 50 MG TB24    Take 1 tablet (50 mg total) by mouth once daily.    OMEPRAZOLE (PRILOSEC) 40 MG CAPSULE    Take 1 capsule (40 mg total) by mouth once daily.    VIT C,U-MP-TYLYF-LUTEIN-ZEAXAN (PRESERVISION AREDS-2) 250-90-40-1 MG CAP       Changed and/or Refilled Medications    Modified Medication Previous Medication    VALACYCLOVIR (VALTREX) 1000 MG TABLET valACYclovir (VALTREX) 500 MG tablet       Take 1 tablet (1,000 mg total) by mouth once daily.    Take 1 tablet (500 mg total) by mouth once daily.

## 2024-05-28 ENCOUNTER — PATIENT MESSAGE (OUTPATIENT)
Dept: FAMILY MEDICINE | Facility: CLINIC | Age: 59
End: 2024-05-28
Payer: COMMERCIAL

## 2024-06-12 ENCOUNTER — HOSPITAL ENCOUNTER (OUTPATIENT)
Dept: RADIOLOGY | Facility: HOSPITAL | Age: 59
Discharge: HOME | End: 2024-06-12
Attending: NURSE PRACTITIONER
Payer: COMMERCIAL

## 2024-06-12 ENCOUNTER — OFFICE VISIT (OUTPATIENT)
Dept: FAMILY MEDICINE | Facility: CLINIC | Age: 59
End: 2024-06-12
Payer: COMMERCIAL

## 2024-06-12 VITALS
TEMPERATURE: 98 F | OXYGEN SATURATION: 96 % | BODY MASS INDEX: 42.6 KG/M2 | HEART RATE: 82 BPM | HEIGHT: 68 IN | DIASTOLIC BLOOD PRESSURE: 82 MMHG | WEIGHT: 281.06 LBS | SYSTOLIC BLOOD PRESSURE: 148 MMHG

## 2024-06-12 DIAGNOSIS — M79.605 PAIN AND SWELLING OF LEFT LOWER EXTREMITY: ICD-10-CM

## 2024-06-12 DIAGNOSIS — M79.605 PAIN AND SWELLING OF LEFT LOWER EXTREMITY: Primary | ICD-10-CM

## 2024-06-12 DIAGNOSIS — M79.89 PAIN AND SWELLING OF LEFT LOWER EXTREMITY: ICD-10-CM

## 2024-06-12 DIAGNOSIS — L03.116 CELLULITIS OF LEFT LOWER EXTREMITY: ICD-10-CM

## 2024-06-12 DIAGNOSIS — S80.12XA HEMATOMA OF LEFT LOWER EXTREMITY, INITIAL ENCOUNTER: ICD-10-CM

## 2024-06-12 DIAGNOSIS — M79.89 PAIN AND SWELLING OF LEFT LOWER EXTREMITY: Primary | ICD-10-CM

## 2024-06-12 PROCEDURE — 99214 OFFICE O/P EST MOD 30 MIN: CPT | Mod: S$GLB,,, | Performed by: NURSE PRACTITIONER

## 2024-06-12 PROCEDURE — 93971 EXTREMITY STUDY: CPT | Mod: 26,LT,, | Performed by: RADIOLOGY

## 2024-06-12 PROCEDURE — 1159F MED LIST DOCD IN RCRD: CPT | Mod: CPTII,S$GLB,, | Performed by: NURSE PRACTITIONER

## 2024-06-12 PROCEDURE — 3044F HG A1C LEVEL LT 7.0%: CPT | Mod: CPTII,S$GLB,, | Performed by: NURSE PRACTITIONER

## 2024-06-12 PROCEDURE — 93971 EXTREMITY STUDY: CPT | Mod: TC,PO,LT

## 2024-06-12 PROCEDURE — 3077F SYST BP >= 140 MM HG: CPT | Mod: CPTII,S$GLB,, | Performed by: NURSE PRACTITIONER

## 2024-06-12 PROCEDURE — 3079F DIAST BP 80-89 MM HG: CPT | Mod: CPTII,S$GLB,, | Performed by: NURSE PRACTITIONER

## 2024-06-12 PROCEDURE — 3008F BODY MASS INDEX DOCD: CPT | Mod: CPTII,S$GLB,, | Performed by: NURSE PRACTITIONER

## 2024-06-12 PROCEDURE — 99999 PR PBB SHADOW E&M-EST. PATIENT-LVL IV: CPT | Mod: PBBFAC,,, | Performed by: NURSE PRACTITIONER

## 2024-06-12 RX ORDER — DOXYCYCLINE HYCLATE 100 MG
100 TABLET ORAL EVERY 12 HOURS
Qty: 14 TABLET | Refills: 0 | Status: SHIPPED | OUTPATIENT
Start: 2024-06-12 | End: 2024-06-19

## 2024-06-12 NOTE — PROGRESS NOTES
Subjective:       Patient ID: Funmi Houston is a 58 y.o. female.    Chief Complaint: Leg Pain    HPI  Patient fell backward off of step stool ~2-3 weeks ago. She is unsure of leg trauma but reports localized swelling and tenderness below left knee. She reports gradually worsening distal/generalized swelling and tenderness. She reports swelling so significant 3-4 days ago she developed blisters which she then popped.       Vitals:    06/12/24 0808   BP: (!) 148/82   Pulse: 82   Temp: 97.7 °F (36.5 °C)     Review of Systems   Constitutional:  Negative for fever.   Respiratory:  Negative for cough.    Cardiovascular:  Positive for leg swelling. Negative for chest pain.   Skin:  Positive for color change.       Past Medical History:   Diagnosis Date    Digestive disorder     gerd    Edema     OAB (overactive bladder)     Restless leg syndrome      Objective:      Physical Exam  Constitutional:       General: She is not in acute distress.     Appearance: She is well-developed. She is not ill-appearing, toxic-appearing or diaphoretic.   HENT:      Right Ear: Hearing normal.      Left Ear: Hearing normal.   Cardiovascular:      Rate and Rhythm: Normal rate.   Pulmonary:      Effort: No tachypnea or respiratory distress.   Musculoskeletal:      Left lower leg: Swelling present.        Legs:       Comments: +hematoma  distal erythema with mild warmth    Skin:     Coloration: Skin is not pale.   Neurological:      Mental Status: She is alert and oriented to person, place, and time.   Psychiatric:         Speech: Speech normal.         Behavior: Behavior normal.         Thought Content: Thought content normal.         Judgment: Judgment normal.         Assessment:       1. Pain and swelling of left lower extremity    2. Hematoma of left lower extremity, initial encounter    3. Cellulitis of left lower extremity        Plan:       Pain and swelling of left lower extremity  -     US Lower Extremity Veins Left; Future; Expected  date: 06/12/2024    Hematoma of left lower extremity, initial encounter    Cellulitis of left lower extremity  -     doxycycline (VIBRA-TABS) 100 MG tablet; Take 1 tablet (100 mg total) by mouth every 12 (twelve) hours. for 7 days  Dispense: 14 tablet; Refill: 0          Rule out DVT  Start doxycycline for cellulitis  Elevation, compression stockings   education provided on supportive care, symptom monitoring and return precautions     Follow up for further evaluation if s/s worsen, fail to improve, or new symptoms arise.    Medication List with Changes/Refills   New Medications    DOXYCYCLINE (VIBRA-TABS) 100 MG TABLET    Take 1 tablet (100 mg total) by mouth every 12 (twelve) hours. for 7 days   Current Medications    ESTRADIOL (ESTRACE) 0.5 MG TABLET    Take 1 tablet (0.5 mg total) by mouth once daily.    FAMOTIDINE (PEPCID) 40 MG TABLET    TAKE 1 TABLET BY MOUTH NIGHTLY AS NEEDED FOR HEARTBURN.    GABAPENTIN (NEURONTIN) 100 MG CAPSULE    Take 1-2 capsules (100-200 mg total) by mouth 3 (three) times daily.    IBUPROFEN (ADVIL,MOTRIN) 800 MG TABLET    TAKE 1 TABLET BY MOUTH TWICE A DAY AS NEEDED FOR PAIN    MIRABEGRON (MYRBETRIQ) 50 MG TB24    Take 1 tablet (50 mg total) by mouth once daily.    OMEPRAZOLE (PRILOSEC) 40 MG CAPSULE    Take 1 capsule (40 mg total) by mouth once daily.    VALACYCLOVIR (VALTREX) 1000 MG TABLET    Take 1 tablet (1,000 mg total) by mouth once daily.    VIT C,O-JW-VWBFM-LUTEIN-ZEAXAN (PRESERVISION AREDS-2) 250-90-40-1 MG CAP

## 2024-08-15 DIAGNOSIS — K21.9 GASTROESOPHAGEAL REFLUX DISEASE, UNSPECIFIED WHETHER ESOPHAGITIS PRESENT: ICD-10-CM

## 2024-08-15 RX ORDER — OMEPRAZOLE 40 MG/1
40 CAPSULE, DELAYED RELEASE ORAL
Qty: 90 CAPSULE | Refills: 3 | Status: SHIPPED | OUTPATIENT
Start: 2024-08-15

## 2024-08-15 NOTE — TELEPHONE ENCOUNTER
Refill Routing Note   Medication(s) are not appropriate for processing by [facility] for the following reason(s):        No active prescription written by provider    [facility] action(s):  Defer               Appointments  past 12m or future 3m with PCP    Date Provider   Last Visit   5/24/2024 Richard Palomo MD   Next Visit   Visit date not found Richard Palomo MD   ED visits in past 90 days: 0        Note composed:8:15 AM 08/15/2024

## 2024-09-24 DIAGNOSIS — B02.22 TRIGEMINAL HERPES ZOSTER: ICD-10-CM

## 2024-09-24 NOTE — TELEPHONE ENCOUNTER
No care due was identified.  Health [facility] Embedded Care Due Messages. Reference number: [id number].   9/24/2024 12:06:59 PM CDT

## 2024-09-24 NOTE — TELEPHONE ENCOUNTER
Refill Routing Note   Medication(s) are not appropriate for processing by [facility] for the following reason(s):        Outside of protocol    [facility] action(s):  Route             Appointments  past 12m or future 3m with PCP    Date Provider   Last Visit   5/24/2024 Richard Palomo MD   Next Visit   Visit date not found Richard Palomo MD   ED visits in past 90 days: 0        Note composed:6:27 PM 09/24/2024

## 2024-09-25 RX ORDER — GABAPENTIN 100 MG/1
100-200 CAPSULE ORAL 3 TIMES DAILY
Qty: 180 CAPSULE | Refills: 11 | Status: SHIPPED | OUTPATIENT
Start: 2024-09-25 | End: 2025-09-25

## 2024-10-29 DIAGNOSIS — M54.16 LUMBAR RADICULOPATHY: ICD-10-CM

## 2024-10-29 RX ORDER — IBUPROFEN 800 MG/1
800 TABLET ORAL 2 TIMES DAILY PRN
Qty: 60 TABLET | Refills: 0 | Status: SHIPPED | OUTPATIENT
Start: 2024-10-29

## 2024-11-26 DIAGNOSIS — M54.16 LUMBAR RADICULOPATHY: ICD-10-CM

## 2024-11-26 RX ORDER — IBUPROFEN 800 MG/1
800 TABLET ORAL 2 TIMES DAILY PRN
Qty: 60 TABLET | Refills: 0 | Status: SHIPPED | OUTPATIENT
Start: 2024-11-26

## 2024-11-26 NOTE — TELEPHONE ENCOUNTER
Refill Routing Note   Medication(s) are not appropriate for processing by [facility] for the following reason(s):        Outside of protocol    [facility] action(s):  Route             Appointments  past 12m or future 3m with PCP    Date Provider   Last Visit   5/24/2024 Richard Palomo MD   Next Visit   Visit date not found Richard Palomo MD   ED visits in past 90 days: 0        Note composed:10:43 AM 11/26/2024

## 2024-11-26 NOTE — TELEPHONE ENCOUNTER
No care due was identified.  Health [facility] Embedded Care Due Messages. Reference number: [id number].   11/26/2024 2:04:20 AM CST

## 2024-12-14 DIAGNOSIS — K21.9 GASTROESOPHAGEAL REFLUX DISEASE, UNSPECIFIED WHETHER ESOPHAGITIS PRESENT: ICD-10-CM

## 2024-12-15 RX ORDER — FAMOTIDINE 40 MG/1
TABLET, FILM COATED ORAL
Qty: 90 TABLET | Refills: 3 | Status: SHIPPED | OUTPATIENT
Start: 2024-12-15

## 2024-12-22 DIAGNOSIS — M54.16 LUMBAR RADICULOPATHY: ICD-10-CM

## 2024-12-22 NOTE — TELEPHONE ENCOUNTER
No care due was identified.  [facility] Embedded Care Due Messages. Reference number: [id number].   12/22/2024 12:43:00 AM CST

## 2024-12-23 RX ORDER — IBUPROFEN 800 MG/1
800 TABLET ORAL 2 TIMES DAILY PRN
Qty: 60 TABLET | Refills: 0 | Status: SHIPPED | OUTPATIENT
Start: 2024-12-23

## 2025-01-26 DIAGNOSIS — M54.16 LUMBAR RADICULOPATHY: ICD-10-CM

## 2025-01-26 RX ORDER — IBUPROFEN 800 MG/1
800 TABLET ORAL 2 TIMES DAILY PRN
Qty: 60 TABLET | Refills: 0 | Status: SHIPPED | OUTPATIENT
Start: 2025-01-26

## 2025-01-26 NOTE — TELEPHONE ENCOUNTER
No care due was identified.  Health [facility] Embedded Care Due Messages. Reference number: [id number].   1/26/2025 12:13:57 AM CST

## 2025-05-01 DIAGNOSIS — B00.1 FEVER BLISTER: ICD-10-CM

## 2025-05-01 RX ORDER — VALACYCLOVIR HYDROCHLORIDE 1 G/1
1000 TABLET, FILM COATED ORAL DAILY
Qty: 90 TABLET | Refills: 0 | Status: SHIPPED | OUTPATIENT
Start: 2025-05-01 | End: 2025-07-30

## 2025-05-01 NOTE — TELEPHONE ENCOUNTER
Provider Staff:  Action required for this patient    Requires labs      Please see care gap opportunities below in Care Due Message.    Thanks!  [facility]     Appointments      Date Provider   Last Visit   5/24/2024 Richard Palomo MD   Next Visit   5/26/2025 Richard Palomo MD     Refill Decision Note   Funmi Houston  is requesting a refill authorization.  Brief Assessment and Rationale for Refill:  Approve     Medication Therapy Plan:         Comments:     Note composed:10:32 AM 05/01/2025

## 2025-05-01 NOTE — TELEPHONE ENCOUNTER
Care Due:                  Date            Visit Type   Department     Provider  --------------------------------------------------------------------------------                                             [facility]  Last Visit: 05-      [facility]          MONY Palomo                               -                              PRIMARY      [facility]  Next Visit: 05-      CARE ([facility])   MEDICINE       Richard Palomo                                                            Last  Test          Frequency    Reason                     Performed    Due Date  --------------------------------------------------------------------------------    CBC.........  12 months..  ibuprofen, valACYclovir..  05- 05-    Cr..........  12 months..  ibuprofen, valACYclovir..  05- 05-    Health [facility] Embedded Care Due Messages. Reference number: [id number].   5/01/2025 9:38:38 AM CDT

## 2025-07-31 DIAGNOSIS — B00.1 FEVER BLISTER: ICD-10-CM

## 2025-07-31 RX ORDER — VALACYCLOVIR HYDROCHLORIDE 1 G/1
1000 TABLET, FILM COATED ORAL DAILY
Qty: 90 TABLET | Refills: 0 | Status: SHIPPED | OUTPATIENT
Start: 2025-07-31

## 2025-07-31 NOTE — TELEPHONE ENCOUNTER
Refill Routing Note   Medication(s) are not appropriate for processing by [facility] for the following reason(s):        Required labs outdated    [facility] action(s):  Defer   Requires appointment : Yes     Requires labs : Yes             Appointments  past 12m or future 3m with PCP    Date Provider   Last Visit   5/24/2024 Richard Palomo MD   Next Visit   Visit date not found Richard Palomo MD   ED visits in past 90 days: 0        Note composed:10:04 AM 07/31/2025

## 2025-07-31 NOTE — TELEPHONE ENCOUNTER
Care Due:                  Date            Visit Type   Department     Provider  --------------------------------------------------------------------------------                                             [facility]  Last Visit: 05-      [facility]       Richard Palomo  Next Visit: None Scheduled  None         None Found                                                            Last  Test          Frequency    Reason                     Performed    Due Date  --------------------------------------------------------------------------------    Office Visit  12 months..  ibuprofen, omeprazole,     05- 05-                             valACYclovir.............    CBC.........  12 months..  ibuprofen, valACYclovir..  05- 05-    Cr..........  12 months..  ibuprofen, valACYclovir..  05- 05-    Health [facility] Embedded Care Due Messages. Reference number: [id number].   7/31/2025 12:52:37 AM CDT

## 2025-08-19 ENCOUNTER — PATIENT MESSAGE (OUTPATIENT)
Dept: ADMINISTRATIVE | Facility: HOSPITAL | Age: 60
End: 2025-08-19
Payer: COMMERCIAL